# Patient Record
Sex: MALE | Race: WHITE
[De-identification: names, ages, dates, MRNs, and addresses within clinical notes are randomized per-mention and may not be internally consistent; named-entity substitution may affect disease eponyms.]

---

## 2017-03-17 ENCOUNTER — HOSPITAL ENCOUNTER (EMERGENCY)
Dept: HOSPITAL 56 - MW.ED | Age: 57
Discharge: HOME | End: 2017-03-17
Payer: MEDICARE

## 2017-03-17 DIAGNOSIS — J18.1: Primary | ICD-10-CM

## 2017-03-17 DIAGNOSIS — Z88.6: ICD-10-CM

## 2017-03-17 DIAGNOSIS — Z88.0: ICD-10-CM

## 2017-03-17 DIAGNOSIS — Z79.899: ICD-10-CM

## 2017-03-17 DIAGNOSIS — I10: ICD-10-CM

## 2017-03-17 DIAGNOSIS — E11.9: ICD-10-CM

## 2017-03-17 LAB
CHLORIDE SERPL-SCNC: 102 MMOL/L (ref 98–110)
SODIUM SERPL-SCNC: 138 MMOL/L (ref 136–146)

## 2017-03-17 PROCEDURE — 83880 ASSAY OF NATRIURETIC PEPTIDE: CPT

## 2017-03-17 PROCEDURE — 80053 COMPREHEN METABOLIC PANEL: CPT

## 2017-03-17 PROCEDURE — 83605 ASSAY OF LACTIC ACID: CPT

## 2017-03-17 PROCEDURE — 93005 ELECTROCARDIOGRAM TRACING: CPT

## 2017-03-17 PROCEDURE — 96365 THER/PROPH/DIAG IV INF INIT: CPT

## 2017-03-17 PROCEDURE — 71020: CPT

## 2017-03-17 PROCEDURE — 36415 COLL VENOUS BLD VENIPUNCTURE: CPT

## 2017-03-17 PROCEDURE — 87040 BLOOD CULTURE FOR BACTERIA: CPT

## 2017-03-17 PROCEDURE — 94664 DEMO&/EVAL PT USE INHALER: CPT

## 2017-03-17 PROCEDURE — 99284 EMERGENCY DEPT VISIT MOD MDM: CPT

## 2017-03-17 PROCEDURE — 85025 COMPLETE CBC W/AUTO DIFF WBC: CPT

## 2017-03-17 PROCEDURE — 96361 HYDRATE IV INFUSION ADD-ON: CPT

## 2017-03-17 NOTE — EDM.PDOC
ED HISTORY OF PRESENT ILLNESS





- General


Chief Complaint: Respiratory Problem


Stated Complaint: COUGH


Time Seen by Provider: 03/17/17 19:24





- History of Present Illness


INITIAL COMMENTS - FREE TEXT/NARRATIVE: 





HISTORY AND PHYSICAL:





History of present illness:


The patient is a 55 y/o male  with a history of diabetes using both oral and 

insulin and presents with a five-day history of dry hacking cough nonproductive 

of any phlegm. Patient follows at [facility] but did not see his provider 

for these issues but he did get his influenza shot this year. The patient 

denies any cardiac or pulmonary history and has been eating and drinking 

normally. According to the patient and wife at bedside he coughs more at night 

and sometimes will cough so hard that he can throw up. He does not throw up 

when he is not coughing and has no abdominal complaints. The patient denies any 

chest pain and only feels short of breath when he is coughing. Patient denies 

any leg pain or swelling and has no CHF history. Patient states compliance with 

his medications. He has not used any over-the-counter medications. He is noted 

to have a fever here in triage but denies that he feels hot. His palpitations.





Review of systems: 


As per history of present illness and below otherwise all systems reviewed and 

negative.





Past medical history: 


As per history of present illness and as reviewed below otherwise 

noncontributory.





Surgical history: 


As per history of present illness and as reviewed below otherwise 

noncontributory.





Social history: 


No reported history of drug or alcohol abuse.





Family history: 


As per history of present illness and as reviewed below otherwise 

noncontributory.





Physical exam:


General: Well-developed well-nourished man speaking clearly and easily without 

breathlessness and vital signs of the note by me.


HEENT: Atraumatic, normocephalic, pupils reactive, negative for conjunctival 

pallor or scleral icterus, mucous membranes moist, throat clear, neck supple, 

nontender, trachea midline. No cervical adenopathy or nuchal rigidity


Lungs: Clear to auscultation, breath sounds equal bilaterally, chest nontender. 

No work or breathing or sensory muscle use no wheezing stridor


Heart: S1S2, regular rhythm and slightly tachycardic rate of my evaluation, 

negative for clicks, rubs, or JVD.


Abdomen: Soft, nondistended, nontender. Negative for masses or 

hepatosplenomegaly. Negative for costovertebral tenderness.


Pelvis: Stable nontender.


Genitourinary: Deferred.


Rectal: Deferred.


Extremities: Atraumatic, negative for cords or calf pain. Neurovascular 

unremarkable. No pedal edema or leg asymmetry


Neuro: Awake, alert, oriented. Cranial nerves II through XII unremarkable. 

Cerebellum unremarkable. Motor and sensory unremarkable throughout. Exam 

nonfocal.





Diagnostics:


EKG chest x-ray CBC CMP lactic acid influenza swab BNP blood culture x2





Therapeutics:


Gentle IV fluids duo neb Rocephin





I discussed with the patient and wife at bedside the testing results and care 

plan to drop to blood cultures and give Rocephin in the ER and antibiotics for 

home. I will also give him a spacer and albuterol as he feels that that did 

help open up his airways and help with the cough. I will advise hydration and 

close followup with family DrDaniel at [facility], . I have advised on 

reasons to return 





Impression: 


Persistent cough, early right lower lobe pneumonia





Definitive disposition and diagnosis as appropriate pending reevaluation and 

review of above.





- Related Data


Allergies/ADRs: 


 Allergies











Allergy/AdvReac Type Severity Reaction Status Date / Time


 


acetaminophen [From Tylenol] Allergy  Facial Verified 03/17/17 19:17





   Swelling  


 


Penicillins Allergy  Facial Verified 03/17/17 19:17





   Swelling  











Home Meds: 


 Home Meds





Hydrochlorothiazide 25 mg PO DAILY 03/17/17 [History]


Insulin Aspart [Novolog] 60 unit SQ BID 03/17/17 [History]


metFORMIN [Glucophage] 500 mg PO QID 03/17/17 [History]











Past Medical History


HEENT History: Reports: None


Cardiovascular History: Reports: Hypertension


Respiratory History: Reports: None


Gastrointestinal History: Reports: None


Genitourinary History: Reports: None


Psychiatric History: Reports: None


Endocrine/Metabolic History: Reports: Diabetes, type II


Oncologic (Cancer) History: Reports: None





- Infectious Disease History


Infectious Disease History: Reports: Chicken pox





Social & Family History





- Family History


Family Medical History: Noncontributory





- Tobacco Use


Smoking Status *Q: Never Smoker





- Recreational Drug Use


Recreational Drug Use: No





ED ROS GENERAL





- Review of Systems


Review Of Systems: ROS reveals no pertinent complaints other than HPI.





ED EXAM, GENERAL





- Physical Exam


Exam: See Below (See dictation)





Course





- Vital Signs


Last Recorded V/S: 


 Last Vital Signs











Temp  38.2 C H  03/17/17 19:20


 


Pulse  114 H  03/17/17 19:20


 


Resp  18   03/17/17 19:20


 


BP  152/91 H  03/17/17 19:20


 


Pulse Ox  95   03/17/17 19:20














- Orders/Labs/Meds


Orders: 


 Active Orders 24 hr











 Category Date Time Status


 


 EKG Documentation Completion [RC] STAT Care  03/17/17 19:28 Active


 


 RT Aerosol Therapy [RC] ASDIRECTED Care  03/17/17 19:33 Active


 


 Chest 2V [CR] Stat Exams  03/17/17 19:29 Taken


 


 CULTURE BLOOD [BC] Stat Lab  03/17/17 20:45 Ordered


 


 CULTURE BLOOD [BC] Stat Lab  03/17/17 20:45 Ordered


 


 Sodium Chloride 0.9% [Normal Saline] 500 ml Med  03/17/17 19:30 Active





 IV STAT   


 


 Sodium Chloride 0.9% [Saline Flush] Med  03/17/17 19:30 Active





 10 ml FLUSH ASDIRECTED PRN   


 


 Sodium Chloride 0.9% [Saline Flush] Med  03/17/17 19:30 Active





 2.5 ml FLUSH ASDIRECTED PRN   


 


 cefTRIAXone [Rocephin in Dextrose,Iso-Osm 1 GM/50 ML] 1 Med  03/17/17 20:45 

Ordered





 gm   





 Premix Bag 1 bag   





 IV ONETIME   


 


 Blood Culture x2 Reflex Set [OM.PC] Stat Oth  03/17/17 20:45 Ordered


 


 Saline Lock Insert [OM.PC] Stat Oth  03/17/17 19:29 Ordered








 Medication Orders





Sodium Chloride (Normal Saline)  500 mls @ 999 mls/hr IV STAT [facility]


   Last Admin: 03/17/17 20:01  Dose: 999 mls/hr


Ceftriaxone Sodium/Dextrose 1 (gm/ Premix)  50 mls @ 100 mls/hr IV ONETIME ONE


   Stop: 03/17/17 21:14


Sodium Chloride (Saline Flush)  10 ml FLUSH ASDIRECTED PRN


   PRN Reason: Keep Vein Open


Sodium Chloride (Saline Flush)  2.5 ml FLUSH ASDIRECTED PRN


   PRN Reason: Keep Vein Open








Labs: 


 Laboratory Tests











  03/17/17 03/17/17 03/17/17 Range/Units





  19:45 19:45 19:45 


 


WBC  5.60    (4.0-11.0)  K/uL


 


RBC  4.58    (4.50-5.90)  M/uL


 


Hgb  13.6    (13.0-17.0)  g/dL


 


Hct  42.1    (38.0-50.0)  %


 


MCV  91.9    (80.0-98.0)  fL


 


MCH  29.7    (27.0-32.0)  pg


 


MCHC  32.3    (31.0-37.0)  g/dL


 


RDW Std Deviation  45.9    (28.0-62.0)  fl


 


RDW Coeff of Arvind  14    (11.0-15.0)  %


 


Plt Count  148 L    (150-400)  K/uL


 


MPV  11.40    (7.40-12.00)  fL


 


Add Manual Diff  YES    


 


Neutrophils % (Manual)  55    (48.0-80.0)  %


 


Band Neutrophils %  3    %


 


Lymphocytes % (Manual)  22    (16.0-40.0)  %


 


Monocytes % (Manual)  16 H    (0.0-15.0)  %


 


Eosinophils % (Manual)  4    (0.0-7.0)  %


 


Nucleated RBC %  0.0    /100WBC


 


Absolute Seg Neuts  3.1    


 


Band Neutrophils #  0.2    


 


Lymphocytes # (Manual)  1.2    


 


Monocytes # (Manual)  0.9    


 


Eosinophils # (Manual)  0.2    


 


Nucleated RBCs #  0    K/uL


 


Lactate   2.1 H   (0.20-2.00)  mmol/L


 


Sodium    138  (136-146)  mmol/L


 


Potassium    4.7  (3.5-5.1)  mmol/L


 


Chloride    102  ()  mmol/L


 


Carbon Dioxide    25  (21-31)  mmol/L


 


BUN    12  (6.0-23.0)  mg/dL


 


Creatinine    0.9  (0.6-1.5)  mg/dL


 


Est Cr Clr Drug Dosing    100.59  mL/min


 


Estimated GFR (MDRD)    > 60.0  ml/min


 


Glucose    157 H  ()  mg/dL


 


Calcium    9.3  (8.8-10.8)  mg/dL


 


Total Bilirubin    0.7  (0.1-1.5)  mg/dL


 


AST    32  (5-40)  IU/L


 


ALT    36  (8-54)  IU/L


 


Alkaline Phosphatase    56  ()  


 


B-Natriuretic Peptide     (<100)  PG/ML


 


Total Protein    8.3 H  (6.0-8.0)  g/dL


 


Albumin    4.0  (3.5-5.0)  g/dL


 


Globulin    4.3 H  (2.0-3.5)  g/dL


 


Albumin/Globulin Ratio    0.9 L  (1.3-2.8)  














  03/17/17 Range/Units





  19:45 


 


WBC   (4.0-11.0)  K/uL


 


RBC   (4.50-5.90)  M/uL


 


Hgb   (13.0-17.0)  g/dL


 


Hct   (38.0-50.0)  %


 


MCV   (80.0-98.0)  fL


 


MCH   (27.0-32.0)  pg


 


MCHC   (31.0-37.0)  g/dL


 


RDW Std Deviation   (28.0-62.0)  fl


 


RDW Coeff of Arvind   (11.0-15.0)  %


 


Plt Count   (150-400)  K/uL


 


MPV   (7.40-12.00)  fL


 


Add Manual Diff   


 


Neutrophils % (Manual)   (48.0-80.0)  %


 


Band Neutrophils %   %


 


Lymphocytes % (Manual)   (16.0-40.0)  %


 


Monocytes % (Manual)   (0.0-15.0)  %


 


Eosinophils % (Manual)   (0.0-7.0)  %


 


Nucleated RBC %   /100WBC


 


Absolute Seg Neuts   


 


Band Neutrophils #   


 


Lymphocytes # (Manual)   


 


Monocytes # (Manual)   


 


Eosinophils # (Manual)   


 


Nucleated RBCs #   K/uL


 


Lactate   (0.20-2.00)  mmol/L


 


Sodium   (136-146)  mmol/L


 


Potassium   (3.5-5.1)  mmol/L


 


Chloride   ()  mmol/L


 


Carbon Dioxide   (21-31)  mmol/L


 


BUN   (6.0-23.0)  mg/dL


 


Creatinine   (0.6-1.5)  mg/dL


 


Est Cr Clr Drug Dosing   mL/min


 


Estimated GFR (MDRD)   ml/min


 


Glucose   ()  mg/dL


 


Calcium   (8.8-10.8)  mg/dL


 


Total Bilirubin   (0.1-1.5)  mg/dL


 


AST   (5-40)  IU/L


 


ALT   (8-54)  IU/L


 


Alkaline Phosphatase   ()  


 


B-Natriuretic Peptide  < 15  (<100)  PG/ML


 


Total Protein   (6.0-8.0)  g/dL


 


Albumin   (3.5-5.0)  g/dL


 


Globulin   (2.0-3.5)  g/dL


 


Albumin/Globulin Ratio   (1.3-2.8)  











Meds: 


Medications











Generic Name Dose Route Start Last Admin





  Trade Name Freq  PRN Reason Stop Dose Admin


 


Sodium Chloride  500 mls @ 999 mls/hr  03/17/17 19:30  03/17/17 20:01





  Normal Saline  IV   999 mls/hr





  STAT ZHAO   Administration


 


Ceftriaxone Sodium/Dextrose 1  50 mls @ 100 mls/hr  03/17/17 20:45  





  gm/ Premix  IV  03/17/17 21:14  





  ONETIME ONE   


 


Sodium Chloride  10 ml  03/17/17 19:30  





  Saline Flush  FLUSH   





  ASDIRECTED PRN   





  Keep Vein Open   


 


Sodium Chloride  2.5 ml  03/17/17 19:30  





  Saline Flush  FLUSH   





  ASDIRECTED PRN   





  Keep Vein Open   














Discontinued Medications














Generic Name Dose Route Start Last Admin





  Trade Name Freq  PRN Reason Stop Dose Admin


 


Albuterol/Ipratropium  3 ml  03/17/17 19:33  03/17/17 19:41





  Duoneb 3.0-0.5 Mg/3 Ml  NEB  03/17/17 19:34  3 ml





  ONETIME ONE   Administration


 


Ibuprofen  800 mg  03/17/17 19:30  03/17/17 20:00





  Motrin  PO  03/17/17 19:31  800 mg





  ONETIME ONE   Administration














Departure





- Departure


Time of Disposition: 20:48


Disposition: Home, Self-Care 01


Condition: good


Clinical Impression: 


Pneumonia


Qualifiers:


 Pneumonia type: due to unspecified organism Laterality: right Lung location: 

lower lobe of lung Qualified Code(s): J18.1 - Lobar pneumonia, unspecified 

organism





Forms:  ED Department Discharge


Additional Instructions: 


The following information is given to patients seen in the emergency department 

who are being discharged to home. This information is to outline your options 

for follow-up care. We provide all patients seen in our emergency department 

with a follow-up referral.





The need for follow-up, as well as the timing and circumstances, are variable 

depending upon the specifics of your emergency department visit.





If you don't have a primary care physician on staff, we will provide you with a 

referral. We always advise you to contact your personal physician following an 

emergency department visit to inform them of the circumstance of the visit and 

for follow-up with them and/or the need for any referrals to a consulting 

specialist.





The emergency department will also refer you to a specialist when appropriate. 

This referral assures that you have the opportunity for followup care with a 

specialist. All of these measure are taken in an effort to provide you with 

optimal care, which includes your followup.





Under all circumstances we always encourage you to contact your private 

physician who remains a resource for coordinating  your care. When calling for 

followup care, please make the office aware that this follow-up is from your 

recent emergency room visit. If for any reason you are refused follow-up, 

please contact the [facility] emergency 

department at [phone] and ask to speak to the emergency department 

charge nurse.





[facility] 


Primary care- Internal Medicine and Family [facility]


[address] [place] [id number]


[phone]





[address]


[place] [address] [phone]








Please call and schedule a followup appointment early next week with  

or one of our providers at the clinic. Push hydration rest and use over-the-

counter Tylenol or ibuprofen for fevers. Take antibiotics until they are 

finished. Use the inhaler you have been prescribed today with a spacer as 

needed every 6 hours for cough or trouble with your breathing. Return to ER as 

needed and as discussed 





- My Orders


Last 24 Hours: 


My Active Orders





03/17/17 19:28


EKG Documentation Completion [RC] STAT 





03/17/17 19:29


Chest 2V [CR] Stat 


Saline Lock Insert [OM.PC] Stat 





03/17/17 19:30


Sodium Chloride 0.9% [Normal Saline] 500 ml IV STAT 


Sodium Chloride 0.9% [Saline Flush]   10 ml FLUSH ASDIRECTED PRN 


Sodium Chloride 0.9% [Saline Flush]   2.5 ml FLUSH ASDIRECTED PRN 





03/17/17 19:33


RT Aerosol Therapy [RC] ASDIRECTED 





03/17/17 20:45


CULTURE BLOOD [BC] Stat 


CULTURE BLOOD [BC] Stat 


cefTRIAXone [Rocephin in Dextrose,Iso-Osm 1 GM/50 ML] 1 gm   Premix Bag 1 bag 

IV ONETIME 


Blood Culture x2 Reflex Set [OM.PC] Stat 














- Assessment/Plan


Last 24 Hours: 


My Active Orders





03/17/17 19:28


EKG Documentation Completion [RC] STAT 





03/17/17 19:29


Chest 2V [CR] Stat 


Saline Lock Insert [OM.PC] Stat 





03/17/17 19:30


Sodium Chloride 0.9% [Normal Saline] 500 ml IV STAT 


Sodium Chloride 0.9% [Saline Flush]   10 ml FLUSH ASDIRECTED PRN 


Sodium Chloride 0.9% [Saline Flush]   2.5 ml FLUSH ASDIRECTED PRN 





03/17/17 19:33


RT Aerosol Therapy [RC] ASDIRECTED 





03/17/17 20:45


CULTURE BLOOD [BC] Stat 


CULTURE BLOOD [BC] Stat 


cefTRIAXone [Rocephin in Dextrose,Iso-Osm 1 GM/50 ML] 1 gm   Premix Bag 1 bag 

IV ONETIME 


Blood Culture x2 Reflex Set [OM.PC] Stat

## 2017-03-18 VITALS — DIASTOLIC BLOOD PRESSURE: 81 MMHG | SYSTOLIC BLOOD PRESSURE: 140 MMHG

## 2017-03-20 NOTE — CR
EXAM DATE: 17



PATIENT'S AGE: 56



Patient: DARRELL CASH



Facility: [place], [place]

Patient ID: [id number]

Site Patient ID: [id number].

Site Accession #: [id number]

: 1960

Study: XRay Chest [id number]/17/2017 8:20:38 PM

Ordering Physician: Dorothea Urena



Final Report: 

INDICATION:

Cough for 5 days. 



COMPARISON:

2014.



FINDINGS/IMPRESSION:

Question of subtle new minimal infiltrate at the right lung base, possibly 
representing pneumonia. Lungs otherwise appear clear.



Upper normal heart size, stable. Unremarkable pulmonary vasculature. No pleural 
effusions.



Spinal degenerative changes, healed left rib fractures, and healed left 
clavicle fracture.



Dictated by Giovani Mcdaniel MD @ 3/17/2017 8:37:13 PM



Dictated by: Giovani Mcdaniel MD @ 2017 20:38:39

(Electronic Signature)



Report Signed by Proxy and Original Signed Document filed in the

Medical Record.
[id number]

## 2019-05-19 ENCOUNTER — HOSPITAL ENCOUNTER (INPATIENT)
Dept: HOSPITAL 56 - MW.ED | Age: 59
LOS: 4 days | Discharge: HOME HEALTH | DRG: 641 | End: 2019-05-23
Attending: INTERNAL MEDICINE | Admitting: INTERNAL MEDICINE
Payer: MEDICARE

## 2019-05-19 DIAGNOSIS — F41.9: ICD-10-CM

## 2019-05-19 DIAGNOSIS — E66.9: ICD-10-CM

## 2019-05-19 DIAGNOSIS — Z88.8: ICD-10-CM

## 2019-05-19 DIAGNOSIS — Z79.4: ICD-10-CM

## 2019-05-19 DIAGNOSIS — M62.82: ICD-10-CM

## 2019-05-19 DIAGNOSIS — R78.81: ICD-10-CM

## 2019-05-19 DIAGNOSIS — E11.65: ICD-10-CM

## 2019-05-19 DIAGNOSIS — I10: ICD-10-CM

## 2019-05-19 DIAGNOSIS — D64.9: ICD-10-CM

## 2019-05-19 DIAGNOSIS — E86.0: Primary | ICD-10-CM

## 2019-05-19 LAB
CHLORIDE SERPL-SCNC: 101 MMOL/L (ref 98–107)
SODIUM SERPL-SCNC: 140 MMOL/L (ref 136–148)

## 2019-05-19 PROCEDURE — G0378 HOSPITAL OBSERVATION PER HR: HCPCS

## 2019-05-20 LAB
CHLORIDE SERPL-SCNC: 103 MMOL/L (ref 98–107)
HBA1C BLD-MCNC: 7.7 % (ref 4.5–6.2)
SODIUM SERPL-SCNC: 140 MMOL/L (ref 136–148)

## 2019-05-20 RX ADMIN — METFORMIN HYDROCHLORIDE SCH MG: 500 TABLET, EXTENDED RELEASE ORAL at 10:06

## 2019-05-20 RX ADMIN — METFORMIN HYDROCHLORIDE SCH MG: 500 TABLET, EXTENDED RELEASE ORAL at 20:28

## 2019-05-21 LAB
CHLORIDE SERPL-SCNC: 101 MMOL/L (ref 98–107)
SODIUM SERPL-SCNC: 138 MMOL/L (ref 136–148)

## 2019-05-21 RX ADMIN — METFORMIN HYDROCHLORIDE SCH MG: 500 TABLET, EXTENDED RELEASE ORAL at 20:35

## 2019-05-21 RX ADMIN — METFORMIN HYDROCHLORIDE SCH MG: 500 TABLET, EXTENDED RELEASE ORAL at 09:50

## 2019-05-22 RX ADMIN — METFORMIN HYDROCHLORIDE SCH MG: 500 TABLET, EXTENDED RELEASE ORAL at 20:52

## 2019-05-22 RX ADMIN — METFORMIN HYDROCHLORIDE SCH MG: 500 TABLET, EXTENDED RELEASE ORAL at 08:56

## 2019-05-23 LAB
CHLORIDE SERPL-SCNC: 104 MMOL/L (ref 98–107)
SODIUM SERPL-SCNC: 139 MMOL/L (ref 136–148)

## 2019-05-23 RX ADMIN — METFORMIN HYDROCHLORIDE SCH MG: 500 TABLET, EXTENDED RELEASE ORAL at 08:17

## 2019-06-15 ENCOUNTER — HOSPITAL ENCOUNTER (INPATIENT)
Dept: HOSPITAL 56 - MW.ED | Age: 59
LOS: 13 days | Discharge: HOME HEALTH | DRG: 690 | End: 2019-06-28
Attending: INTERNAL MEDICINE | Admitting: INTERNAL MEDICINE
Payer: MEDICARE

## 2019-06-15 DIAGNOSIS — R41.82: ICD-10-CM

## 2019-06-15 DIAGNOSIS — E11.9: ICD-10-CM

## 2019-06-15 DIAGNOSIS — N30.00: Primary | ICD-10-CM

## 2019-06-15 DIAGNOSIS — I10: ICD-10-CM

## 2019-06-15 DIAGNOSIS — S60.512A: ICD-10-CM

## 2019-06-15 DIAGNOSIS — R41.89: ICD-10-CM

## 2019-06-15 DIAGNOSIS — Z88.8: ICD-10-CM

## 2019-06-15 DIAGNOSIS — F70: ICD-10-CM

## 2019-06-15 DIAGNOSIS — F88: ICD-10-CM
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
## 2019-06-15 DIAGNOSIS — Z88.0: ICD-10-CM

## 2019-06-15 DIAGNOSIS — Z79.899: ICD-10-CM

## 2019-06-15 DIAGNOSIS — Z79.4: ICD-10-CM

## 2019-06-15 DIAGNOSIS — S00.81XA: ICD-10-CM

## 2019-06-15 DIAGNOSIS — R46.0: ICD-10-CM

## 2019-06-15 DIAGNOSIS — Z91.19: ICD-10-CM

## 2019-06-15 LAB
CHLORIDE SERPL-SCNC: 100 MMOL/L (ref 98–107)
SODIUM SERPL-SCNC: 135 MMOL/L (ref 136–148)

## 2019-06-15 PROCEDURE — A4217 STERILE WATER/SALINE, 500 ML: HCPCS

## 2019-06-15 RX ADMIN — DEXTROSE SCH UNITS: 10 SOLUTION INTRAVENOUS at 22:04

## 2019-06-15 RX ADMIN — DEXTROSE SCH UNITS: 10 SOLUTION INTRAVENOUS at 15:10

## 2019-06-15 NOTE — PCM.HP
H&P History of Present Illness





- General


Date of Service: 06/15/19


Admit Problem/Dx: 


 Admission Diagnosis/Problem





Admission Diagnosis/Problem      Altered mental status








Source of Information: Patient, Family, Old Records


History Limitations: Reports: Altered Mental Status





- History of Present Illness


Initial Comments - Free Text/Narative: 





The patient is a 58-year-old gentleman who had been presented to the emergency 

department by his family out of concern for his altered mental status, bizarre 

behavior and not taking any of his medications. The patient was previously 

admitted in May 2019. The patient does have a history of trying to escape at 

times from his previous hospitalization. The patient's family says that they 

cannot take care of him anymore at home. Further, the patient's family says 

that he has been warding urine in jars under his bed as well as not being able 

to take care of his medications to adequately take care of himself. The patient 

himself is alert and mostly oriented although he is somewhat vague in his 

answers and does not know how he got here. A review of records and indicate the 

patient had been driving erratically and had been picked up by the police 

department. The patient has admitted to his family that he would like to go to 

[facility].


Onset of Symptoms: Reports: Unknown/Unsure


Duration of Symptoms: Reports: Week(s):


Location: Reports: Generalized


Severity: Mild


Improves with: Reports: None


Worsens with: Reports: None


Associated Symptoms: Reports: No Other Symptoms





- Related Data


Allergies/Adverse Reactions: 


 Allergies











Allergy/AdvReac Type Severity Reaction Status Date / Time


 


acetaminophen [From Tylenol] Allergy  Facial Verified 06/15/19 12:03





   Swelling  


 


Penicillins Allergy  Facial Verified 06/15/19 12:03





   Swelling  











Home Medications: 


 Home Meds





Hydrochlorothiazide 25 mg PO DAILY 03/17/17 [History]


Insulin Aspart [Novolog] 60 unit SQ BID 03/17/17 [History]


metFORMIN [Glucophage] 500 mg PO QID 03/17/17 [History]











Past Medical History


HEENT History: Reports: None


Cardiovascular History: Reports: Hypertension


Respiratory History: Reports: None


Gastrointestinal History: Reports: None


Genitourinary History: Reports: None


Psychiatric History: Reports: None


Endocrine/Metabolic History: Reports: Diabetes, Type II


Oncologic (Cancer) History: Reports: None





- Infectious Disease History


Infectious Disease History: Reports: Chicken Pox





Social & Family History





- Family History


Family Medical History: Noncontributory





- Tobacco Use


Smoking Status *Q: Unknown Ever Smoked





- Living Situation & Occupation


Living situation: Reports: Single, with Family


Occupation: Disabled





H&P Review of Systems





- Review of Systems:


Review Of Systems: Unable To Obtain





Exam





- Exam


Exam: See Below





- Vital Signs


Vital Signs: 


 Last Vital Signs











Temp  36.4 C   06/15/19 12:04


 


Pulse  117 H  06/15/19 12:04


 


Resp  18   06/15/19 12:04


 


BP  188/117 H  06/15/19 12:04


 


Pulse Ox  98   06/15/19 12:04











Weight: 108.862 kg





- Exam


Quality Assessment: No: Supplemental Oxygen


General: Alert, Cooperative.  No: Oriented


HEENT: Conjunctiva Clear, EACs Clear, EOMI, Mucosa Moist & [place], PERRLA


Neck: Supple, Trachea Midline


Lungs: Clear to Auscultation, Normal Respiratory Effort


Cardiovascular: Regular Rate, Regular Rhythm


GI/Abdominal Exam: Normal Bowel Sounds, Soft, No Distention


Back Exam: Normal Inspection, Full Range of Motion


Extremities: Normal Inspection, No Pedal Edema


Skin: Warm, Dry, Intact


Neurological: Cranial Nerves Intact, Normal Gait


Neuro Extensive - Mental Status: Alert, Disorientation to Place, Slow Response 

to Commands.  No: Normal Cognition


Neuro Extensive - Motor, Sensory, Reflexes: Normal Gait


Psychiatric: Alert, Depressed, Agitated





- Patient Data


Lab Results Last 24 hrs: 


 Laboratory Results - last 24 hr











  06/15/19 06/15/19 06/15/19 Range/Units





  12:05 12:05 13:20 


 


WBC  10.01    (4.0-11.0)  K/uL


 


RBC  4.45 L    (4.50-5.90)  M/uL


 


Hgb  13.6    (13.0-17.0)  g/dL


 


Hct  40.7    (38.0-50.0)  %


 


MCV  91.5    (80.0-98.0)  fL


 


MCH  30.6    (27.0-32.0)  pg


 


MCHC  33.4    (31.0-37.0)  g/dL


 


RDW Std Deviation  45.1    (28.0-62.0)  fl


 


RDW Coeff of Arvind  14    (11.0-15.0)  %


 


Plt Count  194    (150-400)  K/uL


 


MPV  10.50    (7.40-12.00)  fL


 


Neut % (Auto)  77.9    (48.0-80.0)  %


 


Lymph % (Auto)  14.0 L    (16.0-40.0)  %


 


Mono % (Auto)  6.8    (0.0-15.0)  %


 


Eos % (Auto)  1.0    (0.0-7.0)  %


 


Baso % (Auto)  0.3    (0.0-1.5)  %


 


Neut # (Auto)  7.8 H    (1.4-5.7)  K/uL


 


Lymph # (Auto)  1.4    (0.6-2.4)  K/uL


 


Mono # (Auto)  0.7    (0.0-0.8)  K/uL


 


Eos # (Auto)  0.1    (0.0-0.7)  K/uL


 


Baso # (Auto)  0.0    (0.0-0.1)  K/uL


 


Nucleated RBC %  0.0    /100WBC


 


Nucleated RBCs #  0    K/uL


 


Sodium   135 L   (136-148)  mmol/L


 


Potassium   3.4 L   (3.5-5.1)  mmol/L


 


Chloride   100   ()  mmol/L


 


Carbon Dioxide   25.9   (21.0-32.0)  mmol/L


 


BUN   16   (7.0-18.0)  mg/dL


 


Creatinine   0.9   (0.8-1.3)  mg/dL


 


Est Cr Clr Drug Dosing   83.65   mL/min


 


Estimated GFR (MDRD)   > 60.0   ml/min


 


Glucose   239 H   ()  mg/dL


 


Calcium   8.9   (8.5-10.1)  mg/dL


 


Total Bilirubin   0.7   (0.2-1.0)  mg/dL


 


AST   33   (15-37)  IU/L


 


ALT   53   (14-63)  IU/L


 


Alkaline Phosphatase   63   ()  U/L


 


Total Protein   7.7   (6.4-8.2)  g/dL


 


Albumin   3.6   (3.4-5.0)  g/dL


 


Globulin   4.1 H   (2.6-4.0)  g/dL


 


Albumin/Globulin Ratio   0.9   (0.9-1.6)  


 


Urine Color    YELLOW  


 


Urine Appearance    HAZY  


 


Urine pH    6.0  (5.0-8.0)  


 


Ur Specific Gravity    1.015  (1.001-1.035)  


 


Urine Protein    NEGATIVE  (NEGATIVE)  mg/dL


 


Urine Glucose (UA)    NEGATIVE  (NEGATIVE)  mg/dL


 


Urine Ketones    NEGATIVE  (NEGATIVE)  mg/dL


 


Urine Occult Blood    MODERATE H  (NEGATIVE)  


 


Urine Nitrite    POSITIVE H  (NEGATIVE)  


 


Urine Bilirubin    NEGATIVE  (NEGATIVE)  


 


Urine Urobilinogen    0.2  (<2.0)  EU/dL


 


Ur Leukocyte Esterase    SMALL H  (NEGATIVE)  


 


Urine RBC    6-8  (0-2/HPF)  


 


Urine WBC    10-15  (0-5/HPF)  


 


Ur Epithelial Cells    RARE  (NONE-FEW)  


 


Urine Bacteria    2+ H  (NEGATIVE)  











Result Diagrams: 


 06/15/19 12:05





 06/15/19 12:05





- Problem List


(1) Altered mental status


SNOMED Code(s): 660686332


   ICD Code: R41.82 - ALTERED MENTAL STATUS, UNSPECIFIED   Status: Acute   

Priority: High   Current Visit: Yes   


Qualifiers: 


   Altered mental status type: unspecified   Qualified Code(s): R41.82 - 

Altered mental status, unspecified   





(2) Delayed emotional development


SNOMED Code(s): 395933641


   ICD Code: F88 - OTHER DISORDERS OF PSYCHOLOGICAL DEVELOPMENT   Status: 

Chronic   Priority: High   Current Visit: Yes   





(3) Diabetes mellitus type 2 in obese


SNOMED Code(s): 60312691


   ICD Code: E11.69 - TYPE 2 DIABETES MELLITUS WITH OTHER SPECIFIED COMPLICATION

; E66.9 - OBESITY, UNSPECIFIED   Status: Chronic   Priority: High   Current 

Visit: Yes   





(4) Noncompliance


SNOMED Code(s): 8639792


   ICD Code: Z91.19 - PATIENT'S NONCOMPLIANCE W OTH MEDICAL TREATMENT AND 

REGIMEN   Status: Chronic   Priority: Medium   Current Visit: Yes   





(5) Total self-care deficit


SNOMED Code(s): 63494780


   ICD Code: R41.89 - OTH SYMPTOMS AND SIGNS W COGNITIVE FUNCTIONS AND 

AWARENESS   Status: Chronic   Priority: High   Current Visit: Yes   


Problem List Initiated/Reviewed/Updated: Yes


Orders Last 24hrs: 


 Active Orders 24 hr











 Category Date Time Status


 


 Patient Status [ADT] Stat ADT  06/15/19 13:32 Active


 


 Diabetes Education [RC] Click to Edit Care  06/15/19 13:57 Active


 


 EKG Documentation Completion [RC] STAT Care  06/15/19 11:58 Active


 


 Oxygen Therapy [RC] PRN Care  06/15/19 13:56 Active


 


 Up With Assistance [RC] ASDIRECTED Care  06/15/19 13:56 Active


 


 VTE/DVT Education [RC] PER UNIT ROUTINE Care  06/15/19 13:56 Active


 


 Vital Signs [RC] Q4H Care  06/15/19 13:56 Active


 


 American Diabetic Association Diet [DIET] Diet  06/15/19 Dinner Active


 


 Cervical Spine wo Cont [CT] Stat Exams  06/15/19 11:59 Taken


 


 Head wo Cont [CT] Stat Exams  06/15/19 11:58 Taken


 


 CULTURE URINE [RM] Routine Lab  06/15/19 13:25 Received


 


 Docusate Sodium [Colace] Med  06/15/19 13:56 Active





 100 mg PO BID PRN   


 


 Heparin Sodium Med  06/15/19 14:00 Active





 5,000 units SUBCUT Q8H   


 


 Ibuprofen [Motrin] Med  06/15/19 13:56 Active





 400 mg PO Q6H PRN   


 


 Insulin Aspart [NovoLOG] Med  06/15/19 21:00 Active





 See Protocol  SUBCUT ACBREAKFASTANDBED   


 


 Levofloxacin/Dextrose 5%-Water [Levaquin in D5W 500 MG/ Med  06/15/19 13:33 

Active





 100 ML] 500 mg   





 Premix Bag 1 bag   





 IV ONETIME   


 


 Ondansetron [Zofran ODT] Med  06/15/19 13:56 Active





 4 mg PO Q6H PRN   


 


 Sodium Chloride 0.9% [Normal Saline] 1,000 ml Med  06/15/19 14:00 Active





 IV ASDIRECTED   


 


 Sodium Chloride 0.9% [Saline Flush] Med  06/15/19 11:59 Active





 10 ml FLUSH ASDIRECTED PRN   


 


 Sodium Chloride 0.9% [Saline Flush] Med  06/15/19 11:59 Active





 2.5 ml FLUSH ASDIRECTED PRN   


 


 Temazepam [Restoril] Med  06/15/19 13:56 Active





 15 mg PO BEDTIME PRN   


 


 oxyCODONE Med  06/15/19 13:56 Active





 5 mg PO Q4H PRN   


 


 Glucose Management Sub Q Reflex [OM.PC] Click To Edit Oth  06/15/19 13:56 

Ordered


 


 Saline Lock Insert [OM.PC] Stat Oth  06/15/19 11:58 Ordered


 


 Resuscitation Status Routine Resus Stat  06/15/19 13:56 Ordered








 Medication Orders





Docusate Sodium (Colace)  100 mg PO BID PRN


   PRN Reason: Constipation


Heparin Sodium (Porcine) (Heparin Sodium)  5,000 units SUBCUT Q8H ZHAO


Levofloxacin/Dextrose 500 mg/ (Premix)  100 mls @ 100 mls/hr IV ONETIME ONE


   Stop: 06/15/19 14:32


   Last Admin: 06/15/19 13:47  Dose: 100 mls/hr


Sodium Chloride (Normal Saline)  1,000 mls @ 75 mls/hr IV ASDIRECTED ZHAO


Ibuprofen (Motrin)  400 mg PO Q6H PRN


   PRN Reason: Pain (mild 1-3)


Insulin Aspart (Novolog)  0 unit SUBCUT ACBREAKFASTANDBED ZHAO; Protocol


Ondansetron HCl (Zofran Odt)  4 mg PO Q6H PRN


   PRN Reason: nausea, able to take PO


Oxycodone HCl (Oxycodone)  5 mg PO Q4H PRN


   PRN Reason: Pain (moderate 4-6)


Sodium Chloride (Saline Flush)  10 ml FLUSH ASDIRECTED PRN


   PRN Reason: Keep Vein Open


   Last Admin: 06/15/19 12:12  Dose: 10 ml


Sodium Chloride (Saline Flush)  2.5 ml FLUSH ASDIRECTED PRN


   PRN Reason: Keep Vein Open


   Last Admin: 06/15/19 12:12  Dose: 2.5 ml


Temazepam (Restoril)  15 mg PO BEDTIME PRN


   PRN Reason: Sleep








Assessment/Plan Comment:: 





the patient is a 58-year-old gentleman who has some developmental delay and has 

been lately, over the past 6 months, exhibiting rather bizarre symptoms. The 

patient has not been able to take care of himself according to the family and 

has been noncompliant with his medications especially with regards to his 

diabetes. The patient will be admitted to inpatient due to the intensity of 

service and the fact that the patient may require one-to-one setter secondary 

to his history of previously trying to escape from hospitalization. The patient 

apparently hasn't been mean or angry he has just been wanting to escape. The 

patient will be kept on fluids. Is no evidence of infection although this be 

monitored by repeat laboratory studies. I've ordered Haldol 5 mg by mouth as 

necessary for his agitation. The patient will also be kept on appropriate ADA 

diet. I've also ordered insulin sliding scale area the patient has been 

noncompliant with his antihypertensive medications and he'll be monitored with 

regards to his vital signs and his medication will be adjusted as necessary. 

The patient should be appropriate for discharge to [facility] after 

formal evaluation.

## 2019-06-15 NOTE — CT
HISTORY:



Pain.



TECHNIQUE:



Noncontrast CT of the cervical spine.



COMPARISON:



No prior.



FINDINGS:



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 There 

is no acute cervical fracture. No cervical malalignment. Degenerative disc 

and joint disease present within the cervical spine.



-



At C2-C3, no canal or foraminal stenosis.



At C3-C4, no canal or foraminal stenosis.



At C4-C5, no canal or foraminal stenosis.



At C5-C6, no canal or foraminal stenosis.



At C6-C7, no canal or foraminal stenosis.



At C7-T1, no canal or foraminal stenosis.



IMPRESSION:



1. No acute cervical fracture or cervical malalignment.



2. Degenerative changes.



3. 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



Dictated by Kayode Myles MD @ 6/15/2019 1:14:12 PM



Please note that all CT scans at this facility use dose modulation, 

iterative reconstruction, and/or weight-based dosing when appropriate to 

reduce radiation dose to as low as reasonably achievable.



Dictated by: Kayode Myles MD @ 06/15/2019 13:14:15



(Electronically Signed)

## 2019-06-15 NOTE — CT
HISTORY:



Pain.



TECHNIQUE:



Noncontrast head CT.



COMPARISON:



No prior.



FINDINGS:



There is no acute intracranial hemorrhage or acute ischemic infarct. No 

mass effect or midline shift. No hydrocephalus. No extra-axial collection 

or hematoma. No acute loss of gray-white differentiation. The mastoid air 

cells are clear. Paranasal sinuses are clear. No acute skull fracture.



IMPRESSION:



No acute intracranial injury or disease.



Dictated by Kayode Myles MD @ 6/15/2019 1:10:24 PM



Please note that all CT scans at this facility use dose modulation, 

iterative reconstruction, and/or weight-based dosing when appropriate to 

reduce radiation dose to as low as reasonably achievable.



Dictated by: Kayode Myles MD @ 06/15/2019 13:10:30



(Electronically Signed)

## 2019-06-15 NOTE — EDM.PDOC
ED HPI GENERAL MEDICAL PROBLEM





- General


Chief Complaint: Neurological Problem


Stated Complaint: AMB


Time Seen by Provider: 06/15/19 11:58


Source of Information: Reports: EMS


History Limitations: Reports: No Limitations





- History of Present Illness


INITIAL COMMENTS - FREE TEXT/NARRATIVE: 


History of present illness:


[]Patient was driving erratically and police was notified and followed him 

home. He is confused unable to give any history. Patient has been admitted here 

for similar confusion in the past. Patient arrived sitting upright on the 

[place] disheveled, soiled his pants and abrasion on his face and left hand. He 

is able to say his name and denies any pain. Patient's family requested a 

dementia workup. She had a similar episode in May this year and was admitted to 

this hospital and eloped undressed but was stopped in the parking lot by EMS as 

they were coming in.





Review of systems: 


As per history of present illness and below otherwise all systems reviewed and 

negative.





Past medical history: 


As per history of present illness and as reviewed below otherwise 

noncontributory.





Surgical history: 


As per history of present illness and as reviewed below otherwise 

noncontributory.





Social history: 


No reported history of drug or alcohol abuse.





Family history: 


As per history of present illness and as reviewed below otherwise 

noncontributory.





Physical exam:


General: Well developed, well nourished in NAD


HEENT: Abrasion right cheek,, normocephalic, pupils reactive, negative for 

conjunctival pallor or scleral icterus, mucous membranes moist, throat clear, 

neck supple, nontender, trachea midline.


Lungs: Clear to auscultation, breath sounds equal bilaterally, chest nontender.


Heart: S1S2, regular, negative for clicks, rubs, or JVD.


Abdomen: NABS, Soft, nondistended, nontender. Negative for masses or 

hepatosplenomegaly. Negative for costovertebral tenderness.


Pelvis: Stable nontender.


Genitourinary: Deferred.


Rectal: Deferred.


Extremities:Abrasion left dorsal hand, negative for cords or calf pain. 

Neurovascular unremarkable.


Neuro: Awake, alert, oriented 2 Cranial nerves II through XII unremarkable. 

Cerebellum unremarkable. Motor and sensory unremarkable throughout. Exam 

nonfocal.


Skin:warm and dry





Diagnostics:


CT head, C-spine, CBC, chemistry, UA, EKG





Therapeutics:


IV hydration





ED Course:


Stable





Impression: 


Altered mental status, abrasion on face and left hand





Prescriptions:


None





Plan:


Admit to Dr. Asher





Definitive disposition and diagnosis as appropriate pending reevaluation and 

review of above.








- Related Data


 Allergies











Allergy/AdvReac Type Severity Reaction Status Date / Time


 


acetaminophen [From Tylenol] Allergy  Facial Verified 06/15/19 12:03





   Swelling  


 


Penicillins Allergy  Facial Verified 06/15/19 12:03





   Swelling  











Home Meds: 


 Home Meds





Hydrochlorothiazide 25 mg PO DAILY 03/17/17 [History]


Insulin Aspart [Novolog] 60 unit SQ BID 03/17/17 [History]


metFORMIN [Glucophage] 500 mg PO QID 03/17/17 [History]











Past Medical History


HEENT History: Reports: None


Cardiovascular History: Reports: Hypertension


Respiratory History: Reports: None


Gastrointestinal History: Reports: None


Genitourinary History: Reports: None


Psychiatric History: Reports: None


Endocrine/Metabolic History: Reports: Diabetes, Type II


Oncologic (Cancer) History: Reports: None





- Infectious Disease History


Infectious Disease History: Reports: Chicken Pox





Social & Family History





- Family History


Family Medical History: Noncontributory





ED ROS GENERAL





- Review of Systems


Review Of Systems: ROS reveals no pertinent complaints other than HPI.





ED EXAM, GENERAL





- Physical Exam


Exam: See Below (History of present illness)





Course





- Vital Signs


Last Recorded V/S: 


 Last Vital Signs











Temp  97.6 F   06/15/19 12:04


 


Pulse  117 H  06/15/19 12:04


 


Resp  18   06/15/19 12:04


 


BP  188/117 H  06/15/19 12:04


 


Pulse Ox  98   06/15/19 12:04














- Orders/Labs/Meds


Orders: 


 Active Orders 24 hr











 Category Date Time Status


 


 Patient Status [ADT] Stat ADT  06/15/19 13:32 Active


 


 EKG Documentation Completion [RC] STAT Care  06/15/19 11:58 Active


 


 Cervical Spine wo Cont [CT] Stat Exams  06/15/19 11:59 Taken


 


 Head wo Cont [CT] Stat Exams  06/15/19 11:58 Taken


 


 CULTURE URINE [RM] Routine Lab  06/15/19 13:25 Received


 


 Levofloxacin/Dextrose 5%-Water [Levaquin in D5W 500 MG/ Med  06/15/19 13:33 

Active





 100 ML] 500 mg   





 Premix Bag 1 bag   





 IV ONETIME   


 


 Sodium Chloride 0.9% [Saline Flush] Med  06/15/19 11:59 Active





 10 ml FLUSH ASDIRECTED PRN   


 


 Sodium Chloride 0.9% [Saline Flush] Med  06/15/19 11:59 Active





 2.5 ml FLUSH ASDIRECTED PRN   


 


 Saline Lock Insert [OM.PC] Stat Oth  06/15/19 11:58 Ordered








 Medication Orders





Docusate Sodium (Colace)  100 mg PO BID PRN


   PRN Reason: Constipation


Heparin Sodium (Porcine) (Heparin Sodium)  5,000 units SUBCUT Q8H ZHAO


Levofloxacin/Dextrose 500 mg/ (Premix)  100 mls @ 100 mls/hr IV ONETIME ONE


   Stop: 06/15/19 14:32


   Last Admin: 06/15/19 13:47  Dose: 100 mls/hr


Sodium Chloride (Normal Saline)  1,000 mls @ 75 mls/hr IV ASDIRECTED ZHAO


Ibuprofen (Motrin)  400 mg PO Q6H PRN


   PRN Reason: Pain (mild 1-3)


Insulin Aspart (Novolog)  0 unit SUBCUT ACBREAKFASTANDBED ZHAO; Protocol


Ondansetron HCl (Zofran Odt)  4 mg PO Q6H PRN


   PRN Reason: nausea, able to take PO


Oxycodone HCl (Oxycodone)  5 mg PO Q4H PRN


   PRN Reason: Pain (moderate 4-6)


Sodium Chloride (Saline Flush)  10 ml FLUSH ASDIRECTED PRN


   PRN Reason: Keep Vein Open


   Last Admin: 06/15/19 12:12  Dose: 10 ml


Sodium Chloride (Saline Flush)  2.5 ml FLUSH ASDIRECTED PRN


   PRN Reason: Keep Vein Open


   Last Admin: 06/15/19 12:12  Dose: 2.5 ml


Temazepam (Restoril)  15 mg PO BEDTIME PRN


   PRN Reason: Sleep








Labs: 


 Laboratory Tests











  06/15/19 06/15/19 06/15/19 Range/Units





  12:05 12:05 13:20 


 


WBC  10.01    (4.0-11.0)  K/uL


 


RBC  4.45 L    (4.50-5.90)  M/uL


 


Hgb  13.6    (13.0-17.0)  g/dL


 


Hct  40.7    (38.0-50.0)  %


 


MCV  91.5    (80.0-98.0)  fL


 


MCH  30.6    (27.0-32.0)  pg


 


MCHC  33.4    (31.0-37.0)  g/dL


 


RDW Std Deviation  45.1    (28.0-62.0)  fl


 


RDW Coeff of Arvind  14    (11.0-15.0)  %


 


Plt Count  194    (150-400)  K/uL


 


MPV  10.50    (7.40-12.00)  fL


 


Neut % (Auto)  77.9    (48.0-80.0)  %


 


Lymph % (Auto)  14.0 L    (16.0-40.0)  %


 


Mono % (Auto)  6.8    (0.0-15.0)  %


 


Eos % (Auto)  1.0    (0.0-7.0)  %


 


Baso % (Auto)  0.3    (0.0-1.5)  %


 


Neut # (Auto)  7.8 H    (1.4-5.7)  K/uL


 


Lymph # (Auto)  1.4    (0.6-2.4)  K/uL


 


Mono # (Auto)  0.7    (0.0-0.8)  K/uL


 


Eos # (Auto)  0.1    (0.0-0.7)  K/uL


 


Baso # (Auto)  0.0    (0.0-0.1)  K/uL


 


Nucleated RBC %  0.0    /100WBC


 


Nucleated RBCs #  0    K/uL


 


Sodium   135 L   (136-148)  mmol/L


 


Potassium   3.4 L   (3.5-5.1)  mmol/L


 


Chloride   100   ()  mmol/L


 


Carbon Dioxide   25.9   (21.0-32.0)  mmol/L


 


BUN   16   (7.0-18.0)  mg/dL


 


Creatinine   0.9   (0.8-1.3)  mg/dL


 


Est Cr Clr Drug Dosing   83.65   mL/min


 


Estimated GFR (MDRD)   > 60.0   ml/min


 


Glucose   239 H   ()  mg/dL


 


Calcium   8.9   (8.5-10.1)  mg/dL


 


Total Bilirubin   0.7   (0.2-1.0)  mg/dL


 


AST   33   (15-37)  IU/L


 


ALT   53   (14-63)  IU/L


 


Alkaline Phosphatase   63   ()  U/L


 


Total Protein   7.7   (6.4-8.2)  g/dL


 


Albumin   3.6   (3.4-5.0)  g/dL


 


Globulin   4.1 H   (2.6-4.0)  g/dL


 


Albumin/Globulin Ratio   0.9   (0.9-1.6)  


 


Urine Color    YELLOW  


 


Urine Appearance    HAZY  


 


Urine pH    6.0  (5.0-8.0)  


 


Ur Specific Gravity    1.015  (1.001-1.035)  


 


Urine Protein    NEGATIVE  (NEGATIVE)  mg/dL


 


Urine Glucose (UA)    NEGATIVE  (NEGATIVE)  mg/dL


 


Urine Ketones    NEGATIVE  (NEGATIVE)  mg/dL


 


Urine Occult Blood    MODERATE H  (NEGATIVE)  


 


Urine Nitrite    POSITIVE H  (NEGATIVE)  


 


Urine Bilirubin    NEGATIVE  (NEGATIVE)  


 


Urine Urobilinogen    0.2  (<2.0)  EU/dL


 


Ur Leukocyte Esterase    SMALL H  (NEGATIVE)  


 


Urine RBC    6-8  (0-2/HPF)  


 


Urine WBC    10-15  (0-5/HPF)  


 


Ur Epithelial Cells    RARE  (NONE-FEW)  


 


Urine Bacteria    2+ H  (NEGATIVE)  











Meds: 


Medications











Generic Name Dose Route Start Last Admin





  Trade Name Freq  PRN Reason Stop Dose Admin


 


Docusate Sodium  100 mg  06/15/19 13:56  





  Colace  PO   





  BID PRN   





  Constipation   





     





     





     


 


Heparin Sodium (Porcine)  5,000 units  06/15/19 14:00  





  Heparin Sodium  SUBCUT   





  Q8H [facility]   





     





     





     





     


 


Levofloxacin/Dextrose 500 mg/  100 mls @ 100 mls/hr  06/15/19 13:33  06/15/19 13

:47





  Premix  IV  06/15/19 14:32  100 mls/hr





  ONETIME ONE   Administration





     





     





     





     


 


Sodium Chloride  1,000 mls @ 75 mls/hr  06/15/19 14:00  





  Normal Saline  IV   





  ASDIRECTED [facility]   





     





     





     





     


 


Ibuprofen  400 mg  06/15/19 13:56  





  Motrin  PO   





  Q6H PRN   





  Pain (mild 1-3)   





     





     





     


 


Insulin Aspart  0 unit  06/15/19 21:00  





  Novolog  SUBCUT   





  ACBREAKFASTANDBED [facility]   





     





     





  Protocol   





     


 


Ondansetron HCl  4 mg  06/15/19 13:56  





  Zofran Odt  PO   





  Q6H PRN   





  nausea, able to take PO   





     





     





     


 


Oxycodone HCl  5 mg  06/15/19 13:56  





  Oxycodone  PO   





  Q4H PRN   





  Pain (moderate 4-6)   





     





     





     


 


Sodium Chloride  10 ml  06/15/19 11:59  06/15/19 12:12





  Saline Flush  FLUSH   10 ml





  ASDIRECTED PRN   Administration





  Keep Vein Open   





     





     





     


 


Sodium Chloride  2.5 ml  06/15/19 11:59  06/15/19 12:12





  Saline Flush  FLUSH   2.5 ml





  ASDIRECTED PRN   Administration





  Keep Vein Open   





     





     





     


 


Temazepam  15 mg  06/15/19 13:56  





  Restoril  PO   





  BEDTIME PRN   





  Sleep   





     





     





     














Discontinued Medications














Generic Name Dose Route Start Last Admin





  Trade Name Freq  PRN Reason Stop Dose Admin


 


Sodium Chloride  1,000 mls @ 999 mls/hr  06/15/19 11:59  06/15/19 12:11





  Normal Saline  IV  06/15/19 12:59  999 mls/hr





  .Bolus ONE   Administration





     





     





     





     














Departure





- Departure


Time of Disposition: 14:22


Disposition: Refer to Observation


Condition: Good


Clinical Impression: 


Altered mental status


Qualifiers:


 Altered mental status type: unspecified Qualified Code(s): R41.82 - Altered 

mental status, unspecified








- Discharge Information


*PRESCRIPTION DRUG MONITORING PROGRAM REVIEWED*: No


*COPY OF PRESCRIPTION DRUG MONITORING REPORT IN PATIENT JOSE MIGUEL: No





- My Orders


Last 24 Hours: 


My Active Orders





06/15/19 11:58


EKG Documentation Completion [RC] STAT 


Head wo Cont [CT] Stat 


Saline Lock Insert [OM.PC] Stat 





06/15/19 11:59


Cervical Spine wo Cont [CT] Stat 


Sodium Chloride 0.9% [Saline Flush]   10 ml FLUSH ASDIRECTED PRN 


Sodium Chloride 0.9% [Saline Flush]   2.5 ml FLUSH ASDIRECTED PRN 





06/15/19 13:25


CULTURE URINE [RM] Routine 





06/15/19 13:32


Patient Status [ADT] Stat 





06/15/19 13:33


Levofloxacin/Dextrose 5%-Water [Levaquin in D5W 500 MG/100 ML] 500 mg   Premix 

Bag 1 bag IV ONETIME 














- Assessment/Plan


Last 24 Hours: 


My Active Orders





06/15/19 11:58


EKG Documentation Completion [RC] STAT 


Head wo Cont [CT] Stat 


Saline Lock Insert [OM.PC] Stat 





06/15/19 11:59


Cervical Spine wo Cont [CT] Stat 


Sodium Chloride 0.9% [Saline Flush]   10 ml FLUSH ASDIRECTED PRN 


Sodium Chloride 0.9% [Saline Flush]   2.5 ml FLUSH ASDIRECTED PRN 





06/15/19 13:25


CULTURE URINE [RM] Routine 





06/15/19 13:32


Patient Status [ADT] Stat 





06/15/19 13:33


Levofloxacin/Dextrose 5%-Water [Levaquin in D5W 500 MG/100 ML] 500 mg   Premix 

Bag 1 bag IV ONETIME

## 2019-06-16 LAB
CHLORIDE SERPL-SCNC: 105 MMOL/L (ref 98–107)
SODIUM SERPL-SCNC: 138 MMOL/L (ref 136–148)

## 2019-06-16 RX ADMIN — CEFTRIAXONE SCH MLS/HR: 1 INJECTION, SOLUTION INTRAVENOUS at 09:09

## 2019-06-16 RX ADMIN — DEXTROSE SCH UNITS: 10 SOLUTION INTRAVENOUS at 14:45

## 2019-06-16 RX ADMIN — DEXTROSE SCH UNITS: 10 SOLUTION INTRAVENOUS at 05:17

## 2019-06-16 RX ADMIN — DEXTROSE SCH UNITS: 10 SOLUTION INTRAVENOUS at 21:36

## 2019-06-16 NOTE — PCM.PN
<Byron Jiang - Last Filed: 06/16/19 08:55>





- General Info


Date of Service: 06/16/19


Subjective Update: 





59 y/o with history of developmental delay admitted for AMS and found to have a 

UTI. Alert and oriented x3. Denies any pain. Received 1 dose of haldol 

overnight for agitation.





- Patient Data


Vitals - Most Recent: 


 Last Vital Signs











Temp  36.0 C   06/16/19 08:00


 


Pulse  67   06/16/19 08:00


 


Resp  16   06/16/19 08:00


 


BP  136/82   06/16/19 08:00


 


Pulse Ox  97   06/16/19 08:00











Weight - Most Recent: 108.862 kg


I&O - Last 24 Hours: 


 Intake & Output











 06/15/19 06/16/19 06/16/19





 22:59 06:59 14:59


 


Intake Total 300 1599 


 


Output Total 150  


 


Balance 150 1599 











Lab Results Last 24 Hours: 


 Laboratory Results - last 24 hr











  06/15/19 06/15/19 06/15/19 Range/Units





  12:05 12:05 13:20 


 


WBC  10.01    (4.0-11.0)  K/uL


 


RBC  4.45 L    (4.50-5.90)  M/uL


 


Hgb  13.6    (13.0-17.0)  g/dL


 


Hct  40.7    (38.0-50.0)  %


 


MCV  91.5    (80.0-98.0)  fL


 


MCH  30.6    (27.0-32.0)  pg


 


MCHC  33.4    (31.0-37.0)  g/dL


 


RDW Std Deviation  45.1    (28.0-62.0)  fl


 


RDW Coeff of Arvind  14    (11.0-15.0)  %


 


Plt Count  194    (150-400)  K/uL


 


MPV  10.50    (7.40-12.00)  fL


 


Neut % (Auto)  77.9    (48.0-80.0)  %


 


Lymph % (Auto)  14.0 L    (16.0-40.0)  %


 


Mono % (Auto)  6.8    (0.0-15.0)  %


 


Eos % (Auto)  1.0    (0.0-7.0)  %


 


Baso % (Auto)  0.3    (0.0-1.5)  %


 


Neut # (Auto)  7.8 H    (1.4-5.7)  K/uL


 


Lymph # (Auto)  1.4    (0.6-2.4)  K/uL


 


Mono # (Auto)  0.7    (0.0-0.8)  K/uL


 


Eos # (Auto)  0.1    (0.0-0.7)  K/uL


 


Baso # (Auto)  0.0    (0.0-0.1)  K/uL


 


Nucleated RBC %  0.0    /100WBC


 


Nucleated RBCs #  0    K/uL


 


Sodium   135 L   (136-148)  mmol/L


 


Potassium   3.4 L   (3.5-5.1)  mmol/L


 


Chloride   100   ()  mmol/L


 


Carbon Dioxide   25.9   (21.0-32.0)  mmol/L


 


BUN   16   (7.0-18.0)  mg/dL


 


Creatinine   0.9   (0.8-1.3)  mg/dL


 


Est Cr Clr Drug Dosing   83.65   mL/min


 


Estimated GFR (MDRD)   > 60.0   ml/min


 


Glucose   239 H   ()  mg/dL


 


POC Glucose     ()  mg/dL


 


Calcium   8.9   (8.5-10.1)  mg/dL


 


Total Bilirubin   0.7   (0.2-1.0)  mg/dL


 


AST   33   (15-37)  IU/L


 


ALT   53   (14-63)  IU/L


 


Alkaline Phosphatase   63   ()  U/L


 


Total Protein   7.7   (6.4-8.2)  g/dL


 


Albumin   3.6   (3.4-5.0)  g/dL


 


Globulin   4.1 H   (2.6-4.0)  g/dL


 


Albumin/Globulin Ratio   0.9   (0.9-1.6)  


 


Urine Color    YELLOW  


 


Urine Appearance    HAZY  


 


Urine pH    6.0  (5.0-8.0)  


 


Ur Specific Gravity    1.015  (1.001-1.035)  


 


Urine Protein    NEGATIVE  (NEGATIVE)  mg/dL


 


Urine Glucose (UA)    NEGATIVE  (NEGATIVE)  mg/dL


 


Urine Ketones    NEGATIVE  (NEGATIVE)  mg/dL


 


Urine Occult Blood    MODERATE H  (NEGATIVE)  


 


Urine Nitrite    POSITIVE H  (NEGATIVE)  


 


Urine Bilirubin    NEGATIVE  (NEGATIVE)  


 


Urine Urobilinogen    0.2  (<2.0)  EU/dL


 


Ur Leukocyte Esterase    SMALL H  (NEGATIVE)  


 


Urine RBC    6-8  (0-2/HPF)  


 


Urine WBC    10-15  (0-5/HPF)  


 


Ur Epithelial Cells    RARE  (NONE-FEW)  


 


Urine Bacteria    2+ H  (NEGATIVE)  














  06/15/19 06/15/19 06/16/19 Range/Units





  15:19 22:49 06:30 


 


WBC     (4.0-11.0)  K/uL


 


RBC     (4.50-5.90)  M/uL


 


Hgb     (13.0-17.0)  g/dL


 


Hct     (38.0-50.0)  %


 


MCV     (80.0-98.0)  fL


 


MCH     (27.0-32.0)  pg


 


MCHC     (31.0-37.0)  g/dL


 


RDW Std Deviation     (28.0-62.0)  fl


 


RDW Coeff of Arvind     (11.0-15.0)  %


 


Plt Count     (150-400)  K/uL


 


MPV     (7.40-12.00)  fL


 


Neut % (Auto)     (48.0-80.0)  %


 


Lymph % (Auto)     (16.0-40.0)  %


 


Mono % (Auto)     (0.0-15.0)  %


 


Eos % (Auto)     (0.0-7.0)  %


 


Baso % (Auto)     (0.0-1.5)  %


 


Neut # (Auto)     (1.4-5.7)  K/uL


 


Lymph # (Auto)     (0.6-2.4)  K/uL


 


Mono # (Auto)     (0.0-0.8)  K/uL


 


Eos # (Auto)     (0.0-0.7)  K/uL


 


Baso # (Auto)     (0.0-0.1)  K/uL


 


Nucleated RBC %     /100WBC


 


Nucleated RBCs #     K/uL


 


Sodium     (136-148)  mmol/L


 


Potassium     (3.5-5.1)  mmol/L


 


Chloride     ()  mmol/L


 


Carbon Dioxide     (21.0-32.0)  mmol/L


 


BUN     (7.0-18.0)  mg/dL


 


Creatinine     (0.8-1.3)  mg/dL


 


Est Cr Clr Drug Dosing     mL/min


 


Estimated GFR (MDRD)     ml/min


 


Glucose     ()  mg/dL


 


POC Glucose  302 H  290 H  185 H  ()  mg/dL


 


Calcium     (8.5-10.1)  mg/dL


 


Total Bilirubin     (0.2-1.0)  mg/dL


 


AST     (15-37)  IU/L


 


ALT     (14-63)  IU/L


 


Alkaline Phosphatase     ()  U/L


 


Total Protein     (6.4-8.2)  g/dL


 


Albumin     (3.4-5.0)  g/dL


 


Globulin     (2.6-4.0)  g/dL


 


Albumin/Globulin Ratio     (0.9-1.6)  


 


Urine Color     


 


Urine Appearance     


 


Urine pH     (5.0-8.0)  


 


Ur Specific Gravity     (1.001-1.035)  


 


Urine Protein     (NEGATIVE)  mg/dL


 


Urine Glucose (UA)     (NEGATIVE)  mg/dL


 


Urine Ketones     (NEGATIVE)  mg/dL


 


Urine Occult Blood     (NEGATIVE)  


 


Urine Nitrite     (NEGATIVE)  


 


Urine Bilirubin     (NEGATIVE)  


 


Urine Urobilinogen     (<2.0)  EU/dL


 


Ur Leukocyte Esterase     (NEGATIVE)  


 


Urine RBC     (0-2/HPF)  


 


Urine WBC     (0-5/HPF)  


 


Ur Epithelial Cells     (NONE-FEW)  


 


Urine Bacteria     (NEGATIVE)  














  06/16/19 Range/Units





  08:35 


 


WBC  5.23  (4.0-11.0)  K/uL


 


RBC  3.85 L  (4.50-5.90)  M/uL


 


Hgb  11.6 L  (13.0-17.0)  g/dL


 


Hct  36.3 L  (38.0-50.0)  %


 


MCV  94.3  (80.0-98.0)  fL


 


MCH  30.1  (27.0-32.0)  pg


 


MCHC  32.0  (31.0-37.0)  g/dL


 


RDW Std Deviation  47.8  (28.0-62.0)  fl


 


RDW Coeff of Arvind  14  (11.0-15.0)  %


 


Plt Count  163  (150-400)  K/uL


 


MPV  10.30  (7.40-12.00)  fL


 


Neut % (Auto)  64.7  (48.0-80.0)  %


 


Lymph % (Auto)  22.8  (16.0-40.0)  %


 


Mono % (Auto)  9.4  (0.0-15.0)  %


 


Eos % (Auto)  2.7  (0.0-7.0)  %


 


Baso % (Auto)  0.4  (0.0-1.5)  %


 


Neut # (Auto)  3.4  (1.4-5.7)  K/uL


 


Lymph # (Auto)  1.2  (0.6-2.4)  K/uL


 


Mono # (Auto)  0.5  (0.0-0.8)  K/uL


 


Eos # (Auto)  0.1  (0.0-0.7)  K/uL


 


Baso # (Auto)  0.0  (0.0-0.1)  K/uL


 


Nucleated RBC %  0.0  /100WBC


 


Nucleated RBCs #  0  K/uL


 


Sodium   (136-148)  mmol/L


 


Potassium   (3.5-5.1)  mmol/L


 


Chloride   ()  mmol/L


 


Carbon Dioxide   (21.0-32.0)  mmol/L


 


BUN   (7.0-18.0)  mg/dL


 


Creatinine   (0.8-1.3)  mg/dL


 


Est Cr Clr Drug Dosing   mL/min


 


Estimated GFR (MDRD)   ml/min


 


Glucose   ()  mg/dL


 


POC Glucose   ()  mg/dL


 


Calcium   (8.5-10.1)  mg/dL


 


Total Bilirubin   (0.2-1.0)  mg/dL


 


AST   (15-37)  IU/L


 


ALT   (14-63)  IU/L


 


Alkaline Phosphatase   ()  U/L


 


Total Protein   (6.4-8.2)  g/dL


 


Albumin   (3.4-5.0)  g/dL


 


Globulin   (2.6-4.0)  g/dL


 


Albumin/Globulin Ratio   (0.9-1.6)  


 


Urine Color   


 


Urine Appearance   


 


Urine pH   (5.0-8.0)  


 


Ur Specific Gravity   (1.001-1.035)  


 


Urine Protein   (NEGATIVE)  mg/dL


 


Urine Glucose (UA)   (NEGATIVE)  mg/dL


 


Urine Ketones   (NEGATIVE)  mg/dL


 


Urine Occult Blood   (NEGATIVE)  


 


Urine Nitrite   (NEGATIVE)  


 


Urine Bilirubin   (NEGATIVE)  


 


Urine Urobilinogen   (<2.0)  EU/dL


 


Ur Leukocyte Esterase   (NEGATIVE)  


 


Urine RBC   (0-2/HPF)  


 


Urine WBC   (0-5/HPF)  


 


Ur Epithelial Cells   (NONE-FEW)  


 


Urine Bacteria   (NEGATIVE)  











Med Orders - Current: 


 Current Medications





Docusate Sodium (Colace)  100 mg PO BID PRN


   PRN Reason: Constipation


Heparin Sodium (Porcine) (Heparin Sodium)  5,000 units SUBCUT Q8H [facility]


   Last Admin: 06/16/19 05:17 Dose:  5,000 units


Sodium Chloride (Normal Saline)  1,000 mls @ 75 mls/hr IV ASDIRECTED [facility]


   Last Admin: 06/16/19 04:08 Dose:  75 mls/hr


Ibuprofen (Motrin)  400 mg PO Q6H PRN


   PRN Reason: Pain (mild 1-3)


Insulin Aspart (Novolog)  0 unit SUBCUT TIDAC [facility]; Protocol


   Last Admin: 06/15/19 17:57 Dose:  Not Given


Ondansetron HCl (Zofran Odt)  4 mg PO Q6H PRN


   PRN Reason: nausea, able to take PO


Oxycodone HCl (Oxycodone)  5 mg PO Q4H PRN


   PRN Reason: Pain (moderate 4-6)


Sodium Chloride (Saline Flush)  10 ml FLUSH ASDIRECTED PRN


   PRN Reason: Keep Vein Open


   Last Admin: 06/15/19 12:12 Dose:  10 ml


Sodium Chloride (Saline Flush)  2.5 ml FLUSH ASDIRECTED PRN


   PRN Reason: Keep Vein Open


   Last Admin: 06/15/19 12:12 Dose:  2.5 ml


Temazepam (Restoril)  15 mg PO BEDTIME PRN


   PRN Reason: Sleep





Discontinued Medications





Haloperidol (Haldol)  5 mg PO ONETIME ONE


   Stop: 06/15/19 15:04


   Last Admin: 06/15/19 15:11 Dose:  5 mg


Sodium Chloride (Normal Saline)  1,000 mls @ 999 mls/hr IV .Bolus ONE


   Stop: 06/15/19 12:59


   Last Admin: 06/15/19 12:11 Dose:  999 mls/hr


Levofloxacin/Dextrose 500 mg/ (Premix)  100 mls @ 100 mls/hr IV ONETIME ONE


   Stop: 06/15/19 14:32


   Last Admin: 06/15/19 13:47 Dose:  100 mls/hr


Insulin Aspart (Novolog)  0 unit SUBCUT ACBREAKFASTANDBED [facility]; Protocol











- Exam


General: Alert, Oriented, Cooperative, No Acute Distress


Lungs: Clear to Auscultation, Normal Respiratory Effort, Wheezing.  No: Crackles


Cardiovascular: Regular Rate, Regular Rhythm


GI/Abdominal Exam: Normal Bowel Sounds, Soft, Non-Tender


Extremities: Normal Inspection, No Pedal Edema





- Problem List Review


Problem List Initiated/Reviewed/Updated: Yes





- Plan


Plan:: 











A:


1. UTI


2. self care deficit


3. PMH developmental delay





P:


1. UTI- pending urine culture. ordered ceftriaxone daily.


2. Self care deficit- pending placement





Dispo: pending placement














<Cordell Asher - Last Filed: 06/16/19 14:24>





- General Info


Admission Dx/Problem (Free Text): 





I have seen and examined to patient independently of medical resident, Byron Cabrera MD. I have discussed the case for care of this patient with him. I 

have reviewed and approve of the plan of care as outlined by medical resident. 

Please see orders. 








- Patient Data


Vitals - Most Recent: 


 Last Vital Signs











Temp  36.3 C   06/16/19 12:00


 


Pulse  75   06/16/19 12:00


 


Resp  18   06/16/19 12:00


 


BP  140/74   06/16/19 12:00


 


Pulse Ox  96   06/16/19 14:00











I&O - Last 24 Hours: 


 Intake & Output











 06/15/19 06/16/19 06/16/19





 22:59 06:59 14:59


 


Intake Total 300 1599 50


 


Output Total 150  


 


Balance 150 1599 50











Lab Results Last 24 Hours: 


 Laboratory Results - last 24 hr











  06/15/19 06/15/19 06/16/19 Range/Units





  15:19 22:49 06:30 


 


WBC     (4.0-11.0)  K/uL


 


RBC     (4.50-5.90)  M/uL


 


Hgb     (13.0-17.0)  g/dL


 


Hct     (38.0-50.0)  %


 


MCV     (80.0-98.0)  fL


 


MCH     (27.0-32.0)  pg


 


MCHC     (31.0-37.0)  g/dL


 


RDW Std Deviation     (28.0-62.0)  fl


 


RDW Coeff of Arvind     (11.0-15.0)  %


 


Plt Count     (150-400)  K/uL


 


MPV     (7.40-12.00)  fL


 


Neut % (Auto)     (48.0-80.0)  %


 


Lymph % (Auto)     (16.0-40.0)  %


 


Mono % (Auto)     (0.0-15.0)  %


 


Eos % (Auto)     (0.0-7.0)  %


 


Baso % (Auto)     (0.0-1.5)  %


 


Neut # (Auto)     (1.4-5.7)  K/uL


 


Lymph # (Auto)     (0.6-2.4)  K/uL


 


Mono # (Auto)     (0.0-0.8)  K/uL


 


Eos # (Auto)     (0.0-0.7)  K/uL


 


Baso # (Auto)     (0.0-0.1)  K/uL


 


Nucleated RBC %     /100WBC


 


Nucleated RBCs #     K/uL


 


Sodium     (136-148)  mmol/L


 


Potassium     (3.5-5.1)  mmol/L


 


Chloride     ()  mmol/L


 


Carbon Dioxide     (21.0-32.0)  mmol/L


 


BUN     (7.0-18.0)  mg/dL


 


Creatinine     (0.8-1.3)  mg/dL


 


Est Cr Clr Drug Dosing     mL/min


 


Estimated GFR (MDRD)     ml/min


 


Glucose     ()  mg/dL


 


POC Glucose  302 H  290 H  185 H  ()  mg/dL


 


Calcium     (8.5-10.1)  mg/dL


 


Total Bilirubin     (0.2-1.0)  mg/dL


 


AST     (15-37)  IU/L


 


ALT     (14-63)  IU/L


 


Alkaline Phosphatase     ()  U/L


 


Total Protein     (6.4-8.2)  g/dL


 


Albumin     (3.4-5.0)  g/dL


 


Globulin     (2.6-4.0)  g/dL


 


Albumin/Globulin Ratio     (0.9-1.6)  


 


Triglycerides     (0-200)  mg/dL


 


Cholesterol     ()  mg/dL


 


LDL Cholesterol, Calc     ()  mg/dL


 


VLDL Cholesterol     (5-55)  mg/dL


 


HDL Cholesterol     (40-60)  mg/dL


 


Cholesterol/HDL Ratio     (3.3-6.0)  














  06/16/19 06/16/19 06/16/19 Range/Units





  08:35 08:35 08:35 


 


WBC  5.23    (4.0-11.0)  K/uL


 


RBC  3.85 L    (4.50-5.90)  M/uL


 


Hgb  11.6 L    (13.0-17.0)  g/dL


 


Hct  36.3 L    (38.0-50.0)  %


 


MCV  94.3    (80.0-98.0)  fL


 


MCH  30.1    (27.0-32.0)  pg


 


MCHC  32.0    (31.0-37.0)  g/dL


 


RDW Std Deviation  47.8    (28.0-62.0)  fl


 


RDW Coeff of Arvind  14    (11.0-15.0)  %


 


Plt Count  163    (150-400)  K/uL


 


MPV  10.30    (7.40-12.00)  fL


 


Neut % (Auto)  64.7    (48.0-80.0)  %


 


Lymph % (Auto)  22.8    (16.0-40.0)  %


 


Mono % (Auto)  9.4    (0.0-15.0)  %


 


Eos % (Auto)  2.7    (0.0-7.0)  %


 


Baso % (Auto)  0.4    (0.0-1.5)  %


 


Neut # (Auto)  3.4    (1.4-5.7)  K/uL


 


Lymph # (Auto)  1.2    (0.6-2.4)  K/uL


 


Mono # (Auto)  0.5    (0.0-0.8)  K/uL


 


Eos # (Auto)  0.1    (0.0-0.7)  K/uL


 


Baso # (Auto)  0.0    (0.0-0.1)  K/uL


 


Nucleated RBC %  0.0    /100WBC


 


Nucleated RBCs #  0    K/uL


 


Sodium   138   (136-148)  mmol/L


 


Potassium   4.0   (3.5-5.1)  mmol/L


 


Chloride   105   ()  mmol/L


 


Carbon Dioxide   28.7   (21.0-32.0)  mmol/L


 


BUN   11   (7.0-18.0)  mg/dL


 


Creatinine   0.8   (0.8-1.3)  mg/dL


 


Est Cr Clr Drug Dosing   94.10   mL/min


 


Estimated GFR (MDRD)   > 60.0   ml/min


 


Glucose   198 H   ()  mg/dL


 


POC Glucose     ()  mg/dL


 


Calcium   8.1 L   (8.5-10.1)  mg/dL


 


Total Bilirubin   0.4   (0.2-1.0)  mg/dL


 


AST   22   (15-37)  IU/L


 


ALT   41   (14-63)  IU/L


 


Alkaline Phosphatase   51   ()  U/L


 


Total Protein   5.9 L   (6.4-8.2)  g/dL


 


Albumin   2.9 L   (3.4-5.0)  g/dL


 


Globulin   3.0   (2.6-4.0)  g/dL


 


Albumin/Globulin Ratio   1.0   (0.9-1.6)  


 


Triglycerides    136  (0-200)  mg/dL


 


Cholesterol    160  ()  mg/dL


 


LDL Cholesterol, Calc    85  ()  mg/dL


 


VLDL Cholesterol    27  (5-55)  mg/dL


 


HDL Cholesterol    48  (40-60)  mg/dL


 


Cholesterol/HDL Ratio    3.3  (3.3-6.0)  














  06/16/19 Range/Units





  12:42 


 


WBC   (4.0-11.0)  K/uL


 


RBC   (4.50-5.90)  M/uL


 


Hgb   (13.0-17.0)  g/dL


 


Hct   (38.0-50.0)  %


 


MCV   (80.0-98.0)  fL


 


MCH   (27.0-32.0)  pg


 


MCHC   (31.0-37.0)  g/dL


 


RDW Std Deviation   (28.0-62.0)  fl


 


RDW Coeff of Arvind   (11.0-15.0)  %


 


Plt Count   (150-400)  K/uL


 


MPV   (7.40-12.00)  fL


 


Neut % (Auto)   (48.0-80.0)  %


 


Lymph % (Auto)   (16.0-40.0)  %


 


Mono % (Auto)   (0.0-15.0)  %


 


Eos % (Auto)   (0.0-7.0)  %


 


Baso % (Auto)   (0.0-1.5)  %


 


Neut # (Auto)   (1.4-5.7)  K/uL


 


Lymph # (Auto)   (0.6-2.4)  K/uL


 


Mono # (Auto)   (0.0-0.8)  K/uL


 


Eos # (Auto)   (0.0-0.7)  K/uL


 


Baso # (Auto)   (0.0-0.1)  K/uL


 


Nucleated RBC %   /100WBC


 


Nucleated RBCs #   K/uL


 


Sodium   (136-148)  mmol/L


 


Potassium   (3.5-5.1)  mmol/L


 


Chloride   ()  mmol/L


 


Carbon Dioxide   (21.0-32.0)  mmol/L


 


BUN   (7.0-18.0)  mg/dL


 


Creatinine   (0.8-1.3)  mg/dL


 


Est Cr Clr Drug Dosing   mL/min


 


Estimated GFR (MDRD)   ml/min


 


Glucose   ()  mg/dL


 


POC Glucose  217 H  ()  mg/dL


 


Calcium   (8.5-10.1)  mg/dL


 


Total Bilirubin   (0.2-1.0)  mg/dL


 


AST   (15-37)  IU/L


 


ALT   (14-63)  IU/L


 


Alkaline Phosphatase   ()  U/L


 


Total Protein   (6.4-8.2)  g/dL


 


Albumin   (3.4-5.0)  g/dL


 


Globulin   (2.6-4.0)  g/dL


 


Albumin/Globulin Ratio   (0.9-1.6)  


 


Triglycerides   (0-200)  mg/dL


 


Cholesterol   ()  mg/dL


 


LDL Cholesterol, Calc   ()  mg/dL


 


VLDL Cholesterol   (5-55)  mg/dL


 


HDL Cholesterol   (40-60)  mg/dL


 


Cholesterol/HDL Ratio   (3.3-6.0)  











Med Orders - Current: 


 Current Medications





Docusate Sodium (Colace)  100 mg PO BID PRN


   PRN Reason: Constipation


Heparin Sodium (Porcine) (Heparin Sodium)  5,000 units SUBCUT Q8H [facility]


   Last Admin: 06/16/19 05:17 Dose:  5,000 units


Sodium Chloride (Normal Saline)  1,000 mls @ 75 mls/hr IV ASDIRECTED [facility]


   Last Admin: 06/16/19 04:08 Dose:  75 mls/hr


Ceftriaxone Sodium/Dextrose 1 (gm/ Premix)  50 mls @ 100 mls/hr IV Q24H [facility]


   Last Admin: 06/16/19 09:09 Dose:  100 mls/hr


Ibuprofen (Motrin)  400 mg PO Q6H PRN


   PRN Reason: Pain (mild 1-3)


Insulin Aspart (Novolog)  0 unit SUBCUT TIDAC [facility]; Protocol


   Last Admin: 06/16/19 09:31 Dose:  Not Given


Ondansetron HCl (Zofran Odt)  4 mg PO Q6H PRN


   PRN Reason: nausea, able to take PO


Oxycodone HCl (Oxycodone)  5 mg PO Q4H PRN


   PRN Reason: Pain (moderate 4-6)


Sodium Chloride (Saline Flush)  10 ml FLUSH ASDIRECTED PRN


   PRN Reason: Keep Vein Open


   Last Admin: 06/15/19 12:12 Dose:  10 ml


Sodium Chloride (Saline Flush)  2.5 ml FLUSH ASDIRECTED PRN


   PRN Reason: Keep Vein Open


   Last Admin: 06/15/19 12:12 Dose:  2.5 ml


Temazepam (Restoril)  15 mg PO BEDTIME PRN


   PRN Reason: Sleep





Discontinued Medications





Haloperidol (Haldol)  5 mg PO ONETIME ONE


   Stop: 06/15/19 15:04


   Last Admin: 06/15/19 15:11 Dose:  5 mg


Sodium Chloride (Normal Saline)  1,000 mls @ 999 mls/hr IV .Bolus ONE


   Stop: 06/15/19 12:59


   Last Admin: 06/15/19 12:11 Dose:  999 mls/hr


Levofloxacin/Dextrose 500 mg/ (Premix)  100 mls @ 100 mls/hr IV ONETIME ONE


   Stop: 06/15/19 14:32


   Last Admin: 06/15/19 13:47 Dose:  100 mls/hr


Insulin Aspart (Novolog)  0 unit SUBCUT ACBREAKFASTANDBED ZHAO; Protocol











- Problem List & Annotations


(1) Altered mental status


SNOMED Code(s): 312964703


   Code(s): R41.82 - ALTERED MENTAL STATUS, UNSPECIFIED   Status: Acute   

Priority: High   Current Visit: Yes   


Qualifiers: 


   Altered mental status type: unspecified   Qualified Code(s): R41.82 - 

Altered mental status, unspecified   





(2) Delayed emotional development


SNOMED Code(s): 247856955


   Code(s): F88 - OTHER DISORDERS OF PSYCHOLOGICAL DEVELOPMENT   Status: 

Chronic   Priority: High   Current Visit: Yes   





(3) Diabetes mellitus type 2 in obese


SNOMED Code(s): 26975594


   Code(s): E11.69 - TYPE 2 DIABETES MELLITUS WITH OTHER SPECIFIED COMPLICATION

; E66.9 - OBESITY, UNSPECIFIED   Status: Chronic   Priority: High   Current 

Visit: Yes   





(4) Noncompliance


SNOMED Code(s): 0067203


   Code(s): Z91.19 - PATIENT'S NONCOMPLIANCE W OTH MEDICAL TREATMENT AND 

REGIMEN   Status: Chronic   Priority: Medium   Current Visit: Yes   





(5) Total self-care deficit


SNOMED Code(s): 91320050


   Code(s): R41.89 - OTH SYMPTOMS AND SIGNS W COGNITIVE FUNCTIONS AND AWARENESS

   Status: Chronic   Priority: High   Current Visit: Yes   





- My Orders


Last 24 Hours: 


My Active Orders





06/15/19 13:56


Oxygen Therapy [RC] PRN 


Up With Assistance [RC] ASDIRECTED 


VTE/DVT Education [RC] PER UNIT ROUTINE 


Vital Signs [RC] Q4H 


Docusate Sodium [Colace]   100 mg PO BID PRN 


Ibuprofen [Motrin]   400 mg PO Q6H PRN 


Ondansetron [Zofran ODT]   4 mg PO Q6H PRN 


Temazepam [Restoril]   15 mg PO BEDTIME PRN 


oxyCODONE   5 mg PO Q4H PRN 


Glucose Management Sub Q Reflex [OM.PC] Click To Edit 


Resuscitation Status Routine 





06/15/19 13:57


Diabetes Education [RC] Click to Edit 





06/15/19 14:00


Heparin Sodium   5,000 units SUBCUT Q8H 


Sodium Chloride 0.9% [Normal Saline] 1,000 ml IV ASDIRECTED 





06/15/19 14:52


Admission Status [Patient Status] [ADT] Routine 





06/15/19 15:34


Blood Glucose Check, Bedside [RC] WITHMEALSANDBED 


Oxygen Therapy [RC] PRN 


VTE/DVT Education [RC] PER UNIT ROUTINE 


Vital Signs [RC] Q4H 





06/15/19 15:38


Diabetes Education [RC] Click to Edit 


Oxygen Therapy [RC] PRN 


VTE/DVT Education [RC] PER UNIT ROUTINE 


Vital Signs [RC] Q4H 


Glucose Management Sub Q Reflex [OM.PC] Click To Edit 





06/15/19 17:00


Insulin Aspart [NovoLOG]   See Protocol  SUBCUT TIDAC 





06/15/19 Dinner


[employer] Diet [DIET]

## 2019-06-17 LAB
CHLORIDE SERPL-SCNC: 104 MMOL/L (ref 98–107)
SODIUM SERPL-SCNC: 137 MMOL/L (ref 136–148)

## 2019-06-17 RX ADMIN — DEXTROSE SCH UNITS: 10 SOLUTION INTRAVENOUS at 13:57

## 2019-06-17 RX ADMIN — CEFTRIAXONE SCH MLS/HR: 1 INJECTION, SOLUTION INTRAVENOUS at 09:03

## 2019-06-17 RX ADMIN — DEXTROSE SCH UNITS: 10 SOLUTION INTRAVENOUS at 06:03

## 2019-06-17 RX ADMIN — DEXTROSE SCH UNITS: 10 SOLUTION INTRAVENOUS at 21:55

## 2019-06-17 NOTE — PCM.PN
<Kimberli Levy M - Last Filed: 06/17/19 08:54>





- General Info


Date of Service: 06/17/19


Admission Dx/Problem (Free Text): 





AMS, UTI


 





Subjective Update: 





Denies any concerns today. No pain. Eating and drinking good. No chest pain or 

SOB. 


Functional Status: Reports: Pain Controlled, Tolerating Diet, Ambulating, 

Urinating





- Review of Systems


General: Reports: No Symptoms.  Denies: Fever, Weakness, Fatigue


HEENT: Reports: No Symptoms.  Denies: Headaches, Sore Throat, Visual Changes


Pulmonary: Reports: No Symptoms.  Denies: Shortness of Breath, Cough, Sputum


Cardiovascular: Reports: No Symptoms


Gastrointestinal: Reports: No Symptoms.  Denies: Abdominal Pain, Nausea, 

Vomiting


Genitourinary: Reports: No Symptoms.  Denies: Dysuria, Frequency


Musculoskeletal: Reports: No Symptoms


Skin: Reports: No Symptoms


Neurological: Reports: No Symptoms


Psychiatric: Reports: No Symptoms





- Patient Data


Vitals - Most Recent: 


 Last Vital Signs











Temp  97.6 F   06/17/19 07:45


 


Pulse  78   06/17/19 07:45


 


Resp  16   06/17/19 07:45


 


BP  171/87 H  06/17/19 07:45


 


Pulse Ox  93 L  06/17/19 07:45











Weight - Most Recent: 108.862 kg


I&O - Last 24 Hours: 


 Intake & Output











 06/16/19 06/17/19 06/17/19





 22:59 06:59 14:59


 


Intake Total 2489 1959 


 


Output Total 0 200 


 


Balance 2489 1759 











Lab Results Last 24 Hours: 


 Laboratory Results - last 24 hr











  06/16/19 06/16/19 06/16/19 Range/Units





  08:35 08:35 08:35 


 


WBC  5.23    (4.0-11.0)  K/uL


 


RBC  3.85 L    (4.50-5.90)  M/uL


 


Hgb  11.6 L    (13.0-17.0)  g/dL


 


Hct  36.3 L    (38.0-50.0)  %


 


MCV  94.3    (80.0-98.0)  fL


 


MCH  30.1    (27.0-32.0)  pg


 


MCHC  32.0    (31.0-37.0)  g/dL


 


RDW Std Deviation  47.8    (28.0-62.0)  fl


 


RDW Coeff of Arvind  14    (11.0-15.0)  %


 


Plt Count  163    (150-400)  K/uL


 


MPV  10.30    (7.40-12.00)  fL


 


Neut % (Auto)  64.7    (48.0-80.0)  %


 


Lymph % (Auto)  22.8    (16.0-40.0)  %


 


Mono % (Auto)  9.4    (0.0-15.0)  %


 


Eos % (Auto)  2.7    (0.0-7.0)  %


 


Baso % (Auto)  0.4    (0.0-1.5)  %


 


Neut # (Auto)  3.4    (1.4-5.7)  K/uL


 


Lymph # (Auto)  1.2    (0.6-2.4)  K/uL


 


Mono # (Auto)  0.5    (0.0-0.8)  K/uL


 


Eos # (Auto)  0.1    (0.0-0.7)  K/uL


 


Baso # (Auto)  0.0    (0.0-0.1)  K/uL


 


Nucleated RBC %  0.0    /100WBC


 


Nucleated RBCs #  0    K/uL


 


Sodium   138   (136-148)  mmol/L


 


Potassium   4.0   (3.5-5.1)  mmol/L


 


Chloride   105   ()  mmol/L


 


Carbon Dioxide   28.7   (21.0-32.0)  mmol/L


 


BUN   11   (7.0-18.0)  mg/dL


 


Creatinine   0.8   (0.8-1.3)  mg/dL


 


Est Cr Clr Drug Dosing   94.10   mL/min


 


Estimated GFR (MDRD)   > 60.0   ml/min


 


Glucose   198 H   ()  mg/dL


 


POC Glucose     ()  mg/dL


 


Calcium   8.1 L   (8.5-10.1)  mg/dL


 


Total Bilirubin   0.4   (0.2-1.0)  mg/dL


 


AST   22   (15-37)  IU/L


 


ALT   41   (14-63)  IU/L


 


Alkaline Phosphatase   51   ()  U/L


 


Total Protein   5.9 L   (6.4-8.2)  g/dL


 


Albumin   2.9 L   (3.4-5.0)  g/dL


 


Globulin   3.0   (2.6-4.0)  g/dL


 


Albumin/Globulin Ratio   1.0   (0.9-1.6)  


 


Triglycerides    136  (0-200)  mg/dL


 


Cholesterol    160  ()  mg/dL


 


LDL Cholesterol, Calc    85  ()  mg/dL


 


VLDL Cholesterol    27  (5-55)  mg/dL


 


HDL Cholesterol    48  (40-60)  mg/dL


 


Cholesterol/HDL Ratio    3.3  (3.3-6.0)  














  06/16/19 06/16/19 06/17/19 Range/Units





  12:42 16:42 05:51 


 


WBC     (4.0-11.0)  K/uL


 


RBC     (4.50-5.90)  M/uL


 


Hgb     (13.0-17.0)  g/dL


 


Hct     (38.0-50.0)  %


 


MCV     (80.0-98.0)  fL


 


MCH     (27.0-32.0)  pg


 


MCHC     (31.0-37.0)  g/dL


 


RDW Std Deviation     (28.0-62.0)  fl


 


RDW Coeff of Arvind     (11.0-15.0)  %


 


Plt Count     (150-400)  K/uL


 


MPV     (7.40-12.00)  fL


 


Neut % (Auto)     (48.0-80.0)  %


 


Lymph % (Auto)     (16.0-40.0)  %


 


Mono % (Auto)     (0.0-15.0)  %


 


Eos % (Auto)     (0.0-7.0)  %


 


Baso % (Auto)     (0.0-1.5)  %


 


Neut # (Auto)     (1.4-5.7)  K/uL


 


Lymph # (Auto)     (0.6-2.4)  K/uL


 


Mono # (Auto)     (0.0-0.8)  K/uL


 


Eos # (Auto)     (0.0-0.7)  K/uL


 


Baso # (Auto)     (0.0-0.1)  K/uL


 


Nucleated RBC %     /100WBC


 


Nucleated RBCs #     K/uL


 


Sodium    137  (136-148)  mmol/L


 


Potassium    4.1  (3.5-5.1)  mmol/L


 


Chloride    104  ()  mmol/L


 


Carbon Dioxide    29.6  (21.0-32.0)  mmol/L


 


BUN    15  (7.0-18.0)  mg/dL


 


Creatinine    0.8  (0.8-1.3)  mg/dL


 


Est Cr Clr Drug Dosing    94.10  mL/min


 


Estimated GFR (MDRD)    > 60.0  ml/min


 


Glucose    183 H  ()  mg/dL


 


POC Glucose  217 H  256 H   ()  mg/dL


 


Calcium    8.6  (8.5-10.1)  mg/dL


 


Total Bilirubin     (0.2-1.0)  mg/dL


 


AST     (15-37)  IU/L


 


ALT     (14-63)  IU/L


 


Alkaline Phosphatase     ()  U/L


 


Total Protein     (6.4-8.2)  g/dL


 


Albumin     (3.4-5.0)  g/dL


 


Globulin     (2.6-4.0)  g/dL


 


Albumin/Globulin Ratio     (0.9-1.6)  


 


Triglycerides     (0-200)  mg/dL


 


Cholesterol     ()  mg/dL


 


LDL Cholesterol, Calc     ()  mg/dL


 


VLDL Cholesterol     (5-55)  mg/dL


 


HDL Cholesterol     (40-60)  mg/dL


 


Cholesterol/HDL Ratio     (3.3-6.0)  














  06/17/19 Range/Units





  06:05 


 


WBC   (4.0-11.0)  K/uL


 


RBC   (4.50-5.90)  M/uL


 


Hgb   (13.0-17.0)  g/dL


 


Hct   (38.0-50.0)  %


 


MCV   (80.0-98.0)  fL


 


MCH   (27.0-32.0)  pg


 


MCHC   (31.0-37.0)  g/dL


 


RDW Std Deviation   (28.0-62.0)  fl


 


RDW Coeff of Arvind   (11.0-15.0)  %


 


Plt Count   (150-400)  K/uL


 


MPV   (7.40-12.00)  fL


 


Neut % (Auto)   (48.0-80.0)  %


 


Lymph % (Auto)   (16.0-40.0)  %


 


Mono % (Auto)   (0.0-15.0)  %


 


Eos % (Auto)   (0.0-7.0)  %


 


Baso % (Auto)   (0.0-1.5)  %


 


Neut # (Auto)   (1.4-5.7)  K/uL


 


Lymph # (Auto)   (0.6-2.4)  K/uL


 


Mono # (Auto)   (0.0-0.8)  K/uL


 


Eos # (Auto)   (0.0-0.7)  K/uL


 


Baso # (Auto)   (0.0-0.1)  K/uL


 


Nucleated RBC %   /100WBC


 


Nucleated RBCs #   K/uL


 


Sodium   (136-148)  mmol/L


 


Potassium   (3.5-5.1)  mmol/L


 


Chloride   ()  mmol/L


 


Carbon Dioxide   (21.0-32.0)  mmol/L


 


BUN   (7.0-18.0)  mg/dL


 


Creatinine   (0.8-1.3)  mg/dL


 


Est Cr Clr Drug Dosing   mL/min


 


Estimated GFR (MDRD)   ml/min


 


Glucose   ()  mg/dL


 


POC Glucose  174 H  ()  mg/dL


 


Calcium   (8.5-10.1)  mg/dL


 


Total Bilirubin   (0.2-1.0)  mg/dL


 


AST   (15-37)  IU/L


 


ALT   (14-63)  IU/L


 


Alkaline Phosphatase   ()  U/L


 


Total Protein   (6.4-8.2)  g/dL


 


Albumin   (3.4-5.0)  g/dL


 


Globulin   (2.6-4.0)  g/dL


 


Albumin/Globulin Ratio   (0.9-1.6)  


 


Triglycerides   (0-200)  mg/dL


 


Cholesterol   ()  mg/dL


 


LDL Cholesterol, Calc   ()  mg/dL


 


VLDL Cholesterol   (5-55)  mg/dL


 


HDL Cholesterol   (40-60)  mg/dL


 


Cholesterol/HDL Ratio   (3.3-6.0)  











Med Orders - Current: 


 Current Medications





Docusate Sodium (Colace)  100 mg PO BID PRN


   PRN Reason: Constipation


Heparin Sodium (Porcine) (Heparin Sodium)  5,000 units SUBCUT Q8H [facility]


   Last Admin: 06/17/19 06:03 Dose:  5,000 units


Ceftriaxone Sodium/Dextrose 1 (gm/ Premix)  50 mls @ 100 mls/hr IV Q24H [facility]


   Last Admin: 06/16/19 09:09 Dose:  100 mls/hr


Ibuprofen (Motrin)  400 mg PO Q6H PRN


   PRN Reason: Pain (mild 1-3)


Insulin Aspart (Novolog)  0 unit SUBCUT TIDAC [facility]; Protocol


   Last Admin: 06/17/19 07:34 Dose:  3 units


Ondansetron HCl (Zofran Odt)  4 mg PO Q6H PRN


   PRN Reason: nausea, able to take PO


Sodium Chloride (Saline Flush)  10 ml FLUSH ASDIRECTED PRN


   PRN Reason: Keep Vein Open


   Last Admin: 06/15/19 12:12 Dose:  10 ml


Sodium Chloride (Saline Flush)  2.5 ml FLUSH ASDIRECTED PRN


   PRN Reason: Keep Vein Open


   Last Admin: 06/15/19 12:12 Dose:  2.5 ml


Temazepam (Restoril)  15 mg PO BEDTIME PRN


   PRN Reason: Sleep





Discontinued Medications





Haloperidol (Haldol)  5 mg PO ONETIME ONE


   Stop: 06/15/19 15:04


   Last Admin: 06/15/19 15:11 Dose:  5 mg


Sodium Chloride (Normal Saline)  1,000 mls @ 999 mls/hr IV .Bolus ONE


   Stop: 06/15/19 12:59


   Last Admin: 06/15/19 12:11 Dose:  999 mls/hr


Levofloxacin/Dextrose 500 mg/ (Premix)  100 mls @ 100 mls/hr IV ONETIME ONE


   Stop: 06/15/19 14:32


   Last Admin: 06/15/19 13:47 Dose:  100 mls/hr


Sodium Chloride (Normal Saline)  1,000 mls @ 75 mls/hr IV ASDIRECTED [facility]


   Last Admin: 06/17/19 06:41 Dose:  75 mls/hr


Insulin Aspart (Novolog)  0 unit SUBCUT ACBREAKFASTANDBED [facility]; Protocol


Oxycodone HCl (Oxycodone)  5 mg PO Q4H PRN


   PRN Reason: Pain (moderate 4-6)











- Exam


General: Alert, Oriented, Cooperative, No Acute Distress


Lungs: Clear to Auscultation, Normal Respiratory Effort


Cardiovascular: Regular Rate, Regular Rhythm


GI/Abdominal Exam: Normal Bowel Sounds, Soft, Non-Tender, No Organomegaly


Extremities: Normal Inspection, Normal Range of Motion, Non-Tender, No Pedal 

Edema


Neurological: No New Focal Deficit


Psy/Mental Status: Alert, Normal Affect, Normal Mood





- Problem List & Annotations


(1) UTI (urinary tract infection)


SNOMED Code(s): 24085114


   Code(s): N39.0 - URINARY TRACT INFECTION, SITE NOT SPECIFIED   Status: Acute

   Current Visit: Yes   


Qualifiers: 


   Urinary tract infection type: acute cystitis   Hematuria presence: without 

hematuria   Qualified Code(s): N30.00 - Acute cystitis without hematuria   





(2) Delayed emotional development


SNOMED Code(s): 709472968


   Code(s): F88 - OTHER DISORDERS OF PSYCHOLOGICAL DEVELOPMENT   Status: 

Chronic   Priority: High   Current Visit: Yes   





(3) Diabetes mellitus type 2 in obese


SNOMED Code(s): 32043654


   Code(s): E11.69 - TYPE 2 DIABETES MELLITUS WITH OTHER SPECIFIED COMPLICATION

; E66.9 - OBESITY, UNSPECIFIED   Status: Chronic   Priority: High   Current 

Visit: Yes   





- Problem List Review


Problem List Initiated/Reviewed/Updated: Yes





- Plan


Plan:: 





This 58 year old male admitted with AMS and UTI








1. UTI: UC returned with E coli, resistant to Ampicillin, continue therapy with 

Rocephin for now. Voiding well, no concerns. No Leukocytosis.





2. AMS: Resolved once UTI was treated.





3. DM Type 2: Novolog SSI. Holding Metformin and Victozia. BS stable.





4. PMH developmental delays: Hasn't been good with taking medications at home. 

Family considering placement. 





VTE prophylaxis: Heparin. 





Dispo: Pending possible placement.














<Cordell Asher - Last Filed: 06/17/19 11:56>





- General Info


Admission Dx/Problem (Free Text): 





I have seen and examined to patient independently of Kimberli Levy CNP. I have 

discussed the case for care of this patient with her. I have reviewed and 

approve of the plan of care as outlined by her. Please see orders. 





- Patient Data


Vitals - Most Recent: 


 Last Vital Signs











Temp  36.4 C   06/17/19 07:45


 


Pulse  78   06/17/19 07:45


 


Resp  16   06/17/19 07:45


 


BP  171/87 H  06/17/19 07:45


 


Pulse Ox  93 L  06/17/19 07:45











I&O - Last 24 Hours: 


 Intake & Output











 06/16/19 06/17/19 06/17/19





 22:59 06:59 14:59


 


Intake Total 2489 1959 410


 


Output Total 0 200 


 


Balance 2489 0219 410











Lab Results Last 24 Hours: 


 Laboratory Results - last 24 hr











  06/16/19 06/16/19 06/17/19 Range/Units





  12:42 16:42 05:51 


 


Sodium    137  (136-148)  mmol/L


 


Potassium    4.1  (3.5-5.1)  mmol/L


 


Chloride    104  ()  mmol/L


 


Carbon Dioxide    29.6  (21.0-32.0)  mmol/L


 


BUN    15  (7.0-18.0)  mg/dL


 


Creatinine    0.8  (0.8-1.3)  mg/dL


 


Est Cr Clr Drug Dosing    94.10  mL/min


 


Estimated GFR (MDRD)    > 60.0  ml/min


 


Glucose    183 H  ()  mg/dL


 


POC Glucose  217 H  256 H   ()  mg/dL


 


Calcium    8.6  (8.5-10.1)  mg/dL














  06/17/19 Range/Units





  06:05 


 


Sodium   (136-148)  mmol/L


 


Potassium   (3.5-5.1)  mmol/L


 


Chloride   ()  mmol/L


 


Carbon Dioxide   (21.0-32.0)  mmol/L


 


BUN   (7.0-18.0)  mg/dL


 


Creatinine   (0.8-1.3)  mg/dL


 


Est Cr Clr Drug Dosing   mL/min


 


Estimated GFR (MDRD)   ml/min


 


Glucose   ()  mg/dL


 


POC Glucose  174 H  ()  mg/dL


 


Calcium   (8.5-10.1)  mg/dL











Thomas Results Last 24 Hours: 


 Microbiology











 06/15/19 13:25 Urine Culture - Final





 Urine, Clean Catch    Escherichia Coli











Med Orders - Current: 


 Current Medications





Docusate Sodium (Colace)  100 mg PO BID PRN


   PRN Reason: Constipation


Heparin Sodium (Porcine) (Heparin Sodium)  5,000 units SUBCUT Q8H [facility]


   Last Admin: 06/17/19 06:03 Dose:  5,000 units


Ceftriaxone Sodium/Dextrose 1 (gm/ Premix)  50 mls @ 100 mls/hr IV Q24H [facility]


   Last Admin: 06/17/19 09:03 Dose:  100 mls/hr


Ibuprofen (Motrin)  400 mg PO Q6H PRN


   PRN Reason: Pain (mild 1-3)


Insulin Aspart (Novolog)  0 unit SUBCUT TIDAC [facility]; Protocol


   Last Admin: 06/17/19 07:34 Dose:  3 units


Ondansetron HCl (Zofran Odt)  4 mg PO Q6H PRN


   PRN Reason: nausea, able to take PO


Sodium Chloride (Saline Flush)  10 ml FLUSH ASDIRECTED PRN


   PRN Reason: Keep Vein Open


   Last Admin: 06/15/19 12:12 Dose:  10 ml


Sodium Chloride (Saline Flush)  2.5 ml FLUSH ASDIRECTED PRN


   PRN Reason: Keep Vein Open


   Last Admin: 06/15/19 12:12 Dose:  2.5 ml


Temazepam (Restoril)  15 mg PO BEDTIME PRN


   PRN Reason: Sleep





Discontinued Medications





Haloperidol (Haldol)  5 mg PO ONETIME ONE


   Stop: 06/15/19 15:04


   Last Admin: 06/15/19 15:11 Dose:  5 mg


Sodium Chloride (Normal Saline)  1,000 mls @ 999 mls/hr IV .Bolus ONE


   Stop: 06/15/19 12:59


   Last Admin: 06/15/19 12:11 Dose:  999 mls/hr


Levofloxacin/Dextrose 500 mg/ (Premix)  100 mls @ 100 mls/hr IV ONETIME ONE


   Stop: 06/15/19 14:32


   Last Admin: 06/15/19 13:47 Dose:  100 mls/hr


Sodium Chloride (Normal Saline)  1,000 mls @ 75 mls/hr IV ASDIRECTED [facility]


   Last Admin: 06/17/19 06:41 Dose:  75 mls/hr


Insulin Aspart (Novolog)  0 unit SUBCUT ACBREAKFASTANDBED [facility]; Protocol


Oxycodone HCl (Oxycodone)  5 mg PO Q4H PRN


   PRN Reason: Pain (moderate 4-6)











- Problem List & Annotations


(1) Altered mental status


SNOMED Code(s): 014872679


   Code(s): R41.82 - ALTERED MENTAL STATUS, UNSPECIFIED   Status: Acute   

Priority: High   Current Visit: Yes   


Qualifiers: 


   Altered mental status type: unspecified   Qualified Code(s): R41.82 - 

Altered mental status, unspecified   





(2) Delayed emotional development


SNOMED Code(s): 030694936


   Code(s): F88 - OTHER DISORDERS OF PSYCHOLOGICAL DEVELOPMENT   Status: 

Chronic   Priority: High   Current Visit: Yes   





(3) Diabetes mellitus type 2 in obese


SNOMED Code(s): 31087881


   Code(s): E11.69 - TYPE 2 DIABETES MELLITUS WITH OTHER SPECIFIED COMPLICATION

; E66.9 - OBESITY, UNSPECIFIED   Status: Chronic   Priority: High   Current 

Visit: Yes   





(4) Noncompliance


SNOMED Code(s): 1326727


   Code(s): Z91.19 - PATIENT'S NONCOMPLIANCE W OTH MEDICAL TREATMENT AND 

REGIMEN   Status: Chronic   Priority: Medium   Current Visit: Yes   





(5) Total self-care deficit


SNOMED Code(s): 43495247


   Code(s): R41.89 - OTH SYMPTOMS AND SIGNS W COGNITIVE FUNCTIONS AND AWARENESS

   Status: Chronic   Priority: High   Current Visit: Yes

## 2019-06-18 LAB
CHLORIDE SERPL-SCNC: 95 MMOL/L (ref 98–107)
HBA1C BLD-MCNC: 8 % (ref 4.5–6.2)
SODIUM SERPL-SCNC: 131 MMOL/L (ref 136–148)

## 2019-06-18 RX ADMIN — DEXTROSE SCH UNITS: 10 SOLUTION INTRAVENOUS at 14:26

## 2019-06-18 RX ADMIN — DEXTROSE SCH UNITS: 10 SOLUTION INTRAVENOUS at 21:28

## 2019-06-18 RX ADMIN — DEXTROSE SCH UNITS: 10 SOLUTION INTRAVENOUS at 06:01

## 2019-06-18 NOTE — PCM.PN
<Kimberli Levy M - Last Filed: 06/18/19 09:33>





- General Info


Date of Service: 06/18/19


Admission Dx/Problem (Free Text): 





AMS, UTI


Subjective Update: 





Alert this morning, not as talkative today. No complaints. No pain, no chest 

pain.


Functional Status: Reports: Pain Controlled, Tolerating Diet, Ambulating, 

Urinating





- Review of Systems


General: Reports: No Symptoms.  Denies: Weakness


Pulmonary: Reports: No Symptoms.  Denies: Shortness of Breath


Cardiovascular: Reports: No Symptoms.  Denies: Chest Pain


Gastrointestinal: Reports: No Symptoms.  Denies: Abdominal Pain, Nausea, 

Vomiting


Genitourinary: Reports: No Symptoms


Neurological: Reports: Confusion


Psychiatric: Reports: No Symptoms





- Patient Data


Vitals - Most Recent: 


 Last Vital Signs











Temp  97.1 F   06/18/19 07:40


 


Pulse  129 H  06/18/19 07:40


 


Resp  16   06/18/19 07:40


 


BP  189/127 H  06/18/19 07:40


 


Pulse Ox  95   06/18/19 07:40











Weight - Most Recent: 108.862 kg


I&O - Last 24 Hours: 


 Intake & Output











 06/17/19 06/18/19 06/18/19





 22:59 06:59 14:59


 


Intake Total 1274 840 120


 


Output Total 0  


 


Balance 1274 840 120











Lab Results Last 24 Hours: 


 Laboratory Results - last 24 hr











  06/17/19 06/17/19 06/17/19 Range/Units





  12:12 16:30 21:15 


 


WBC     (4.0-11.0)  K/uL


 


RBC     (4.50-5.90)  M/uL


 


Hgb     (13.0-17.0)  g/dL


 


Hct     (38.0-50.0)  %


 


MCV     (80.0-98.0)  fL


 


MCH     (27.0-32.0)  pg


 


MCHC     (31.0-37.0)  g/dL


 


RDW Std Deviation     (28.0-62.0)  fl


 


RDW Coeff of Arvind     (11.0-15.0)  %


 


Plt Count     (150-400)  K/uL


 


MPV     (7.40-12.00)  fL


 


Neut % (Auto)     (48.0-80.0)  %


 


Lymph % (Auto)     (16.0-40.0)  %


 


Mono % (Auto)     (0.0-15.0)  %


 


Eos % (Auto)     (0.0-7.0)  %


 


Baso % (Auto)     (0.0-1.5)  %


 


Neut # (Auto)     (1.4-5.7)  K/uL


 


Lymph # (Auto)     (0.6-2.4)  K/uL


 


Mono # (Auto)     (0.0-0.8)  K/uL


 


Eos # (Auto)     (0.0-0.7)  K/uL


 


Baso # (Auto)     (0.0-0.1)  K/uL


 


Nucleated RBC %     /100WBC


 


Nucleated RBCs #     K/uL


 


Sodium     (136-148)  mmol/L


 


Potassium     (3.5-5.1)  mmol/L


 


Chloride     ()  mmol/L


 


Carbon Dioxide     (21.0-32.0)  mmol/L


 


BUN     (7.0-18.0)  mg/dL


 


Creatinine     (0.8-1.3)  mg/dL


 


Est Cr Clr Drug Dosing     mL/min


 


Estimated GFR (MDRD)     ml/min


 


Glucose     ()  mg/dL


 


POC Glucose  159 H  217 H  114 H  ()  mg/dL


 


Calcium     (8.5-10.1)  mg/dL














  06/18/19 06/18/19 06/18/19 Range/Units





  04:54 04:54 06:03 


 


WBC  6.74    (4.0-11.0)  K/uL


 


RBC  4.60    (4.50-5.90)  M/uL


 


Hgb  14.0    (13.0-17.0)  g/dL


 


Hct  42.0    (38.0-50.0)  %


 


MCV  91.3    (80.0-98.0)  fL


 


MCH  30.4    (27.0-32.0)  pg


 


MCHC  33.3    (31.0-37.0)  g/dL


 


RDW Std Deviation  44.4    (28.0-62.0)  fl


 


RDW Coeff of Arvind  13    (11.0-15.0)  %


 


Plt Count  214    (150-400)  K/uL


 


MPV  11.00    (7.40-12.00)  fL


 


Neut % (Auto)  76.7    (48.0-80.0)  %


 


Lymph % (Auto)  14.7 L    (16.0-40.0)  %


 


Mono % (Auto)  7.4    (0.0-15.0)  %


 


Eos % (Auto)  0.9    (0.0-7.0)  %


 


Baso % (Auto)  0.3    (0.0-1.5)  %


 


Neut # (Auto)  5.2    (1.4-5.7)  K/uL


 


Lymph # (Auto)  1.0    (0.6-2.4)  K/uL


 


Mono # (Auto)  0.5    (0.0-0.8)  K/uL


 


Eos # (Auto)  0.1    (0.0-0.7)  K/uL


 


Baso # (Auto)  0.0    (0.0-0.1)  K/uL


 


Nucleated RBC %  0.0    /100WBC


 


Nucleated RBCs #  0    K/uL


 


Sodium   131 L   (136-148)  mmol/L


 


Potassium   3.5   (3.5-5.1)  mmol/L


 


Chloride   95 L   ()  mmol/L


 


Carbon Dioxide   24.6   (21.0-32.0)  mmol/L


 


BUN   13   (7.0-18.0)  mg/dL


 


Creatinine   0.8   (0.8-1.3)  mg/dL


 


Est Cr Clr Drug Dosing   94.10   mL/min


 


Estimated GFR (MDRD)   > 60.0   ml/min


 


Glucose   229 H   ()  mg/dL


 


POC Glucose    205 H  ()  mg/dL


 


Calcium   9.4   (8.5-10.1)  mg/dL











Thomas Results Last 24 Hours: 


 Microbiology











 06/15/19 13:25 Urine Culture - Final





 Urine, Clean Catch    Escherichia Coli











Med Orders - Current: 


 Current Medications





Docusate Sodium (Colace)  100 mg PO BID PRN


   PRN Reason: Constipation


Heparin Sodium (Porcine) (Heparin Sodium)  5,000 units SUBCUT Q8H [facility]


   Last Admin: 06/18/19 06:01 Dose:  5,000 units


Ceftriaxone Sodium/Dextrose 1 (gm/ Premix)  50 mls @ 100 mls/hr IV Q24H [facility]


   Last Admin: 06/17/19 09:03 Dose:  100 mls/hr


Ibuprofen (Motrin)  400 mg PO Q6H PRN


   PRN Reason: Pain (mild 1-3)


Insulin Aspart (Novolog)  0 unit SUBCUT TIDAC [facility]; Protocol


   Last Admin: 06/18/19 07:57 Dose:  6 units


Losartan Potassium (Cozaar)  100 mg PO DAILY [facility]


Ondansetron HCl (Zofran Odt)  4 mg PO Q6H PRN


   PRN Reason: nausea, able to take PO


Sodium Chloride (Saline Flush)  10 ml FLUSH ASDIRECTED PRN


   PRN Reason: Keep Vein Open


   Last Admin: 06/15/19 12:12 Dose:  10 ml


Sodium Chloride (Saline Flush)  2.5 ml FLUSH ASDIRECTED PRN


   PRN Reason: Keep Vein Open


   Last Admin: 06/15/19 12:12 Dose:  2.5 ml


Temazepam (Restoril)  15 mg PO BEDTIME PRN


   PRN Reason: Sleep





Discontinued Medications





Haloperidol (Haldol)  5 mg PO ONETIME ONE


   Stop: 06/15/19 15:04


   Last Admin: 06/15/19 15:11 Dose:  5 mg


Haloperidol (Haldol)  5 mg PO Q6H PRN


   PRN Reason: Agitation


   Last Admin: 06/17/19 23:38 Dose:  5 mg


Sodium Chloride (Normal Saline)  1,000 mls @ 999 mls/hr IV .Bolus ONE


   Stop: 06/15/19 12:59


   Last Admin: 06/15/19 12:11 Dose:  999 mls/hr


Levofloxacin/Dextrose 500 mg/ (Premix)  100 mls @ 100 mls/hr IV ONETIME ONE


   Stop: 06/15/19 14:32


   Last Admin: 06/15/19 13:47 Dose:  100 mls/hr


Sodium Chloride (Normal Saline)  1,000 mls @ 75 mls/hr IV ASDIRECTED [facility]


   Last Admin: 06/17/19 06:41 Dose:  75 mls/hr


Insulin Aspart (Novolog)  0 unit SUBCUT ACBREAKFASTANDBED [facility]; Protocol


Oxycodone HCl (Oxycodone)  5 mg PO Q4H PRN


   PRN Reason: Pain (moderate 4-6)











- Exam


General: Alert, Cooperative, No Acute Distress.  No: Oriented


Lungs: Clear to Auscultation, Normal Respiratory Effort


Cardiovascular: Regular Rate, Regular Rhythm


GI/Abdominal Exam: Normal Bowel Sounds, Soft, Non-Tender


Extremities: Normal Inspection, Normal Range of Motion, Non-Tender, Pedal Edema 

(+2 pitting edema to BLE)


Wound/Incisions: Other (abrasion to L elbow, scabbing intact, no s/s infection)


Neurological: No New Focal Deficit


Psy/Mental Status: Alert, Anxious (at times becomes anxious with staff)





- Problem List & Annotations


(1) UTI (urinary tract infection)


SNOMED Code(s): 59972565


   Code(s): N39.0 - URINARY TRACT INFECTION, SITE NOT SPECIFIED   Status: Acute

   Current Visit: Yes   


Qualifiers: 


   Urinary tract infection type: acute cystitis   Hematuria presence: without 

hematuria   Qualified Code(s): N30.00 - Acute cystitis without hematuria   





(2) Delayed emotional development


SNOMED Code(s): 330502687


   Code(s): F88 - OTHER DISORDERS OF PSYCHOLOGICAL DEVELOPMENT   Status: 

Chronic   Priority: High   Current Visit: Yes   





(3) Diabetes mellitus type 2 in obese


SNOMED Code(s): 21640322


   Code(s): E11.69 - TYPE 2 DIABETES MELLITUS WITH OTHER SPECIFIED COMPLICATION

; E66.9 - OBESITY, UNSPECIFIED   Status: Chronic   Priority: High   Current 

Visit: Yes   





- Problem List Review


Problem List Initiated/Reviewed/Updated: Yes





- My Orders


Last 24 Hours: 


My Active Orders





06/19/19 05:11


BMP [BASIC METABOLIC PANEL,BMP] [CHEM] AM 


CBC WITH AUTO DIFF [HEME] AM 














- Plan


Plan:: 





This 58 year old male admitted with AMS and UTI








1. UTI: IV out, will not restart. Change Rocephin to Cefdinir. Voiding well, no 

concerns. No Leukocytosis.





2. AMS: Alert this morning, did receive Haldol last night, so seems more 

withdrawn today, not as talkative. Will monitor. Haldol discontinued.





3. DM Type 2: Novolog SSI. Holding Metformin and Victozia. BS stable.





4. PMH developmental delays: Hasn't been good with taking medications at home. 

Family considering placement in [place]





VTE prophylaxis: Heparin. 





Dispo: Pending placement.














<Cordell Asher - Last Filed: 06/18/19 12:36>





- General Info


Admission Dx/Problem (Free Text): 





I have seen and examined to patient independently of Kimberli Levy CNP. I have 

discussed the case for care of this patient with her. I have reviewed and 

approve of the plan of care as outlined by her. Please see orders. Awaiting 

placement. 








- Patient Data


Vitals - Most Recent: 


 Last Vital Signs











Temp  36.1 C   06/18/19 11:40


 


Pulse  113 H  06/18/19 11:40


 


Resp  20   06/18/19 11:40


 


BP  155/99 H  06/18/19 11:40


 


Pulse Ox  95   06/18/19 11:40











I&O - Last 24 Hours: 


 Intake & Output











 06/17/19 06/18/19 06/18/19





 22:59 06:59 14:59


 


Intake Total 1274 840 360


 


Output Total 0  


 


Balance 1274 840 360











Lab Results Last 24 Hours: 


 Laboratory Results - last 24 hr











  06/17/19 06/17/19 06/18/19 Range/Units





  16:30 21:15 04:54 


 


WBC    6.74  (4.0-11.0)  K/uL


 


RBC    4.60  (4.50-5.90)  M/uL


 


Hgb    14.0  (13.0-17.0)  g/dL


 


Hct    42.0  (38.0-50.0)  %


 


MCV    91.3  (80.0-98.0)  fL


 


MCH    30.4  (27.0-32.0)  pg


 


MCHC    33.3  (31.0-37.0)  g/dL


 


RDW Std Deviation    44.4  (28.0-62.0)  fl


 


RDW Coeff of Arvind    13  (11.0-15.0)  %


 


Plt Count    214  (150-400)  K/uL


 


MPV    11.00  (7.40-12.00)  fL


 


Neut % (Auto)    76.7  (48.0-80.0)  %


 


Lymph % (Auto)    14.7 L  (16.0-40.0)  %


 


Mono % (Auto)    7.4  (0.0-15.0)  %


 


Eos % (Auto)    0.9  (0.0-7.0)  %


 


Baso % (Auto)    0.3  (0.0-1.5)  %


 


Neut # (Auto)    5.2  (1.4-5.7)  K/uL


 


Lymph # (Auto)    1.0  (0.6-2.4)  K/uL


 


Mono # (Auto)    0.5  (0.0-0.8)  K/uL


 


Eos # (Auto)    0.1  (0.0-0.7)  K/uL


 


Baso # (Auto)    0.0  (0.0-0.1)  K/uL


 


Nucleated RBC %    0.0  /100WBC


 


Nucleated RBCs #    0  K/uL


 


Sodium     (136-148)  mmol/L


 


Potassium     (3.5-5.1)  mmol/L


 


Chloride     ()  mmol/L


 


Carbon Dioxide     (21.0-32.0)  mmol/L


 


BUN     (7.0-18.0)  mg/dL


 


Creatinine     (0.8-1.3)  mg/dL


 


Est Cr Clr Drug Dosing     mL/min


 


Estimated GFR (MDRD)     ml/min


 


Glucose     ()  mg/dL


 


POC Glucose  217 H  114 H   ()  mg/dL


 


Calcium     (8.5-10.1)  mg/dL














  06/18/19 06/18/19 06/18/19 Range/Units





  04:54 06:03 11:29 


 


WBC     (4.0-11.0)  K/uL


 


RBC     (4.50-5.90)  M/uL


 


Hgb     (13.0-17.0)  g/dL


 


Hct     (38.0-50.0)  %


 


MCV     (80.0-98.0)  fL


 


MCH     (27.0-32.0)  pg


 


MCHC     (31.0-37.0)  g/dL


 


RDW Std Deviation     (28.0-62.0)  fl


 


RDW Coeff of Arvind     (11.0-15.0)  %


 


Plt Count     (150-400)  K/uL


 


MPV     (7.40-12.00)  fL


 


Neut % (Auto)     (48.0-80.0)  %


 


Lymph % (Auto)     (16.0-40.0)  %


 


Mono % (Auto)     (0.0-15.0)  %


 


Eos % (Auto)     (0.0-7.0)  %


 


Baso % (Auto)     (0.0-1.5)  %


 


Neut # (Auto)     (1.4-5.7)  K/uL


 


Lymph # (Auto)     (0.6-2.4)  K/uL


 


Mono # (Auto)     (0.0-0.8)  K/uL


 


Eos # (Auto)     (0.0-0.7)  K/uL


 


Baso # (Auto)     (0.0-0.1)  K/uL


 


Nucleated RBC %     /100WBC


 


Nucleated RBCs #     K/uL


 


Sodium  131 L    (136-148)  mmol/L


 


Potassium  3.5    (3.5-5.1)  mmol/L


 


Chloride  95 L    ()  mmol/L


 


Carbon Dioxide  24.6    (21.0-32.0)  mmol/L


 


BUN  13    (7.0-18.0)  mg/dL


 


Creatinine  0.8    (0.8-1.3)  mg/dL


 


Est Cr Clr Drug Dosing  94.10    mL/min


 


Estimated GFR (MDRD)  > 60.0    ml/min


 


Glucose  229 H    ()  mg/dL


 


POC Glucose   205 H  219 H  ()  mg/dL


 


Calcium  9.4    (8.5-10.1)  mg/dL











Thomas Results Last 24 Hours: 


 Microbiology











 06/15/19 13:25 Urine Culture - Final





 Urine, Clean Catch    Escherichia Coli











Med Orders - Current: 


 Current Medications





Cefdinir (Omnicef)  300 mg PO BID [facility]


   Last Admin: 06/18/19 08:38 Dose:  300 mg


Docusate Sodium (Colace)  100 mg PO BID PRN


   PRN Reason: Constipation


Heparin Sodium (Porcine) (Heparin Sodium)  5,000 units SUBCUT Q8H [facility]


   Last Admin: 06/18/19 06:01 Dose:  5,000 units


Ibuprofen (Motrin)  400 mg PO Q6H PRN


   PRN Reason: Pain (mild 1-3)


Indapamide (Indapamide)  2.5 mg PO DAILY [facility]


   Last Admin: 06/18/19 08:38 Dose:  2.5 mg


Insulin Aspart (Novolog)  0 unit SUBCUT TIDAC [facility]; Protocol


   Last Admin: 06/18/19 11:45 Dose:  6 units


Losartan Potassium (Cozaar)  100 mg PO DAILY [facility]


   Last Admin: 06/18/19 08:37 Dose:  100 mg


Ondansetron HCl (Zofran Odt)  4 mg PO Q6H PRN


   PRN Reason: nausea, able to take PO


Sodium Chloride (Saline Flush)  10 ml FLUSH ASDIRECTED PRN


   PRN Reason: Keep Vein Open


   Last Admin: 06/15/19 12:12 Dose:  10 ml


Sodium Chloride (Saline Flush)  2.5 ml FLUSH ASDIRECTED PRN


   PRN Reason: Keep Vein Open


   Last Admin: 06/15/19 12:12 Dose:  2.5 ml


Temazepam (Restoril)  15 mg PO BEDTIME PRN


   PRN Reason: Sleep





Discontinued Medications





Haloperidol (Haldol)  5 mg PO ONETIME ONE


   Stop: 06/15/19 15:04


   Last Admin: 06/15/19 15:11 Dose:  5 mg


Haloperidol (Haldol)  5 mg PO Q6H PRN


   PRN Reason: Agitation


   Last Admin: 06/17/19 23:38 Dose:  5 mg


Sodium Chloride (Normal Saline)  1,000 mls @ 999 mls/hr IV .Bolus ONE


   Stop: 06/15/19 12:59


   Last Admin: 06/15/19 12:11 Dose:  999 mls/hr


Levofloxacin/Dextrose 500 mg/ (Premix)  100 mls @ 100 mls/hr IV ONETIME ONE


   Stop: 06/15/19 14:32


   Last Admin: 06/15/19 13:47 Dose:  100 mls/hr


Sodium Chloride (Normal Saline)  1,000 mls @ 75 mls/hr IV ASDIRECTED [facility]


   Last Admin: 06/17/19 06:41 Dose:  75 mls/hr


Ceftriaxone Sodium/Dextrose 1 (gm/ Premix)  50 mls @ 100 mls/hr IV Q24H [facility]


   Last Admin: 06/17/19 09:03 Dose:  100 mls/hr


Insulin Aspart (Novolog)  0 unit SUBCUT ACBREAKFASTANDBED [facility]; Protocol


Oxycodone HCl (Oxycodone)  5 mg PO Q4H PRN


   PRN Reason: Pain (moderate 4-6)











- Problem List & Annotations


(1) Altered mental status


SNOMED Code(s): 043237686


   Code(s): R41.82 - ALTERED MENTAL STATUS, UNSPECIFIED   Status: Acute   

Priority: High   Current Visit: Yes   


Qualifiers: 


   Altered mental status type: unspecified   Qualified Code(s): R41.82 - 

Altered mental status, unspecified   





(2) Delayed emotional development


SNOMED Code(s): 537035654


   Code(s): F88 - OTHER DISORDERS OF PSYCHOLOGICAL DEVELOPMENT   Status: 

Chronic   Priority: High   Current Visit: Yes   





(3) Diabetes mellitus type 2 in obese


SNOMED Code(s): 21504823


   Code(s): E11.69 - TYPE 2 DIABETES MELLITUS WITH OTHER SPECIFIED COMPLICATION

; E66.9 - OBESITY, UNSPECIFIED   Status: Chronic   Priority: High   Current 

Visit: Yes   





(4) Noncompliance


SNOMED Code(s): 7627875


   Code(s): Z91.19 - PATIENT'S NONCOMPLIANCE W OTH MEDICAL TREATMENT AND 

REGIMEN   Status: Chronic   Priority: Medium   Current Visit: Yes   





(5) Total self-care deficit


SNOMED Code(s): 11627052


   Code(s): R41.89 - OTH SYMPTOMS AND SIGNS W COGNITIVE FUNCTIONS AND AWARENESS

   Status: Chronic   Priority: High   Current Visit: Yes   





- My Orders


Last 24 Hours: 


My Active Orders





06/18/19 07:36


Communication Order [RC] PER UNIT ROUTINE

## 2019-06-19 LAB
CHLORIDE SERPL-SCNC: 96 MMOL/L (ref 98–107)
SODIUM SERPL-SCNC: 134 MMOL/L (ref 136–148)

## 2019-06-19 RX ADMIN — DEXTROSE SCH UNITS: 10 SOLUTION INTRAVENOUS at 14:00

## 2019-06-19 RX ADMIN — DEXTROSE SCH UNITS: 10 SOLUTION INTRAVENOUS at 05:49

## 2019-06-19 RX ADMIN — DEXTROSE SCH UNITS: 10 SOLUTION INTRAVENOUS at 21:19

## 2019-06-19 NOTE — PCM.PN
<Kimberli Levy M - Last Filed: 06/19/19 09:45>





- General Info


Date of Service: 06/19/19


Admission Dx/Problem (Free Text): 





AMS, UTI





Subjective Update: 





Doing well this morning. He is alert and talkative. No complaints of pain. No 

chest pain or SOB. 


Functional Status: Reports: Pain Controlled, Tolerating Diet, Ambulating, 

Urinating





- Review of Systems


General: Reports: No Symptoms.  Denies: Weakness, Fatigue, Malaise


Pulmonary: Reports: No Symptoms.  Denies: Shortness of Breath


Cardiovascular: Reports: No Symptoms.  Denies: Chest Pain, Dyspnea on Exertion, 

Edema


Gastrointestinal: Reports: No Symptoms.  Denies: Abdominal Pain, Nausea, 

Vomiting


Genitourinary: Reports: No Symptoms


Musculoskeletal: Reports: No Symptoms


Skin: Reports: No Symptoms


Neurological: Reports: No Symptoms


Psychiatric: Reports: No Symptoms.  Denies: Confusion





- Patient Data


Vitals - Most Recent: 


 Last Vital Signs











Temp  98.2 F   06/19/19 07:30


 


Pulse  110 H  06/19/19 07:30


 


Resp  16   06/19/19 07:30


 


BP  154/99 H  06/19/19 07:30


 


Pulse Ox  95   06/19/19 07:30











Weight - Most Recent: 108.862 kg


I&O - Last 24 Hours: 


 Intake & Output











 06/18/19 06/19/19 06/19/19





 22:59 06:59 14:59


 


Intake Total 960 400 


 


Output Total 0  


 


Balance 960 400 











Lab Results Last 24 Hours: 


 Laboratory Results - last 24 hr











  06/16/19 06/18/19 06/18/19 Range/Units





  08:35 11:29 16:04 


 


WBC     (4.0-11.0)  K/uL


 


RBC     (4.50-5.90)  M/uL


 


Hgb     (13.0-17.0)  g/dL


 


Hct     (38.0-50.0)  %


 


MCV     (80.0-98.0)  fL


 


MCH     (27.0-32.0)  pg


 


MCHC     (31.0-37.0)  g/dL


 


RDW Std Deviation     (28.0-62.0)  fl


 


RDW Coeff of Arvind     (11.0-15.0)  %


 


Plt Count     (150-400)  K/uL


 


MPV     (7.40-12.00)  fL


 


Neut % (Auto)     (48.0-80.0)  %


 


Lymph % (Auto)     (16.0-40.0)  %


 


Mono % (Auto)     (0.0-15.0)  %


 


Eos % (Auto)     (0.0-7.0)  %


 


Baso % (Auto)     (0.0-1.5)  %


 


Neut # (Auto)     (1.4-5.7)  K/uL


 


Lymph # (Auto)     (0.6-2.4)  K/uL


 


Mono # (Auto)     (0.0-0.8)  K/uL


 


Eos # (Auto)     (0.0-0.7)  K/uL


 


Baso # (Auto)     (0.0-0.1)  K/uL


 


Nucleated RBC %     /100WBC


 


Nucleated RBCs #     K/uL


 


Sodium     (136-148)  mmol/L


 


Potassium     (3.5-5.1)  mmol/L


 


Chloride     ()  mmol/L


 


Carbon Dioxide     (21.0-32.0)  mmol/L


 


BUN     (7.0-18.0)  mg/dL


 


Creatinine     (0.8-1.3)  mg/dL


 


Est Cr Clr Drug Dosing     mL/min


 


Estimated GFR (MDRD)     ml/min


 


Glucose     ()  mg/dL


 


POC Glucose   219 H  232 H  ()  mg/dL


 


Hemoglobin A1c  8.0 H    (4.5-6.2)  %


 


Calcium     (8.5-10.1)  mg/dL














  06/18/19 06/19/19 06/19/19 Range/Units





  21:34 04:26 04:26 


 


WBC   6.81   (4.0-11.0)  K/uL


 


RBC   4.64   (4.50-5.90)  M/uL


 


Hgb   14.0   (13.0-17.0)  g/dL


 


Hct   43.2   (38.0-50.0)  %


 


MCV   93.1   (80.0-98.0)  fL


 


MCH   30.2   (27.0-32.0)  pg


 


MCHC   32.4   (31.0-37.0)  g/dL


 


RDW Std Deviation   47.2   (28.0-62.0)  fl


 


RDW Coeff of Arvind   14   (11.0-15.0)  %


 


Plt Count   245   (150-400)  K/uL


 


MPV   10.60   (7.40-12.00)  fL


 


Neut % (Auto)   62.4   (48.0-80.0)  %


 


Lymph % (Auto)   26.4   (16.0-40.0)  %


 


Mono % (Auto)   9.0   (0.0-15.0)  %


 


Eos % (Auto)   1.8   (0.0-7.0)  %


 


Baso % (Auto)   0.4   (0.0-1.5)  %


 


Neut # (Auto)   4.3   (1.4-5.7)  K/uL


 


Lymph # (Auto)   1.8   (0.6-2.4)  K/uL


 


Mono # (Auto)   0.6   (0.0-0.8)  K/uL


 


Eos # (Auto)   0.1   (0.0-0.7)  K/uL


 


Baso # (Auto)   0.0   (0.0-0.1)  K/uL


 


Nucleated RBC %   0.0   /100WBC


 


Nucleated RBCs #   0   K/uL


 


Sodium    134 L  (136-148)  mmol/L


 


Potassium    3.7  (3.5-5.1)  mmol/L


 


Chloride    96 L  ()  mmol/L


 


Carbon Dioxide    28.2  (21.0-32.0)  mmol/L


 


BUN    17  (7.0-18.0)  mg/dL


 


Creatinine    0.9  (0.8-1.3)  mg/dL


 


Est Cr Clr Drug Dosing    83.65  mL/min


 


Estimated GFR (MDRD)    > 60.0  ml/min


 


Glucose    192 H  ()  mg/dL


 


POC Glucose  183 H    ()  mg/dL


 


Hemoglobin A1c     (4.5-6.2)  %


 


Calcium    9.5  (8.5-10.1)  mg/dL














  06/19/19 Range/Units





  05:55 


 


WBC   (4.0-11.0)  K/uL


 


RBC   (4.50-5.90)  M/uL


 


Hgb   (13.0-17.0)  g/dL


 


Hct   (38.0-50.0)  %


 


MCV   (80.0-98.0)  fL


 


MCH   (27.0-32.0)  pg


 


MCHC   (31.0-37.0)  g/dL


 


RDW Std Deviation   (28.0-62.0)  fl


 


RDW Coeff of Arvind   (11.0-15.0)  %


 


Plt Count   (150-400)  K/uL


 


MPV   (7.40-12.00)  fL


 


Neut % (Auto)   (48.0-80.0)  %


 


Lymph % (Auto)   (16.0-40.0)  %


 


Mono % (Auto)   (0.0-15.0)  %


 


Eos % (Auto)   (0.0-7.0)  %


 


Baso % (Auto)   (0.0-1.5)  %


 


Neut # (Auto)   (1.4-5.7)  K/uL


 


Lymph # (Auto)   (0.6-2.4)  K/uL


 


Mono # (Auto)   (0.0-0.8)  K/uL


 


Eos # (Auto)   (0.0-0.7)  K/uL


 


Baso # (Auto)   (0.0-0.1)  K/uL


 


Nucleated RBC %   /100WBC


 


Nucleated RBCs #   K/uL


 


Sodium   (136-148)  mmol/L


 


Potassium   (3.5-5.1)  mmol/L


 


Chloride   ()  mmol/L


 


Carbon Dioxide   (21.0-32.0)  mmol/L


 


BUN   (7.0-18.0)  mg/dL


 


Creatinine   (0.8-1.3)  mg/dL


 


Est Cr Clr Drug Dosing   mL/min


 


Estimated GFR (MDRD)   ml/min


 


Glucose   ()  mg/dL


 


POC Glucose  203 H  ()  mg/dL


 


Hemoglobin A1c   (4.5-6.2)  %


 


Calcium   (8.5-10.1)  mg/dL











Med Orders - Current: 


 Current Medications





Cefdinir (Omnicef)  300 mg PO BID [facility]


   Last Admin: 06/18/19 21:29 Dose:  300 mg


Docusate Sodium (Colace)  100 mg PO BID PRN


   PRN Reason: Constipation


Heparin Sodium (Porcine) (Heparin Sodium)  5,000 units SUBCUT Q8H [facility]


   Last Admin: 06/19/19 05:49 Dose:  5,000 units


Ibuprofen (Motrin)  400 mg PO Q6H PRN


   PRN Reason: Pain (mild 1-3)


Indapamide (Indapamide)  2.5 mg PO DAILY [facility]


   Last Admin: 06/18/19 08:38 Dose:  2.5 mg


Insulin Aspart (Novolog)  0 unit SUBCUT TIDAC [facility]; Protocol


   Last Admin: 06/19/19 07:46 Dose:  6 units


Losartan Potassium (Cozaar)  100 mg PO DAILY [facility]


   Last Admin: 06/18/19 08:37 Dose:  100 mg


Ondansetron HCl (Zofran Odt)  4 mg PO Q6H PRN


   PRN Reason: nausea, able to take PO


Sodium Chloride (Saline Flush)  10 ml FLUSH ASDIRECTED PRN


   PRN Reason: Keep Vein Open


   Last Admin: 06/15/19 12:12 Dose:  10 ml


Sodium Chloride (Saline Flush)  2.5 ml FLUSH ASDIRECTED PRN


   PRN Reason: Keep Vein Open


   Last Admin: 06/15/19 12:12 Dose:  2.5 ml


Temazepam (Restoril)  15 mg PO BEDTIME PRN


   PRN Reason: Sleep


   Last Admin: 06/18/19 21:29 Dose:  15 mg





Discontinued Medications





Haloperidol (Haldol)  5 mg PO ONETIME ONE


   Stop: 06/15/19 15:04


   Last Admin: 06/15/19 15:11 Dose:  5 mg


Haloperidol (Haldol)  5 mg PO Q6H PRN


   PRN Reason: Agitation


   Last Admin: 06/17/19 23:38 Dose:  5 mg


Sodium Chloride (Normal Saline)  1,000 mls @ 999 mls/hr IV .Bolus ONE


   Stop: 06/15/19 12:59


   Last Admin: 06/15/19 12:11 Dose:  999 mls/hr


Levofloxacin/Dextrose 500 mg/ (Premix)  100 mls @ 100 mls/hr IV ONETIME ONE


   Stop: 06/15/19 14:32


   Last Admin: 06/15/19 13:47 Dose:  100 mls/hr


Sodium Chloride (Normal Saline)  1,000 mls @ 75 mls/hr IV ASDIRECTED [facility]


   Last Admin: 06/17/19 06:41 Dose:  75 mls/hr


Ceftriaxone Sodium/Dextrose 1 (gm/ Premix)  50 mls @ 100 mls/hr IV Q24H [facility]


   Last Admin: 06/17/19 09:03 Dose:  100 mls/hr


Insulin Aspart (Novolog)  0 unit SUBCUT ACBREAKFASTANDBED [facility]; Protocol


Oxycodone HCl (Oxycodone)  5 mg PO Q4H PRN


   PRN Reason: Pain (moderate 4-6)











- Exam


General: Alert, Oriented, Cooperative, No Acute Distress


Lungs: Clear to Auscultation, Normal Respiratory Effort


Cardiovascular: Regular Rate, Regular Rhythm


GI/Abdominal Exam: Normal Bowel Sounds, Soft, Non-Tender, No Mass


Extremities: Normal Inspection, Normal Range of Motion, Non-Tender, Pedal Edema 

(+2 non pitting edema BLE)


Neurological: No New Focal Deficit


Psy/Mental Status: Alert, Normal Affect, Normal Mood





- Problem List & Annotations


(1) UTI (urinary tract infection)


SNOMED Code(s): 38513601


   Code(s): N39.0 - URINARY TRACT INFECTION, SITE NOT SPECIFIED   Status: Acute

   Current Visit: Yes   


Qualifiers: 


   Urinary tract infection type: acute cystitis   Hematuria presence: without 

hematuria   Qualified Code(s): N30.00 - Acute cystitis without hematuria   





(2) Delayed emotional development


SNOMED Code(s): 355955824


   Code(s): F88 - OTHER DISORDERS OF PSYCHOLOGICAL DEVELOPMENT   Status: 

Chronic   Priority: High   Current Visit: Yes   





(3) Diabetes mellitus type 2 in obese


SNOMED Code(s): 94348318


   Code(s): E11.69 - TYPE 2 DIABETES MELLITUS WITH OTHER SPECIFIED COMPLICATION

; E66.9 - OBESITY, UNSPECIFIED   Status: Chronic   Priority: High   Current 

Visit: Yes   





- Problem List Review


Problem List Initiated/Reviewed/Updated: Yes





- My Orders


Last 24 Hours: 


My Active Orders





06/18/19 09:00


Cefdinir [Omnicef]   300 mg PO BID 


Indapamide   2.5 mg PO DAILY 














- Plan


Plan:: 





This 58 year old male admitted with AMS and UTI








1. UTI: Continue Cefdinir. Voiding well, no concerns. No Leukocytosis.





2. AMS: Resolved. Very alert this morning, oriented x 3. No longer needing 

sitter this morning.





3. DM Type 2: Novolog SSI. Holding Metformin and Victozia. BS stable.





4. PMH developmental delays: Hasn't been good with taking medications at home. 

Family considering placement in [place]





VTE prophylaxis: Heparin. 





Dispo: Pending placement.














<Cordell Asher - Last Filed: 06/19/19 10:21>





- General Info


Admission Dx/Problem (Free Text): 





I have seen and examined to patient independently of Kimberli Levy CNP. I have 

discussed the case for care of this patient with her. I have reviewed and 

approve of the plan of care as outlined by her. Please see orders. Awaiting 

placement. Pending placement.





- Patient Data


Vitals - Most Recent: 


 Last Vital Signs











Temp  36.8 C   06/19/19 07:30


 


Pulse  110 H  06/19/19 07:30


 


Resp  16   06/19/19 07:30


 


BP  154/99 H  06/19/19 09:24


 


Pulse Ox  95   06/19/19 07:30











I&O - Last 24 Hours: 


 Intake & Output











 06/18/19 06/19/19 06/19/19





 22:59 06:59 14:59


 


Intake Total 960 400 360


 


Output Total 0  


 


Balance 960 400 360











Lab Results Last 24 Hours: 


 Laboratory Results - last 24 hr











  06/16/19 06/18/19 06/18/19 Range/Units





  08:35 11:29 16:04 


 


WBC     (4.0-11.0)  K/uL


 


RBC     (4.50-5.90)  M/uL


 


Hgb     (13.0-17.0)  g/dL


 


Hct     (38.0-50.0)  %


 


MCV     (80.0-98.0)  fL


 


MCH     (27.0-32.0)  pg


 


MCHC     (31.0-37.0)  g/dL


 


RDW Std Deviation     (28.0-62.0)  fl


 


RDW Coeff of Arvind     (11.0-15.0)  %


 


Plt Count     (150-400)  K/uL


 


MPV     (7.40-12.00)  fL


 


Neut % (Auto)     (48.0-80.0)  %


 


Lymph % (Auto)     (16.0-40.0)  %


 


Mono % (Auto)     (0.0-15.0)  %


 


Eos % (Auto)     (0.0-7.0)  %


 


Baso % (Auto)     (0.0-1.5)  %


 


Neut # (Auto)     (1.4-5.7)  K/uL


 


Lymph # (Auto)     (0.6-2.4)  K/uL


 


Mono # (Auto)     (0.0-0.8)  K/uL


 


Eos # (Auto)     (0.0-0.7)  K/uL


 


Baso # (Auto)     (0.0-0.1)  K/uL


 


Nucleated RBC %     /100WBC


 


Nucleated RBCs #     K/uL


 


Sodium     (136-148)  mmol/L


 


Potassium     (3.5-5.1)  mmol/L


 


Chloride     ()  mmol/L


 


Carbon Dioxide     (21.0-32.0)  mmol/L


 


BUN     (7.0-18.0)  mg/dL


 


Creatinine     (0.8-1.3)  mg/dL


 


Est Cr Clr Drug Dosing     mL/min


 


Estimated GFR (MDRD)     ml/min


 


Glucose     ()  mg/dL


 


POC Glucose   219 H  232 H  ()  mg/dL


 


Hemoglobin A1c  8.0 H    (4.5-6.2)  %


 


Calcium     (8.5-10.1)  mg/dL














  06/18/19 06/19/19 06/19/19 Range/Units





  21:34 04:26 04:26 


 


WBC   6.81   (4.0-11.0)  K/uL


 


RBC   4.64   (4.50-5.90)  M/uL


 


Hgb   14.0   (13.0-17.0)  g/dL


 


Hct   43.2   (38.0-50.0)  %


 


MCV   93.1   (80.0-98.0)  fL


 


MCH   30.2   (27.0-32.0)  pg


 


MCHC   32.4   (31.0-37.0)  g/dL


 


RDW Std Deviation   47.2   (28.0-62.0)  fl


 


RDW Coeff of Arvind   14   (11.0-15.0)  %


 


Plt Count   245   (150-400)  K/uL


 


MPV   10.60   (7.40-12.00)  fL


 


Neut % (Auto)   62.4   (48.0-80.0)  %


 


Lymph % (Auto)   26.4   (16.0-40.0)  %


 


Mono % (Auto)   9.0   (0.0-15.0)  %


 


Eos % (Auto)   1.8   (0.0-7.0)  %


 


Baso % (Auto)   0.4   (0.0-1.5)  %


 


Neut # (Auto)   4.3   (1.4-5.7)  K/uL


 


Lymph # (Auto)   1.8   (0.6-2.4)  K/uL


 


Mono # (Auto)   0.6   (0.0-0.8)  K/uL


 


Eos # (Auto)   0.1   (0.0-0.7)  K/uL


 


Baso # (Auto)   0.0   (0.0-0.1)  K/uL


 


Nucleated RBC %   0.0   /100WBC


 


Nucleated RBCs #   0   K/uL


 


Sodium    134 L  (136-148)  mmol/L


 


Potassium    3.7  (3.5-5.1)  mmol/L


 


Chloride    96 L  ()  mmol/L


 


Carbon Dioxide    28.2  (21.0-32.0)  mmol/L


 


BUN    17  (7.0-18.0)  mg/dL


 


Creatinine    0.9  (0.8-1.3)  mg/dL


 


Est Cr Clr Drug Dosing    83.65  mL/min


 


Estimated GFR (MDRD)    > 60.0  ml/min


 


Glucose    192 H  ()  mg/dL


 


POC Glucose  183 H    ()  mg/dL


 


Hemoglobin A1c     (4.5-6.2)  %


 


Calcium    9.5  (8.5-10.1)  mg/dL














  06/19/19 Range/Units





  05:55 


 


WBC   (4.0-11.0)  K/uL


 


RBC   (4.50-5.90)  M/uL


 


Hgb   (13.0-17.0)  g/dL


 


Hct   (38.0-50.0)  %


 


MCV   (80.0-98.0)  fL


 


MCH   (27.0-32.0)  pg


 


MCHC   (31.0-37.0)  g/dL


 


RDW Std Deviation   (28.0-62.0)  fl


 


RDW Coeff of Arvind   (11.0-15.0)  %


 


Plt Count   (150-400)  K/uL


 


MPV   (7.40-12.00)  fL


 


Neut % (Auto)   (48.0-80.0)  %


 


Lymph % (Auto)   (16.0-40.0)  %


 


Mono % (Auto)   (0.0-15.0)  %


 


Eos % (Auto)   (0.0-7.0)  %


 


Baso % (Auto)   (0.0-1.5)  %


 


Neut # (Auto)   (1.4-5.7)  K/uL


 


Lymph # (Auto)   (0.6-2.4)  K/uL


 


Mono # (Auto)   (0.0-0.8)  K/uL


 


Eos # (Auto)   (0.0-0.7)  K/uL


 


Baso # (Auto)   (0.0-0.1)  K/uL


 


Nucleated RBC %   /100WBC


 


Nucleated RBCs #   K/uL


 


Sodium   (136-148)  mmol/L


 


Potassium   (3.5-5.1)  mmol/L


 


Chloride   ()  mmol/L


 


Carbon Dioxide   (21.0-32.0)  mmol/L


 


BUN   (7.0-18.0)  mg/dL


 


Creatinine   (0.8-1.3)  mg/dL


 


Est Cr Clr Drug Dosing   mL/min


 


Estimated GFR (MDRD)   ml/min


 


Glucose   ()  mg/dL


 


POC Glucose  203 H  ()  mg/dL


 


Hemoglobin A1c   (4.5-6.2)  %


 


Calcium   (8.5-10.1)  mg/dL











Med Orders - Current: 


 Current Medications





Cefdinir (Omnicef)  300 mg PO BID [facility]


   Last Admin: 06/19/19 09:25 Dose:  300 mg


Docusate Sodium (Colace)  100 mg PO BID PRN


   PRN Reason: Constipation


Heparin Sodium (Porcine) (Heparin Sodium)  5,000 units SUBCUT Q8H [facility]


   Last Admin: 06/19/19 05:49 Dose:  5,000 units


Ibuprofen (Motrin)  400 mg PO Q6H PRN


   PRN Reason: Pain (mild 1-3)


Indapamide (Indapamide)  2.5 mg PO DAILY [facility]


   Last Admin: 06/19/19 09:23 Dose:  2.5 mg


Insulin Aspart (Novolog)  0 unit SUBCUT TIDAC [facility]; Protocol


   Last Admin: 06/19/19 07:46 Dose:  6 units


Losartan Potassium (Cozaar)  100 mg PO DAILY [facility]


   Last Admin: 06/19/19 09:24 Dose:  100 mg


Ondansetron HCl (Zofran Odt)  4 mg PO Q6H PRN


   PRN Reason: nausea, able to take PO


Sodium Chloride (Saline Flush)  10 ml FLUSH ASDIRECTED PRN


   PRN Reason: Keep Vein Open


   Last Admin: 06/15/19 12:12 Dose:  10 ml


Sodium Chloride (Saline Flush)  2.5 ml FLUSH ASDIRECTED PRN


   PRN Reason: Keep Vein Open


   Last Admin: 06/15/19 12:12 Dose:  2.5 ml


Temazepam (Restoril)  15 mg PO BEDTIME PRN


   PRN Reason: Sleep


   Last Admin: 06/18/19 21:29 Dose:  15 mg





Discontinued Medications





Haloperidol (Haldol)  5 mg PO ONETIME ONE


   Stop: 06/15/19 15:04


   Last Admin: 06/15/19 15:11 Dose:  5 mg


Haloperidol (Haldol)  5 mg PO Q6H PRN


   PRN Reason: Agitation


   Last Admin: 06/17/19 23:38 Dose:  5 mg


Sodium Chloride (Normal Saline)  1,000 mls @ 999 mls/hr IV .Bolus ONE


   Stop: 06/15/19 12:59


   Last Admin: 06/15/19 12:11 Dose:  999 mls/hr


Levofloxacin/Dextrose 500 mg/ (Premix)  100 mls @ 100 mls/hr IV ONETIME ONE


   Stop: 06/15/19 14:32


   Last Admin: 06/15/19 13:47 Dose:  100 mls/hr


Sodium Chloride (Normal Saline)  1,000 mls @ 75 mls/hr IV ASDIRECTED [facility]


   Last Admin: 06/17/19 06:41 Dose:  75 mls/hr


Ceftriaxone Sodium/Dextrose 1 (gm/ Premix)  50 mls @ 100 mls/hr IV Q24H [facility]


   Last Admin: 06/17/19 09:03 Dose:  100 mls/hr


Insulin Aspart (Novolog)  0 unit SUBCUT ACBREAKFASTANDBED [facility]; Protocol


Oxycodone HCl (Oxycodone)  5 mg PO Q4H PRN


   PRN Reason: Pain (moderate 4-6)











- Problem List & Annotations


(1) Altered mental status


SNOMED Code(s): 509869167


   Code(s): R41.82 - ALTERED MENTAL STATUS, UNSPECIFIED   Status: Acute   

Priority: High   Current Visit: Yes   


Qualifiers: 


   Altered mental status type: unspecified   Qualified Code(s): R41.82 - 

Altered mental status, unspecified   





(2) Delayed emotional development


SNOMED Code(s): 386411620


   Code(s): F88 - OTHER DISORDERS OF PSYCHOLOGICAL DEVELOPMENT   Status: 

Chronic   Priority: High   Current Visit: Yes   





(3) Diabetes mellitus type 2 in obese


SNOMED Code(s): 81073746


   Code(s): E11.69 - TYPE 2 DIABETES MELLITUS WITH OTHER SPECIFIED COMPLICATION

; E66.9 - OBESITY, UNSPECIFIED   Status: Chronic   Priority: High   Current 

Visit: Yes   





(4) Noncompliance


SNOMED Code(s): 1204229


   Code(s): Z91.19 - PATIENT'S NONCOMPLIANCE W OTH MEDICAL TREATMENT AND 

REGIMEN   Status: Chronic   Priority: Medium   Current Visit: Yes   





(5) Total self-care deficit


SNOMED Code(s): 09996496


   Code(s): R41.89 - OTH SYMPTOMS AND SIGNS W COGNITIVE FUNCTIONS AND AWARENESS

   Status: Chronic   Priority: High   Current Visit: Yes

## 2019-06-20 LAB
CHLORIDE SERPL-SCNC: 95 MMOL/L (ref 98–107)
SODIUM SERPL-SCNC: 131 MMOL/L (ref 136–148)

## 2019-06-20 RX ADMIN — DEXTROSE SCH UNITS: 10 SOLUTION INTRAVENOUS at 05:25

## 2019-06-20 RX ADMIN — DEXTROSE SCH UNITS: 10 SOLUTION INTRAVENOUS at 22:05

## 2019-06-20 RX ADMIN — DEXTROSE SCH UNITS: 10 SOLUTION INTRAVENOUS at 15:34

## 2019-06-20 NOTE — PCM.PN
<Kimberli Levy M - Last Filed: 06/20/19 09:49>





- General Info


Date of Service: 06/20/19


Admission Dx/Problem (Free Text): 





UTI


Subjective Update: 





Feeling good this morning. No pain. No chest pain or SOB. Reports peeing and 

having BMs well.


Functional Status: Reports: Pain Controlled, Tolerating Diet, Ambulating, 

Urinating





- Review of Systems


General: Reports: No Symptoms.  Denies: Weakness, Malaise


HEENT: Reports: No Symptoms.  Denies: Headaches, Sore Throat


Pulmonary: Reports: No Symptoms.  Denies: Shortness of Breath


Cardiovascular: Reports: No Symptoms.  Denies: Chest Pain


Gastrointestinal: Reports: No Symptoms.  Denies: Abdominal Pain, Nausea, 

Vomiting


Genitourinary: Reports: No Symptoms


Musculoskeletal: Reports: No Symptoms


Skin: Reports: No Symptoms


Neurological: Reports: No Symptoms


Psychiatric: Reports: No Symptoms





- Patient Data


Vitals - Most Recent: 


 Last Vital Signs











Temp  97.6 F   06/20/19 07:29


 


Pulse  99   06/20/19 07:29


 


Resp  18   06/20/19 07:29


 


BP  135/96 H  06/20/19 07:29


 


Pulse Ox  94 L  06/20/19 07:29











Weight - Most Recent: 108.862 kg


I&O - Last 24 Hours: 


 Intake & Output











 06/19/19 06/20/19 06/20/19





 22:59 06:59 14:59


 


Intake Total 1250 1300 


 


Output Total 0  


 


Balance 1250 1300 











Lab Results Last 24 Hours: 


 Laboratory Results - last 24 hr











  06/19/19 06/19/19 06/20/19 Range/Units





  12:22 16:03 05:30 


 


WBC    7.04  (4.0-11.0)  K/uL


 


RBC    4.53  (4.50-5.90)  M/uL


 


Hgb    13.7  (13.0-17.0)  g/dL


 


Hct    41.9  (38.0-50.0)  %


 


MCV    92.5  (80.0-98.0)  fL


 


MCH    30.2  (27.0-32.0)  pg


 


MCHC    32.7  (31.0-37.0)  g/dL


 


RDW Std Deviation    46.5  (28.0-62.0)  fl


 


RDW Coeff of Arvind    14  (11.0-15.0)  %


 


Plt Count    233  (150-400)  K/uL


 


MPV    10.30  (7.40-12.00)  fL


 


Neut % (Auto)    63.7  (48.0-80.0)  %


 


Lymph % (Auto)    22.4  (16.0-40.0)  %


 


Mono % (Auto)    11.2  (0.0-15.0)  %


 


Eos % (Auto)    1.8  (0.0-7.0)  %


 


Baso % (Auto)    0.9  (0.0-1.5)  %


 


Neut # (Auto)    4.5  (1.4-5.7)  K/uL


 


Lymph # (Auto)    1.6  (0.6-2.4)  K/uL


 


Mono # (Auto)    0.8  (0.0-0.8)  K/uL


 


Eos # (Auto)    0.1  (0.0-0.7)  K/uL


 


Baso # (Auto)    0.1  (0.0-0.1)  K/uL


 


Nucleated RBC %    0.0  /100WBC


 


Nucleated RBCs #    0  K/uL


 


Sodium     (136-148)  mmol/L


 


Potassium     (3.5-5.1)  mmol/L


 


Chloride     ()  mmol/L


 


Carbon Dioxide     (21.0-32.0)  mmol/L


 


BUN     (7.0-18.0)  mg/dL


 


Creatinine     (0.8-1.3)  mg/dL


 


Est Cr Clr Drug Dosing     mL/min


 


Estimated GFR (MDRD)     ml/min


 


Glucose     ()  mg/dL


 


POC Glucose  200 H  175 H   ()  mg/dL


 


Calcium     (8.5-10.1)  mg/dL














  06/20/19 06/20/19 Range/Units





  05:30 06:28 


 


WBC    (4.0-11.0)  K/uL


 


RBC    (4.50-5.90)  M/uL


 


Hgb    (13.0-17.0)  g/dL


 


Hct    (38.0-50.0)  %


 


MCV    (80.0-98.0)  fL


 


MCH    (27.0-32.0)  pg


 


MCHC    (31.0-37.0)  g/dL


 


RDW Std Deviation    (28.0-62.0)  fl


 


RDW Coeff of Arvind    (11.0-15.0)  %


 


Plt Count    (150-400)  K/uL


 


MPV    (7.40-12.00)  fL


 


Neut % (Auto)    (48.0-80.0)  %


 


Lymph % (Auto)    (16.0-40.0)  %


 


Mono % (Auto)    (0.0-15.0)  %


 


Eos % (Auto)    (0.0-7.0)  %


 


Baso % (Auto)    (0.0-1.5)  %


 


Neut # (Auto)    (1.4-5.7)  K/uL


 


Lymph # (Auto)    (0.6-2.4)  K/uL


 


Mono # (Auto)    (0.0-0.8)  K/uL


 


Eos # (Auto)    (0.0-0.7)  K/uL


 


Baso # (Auto)    (0.0-0.1)  K/uL


 


Nucleated RBC %    /100WBC


 


Nucleated RBCs #    K/uL


 


Sodium  131 L   (136-148)  mmol/L


 


Potassium  3.6   (3.5-5.1)  mmol/L


 


Chloride  95 L   ()  mmol/L


 


Carbon Dioxide  28.3   (21.0-32.0)  mmol/L


 


BUN  19 H   (7.0-18.0)  mg/dL


 


Creatinine  0.9   (0.8-1.3)  mg/dL


 


Est Cr Clr Drug Dosing  83.65   mL/min


 


Estimated GFR (MDRD)  > 60.0   ml/min


 


Glucose  200 H   ()  mg/dL


 


POC Glucose   196 H  ()  mg/dL


 


Calcium  9.2   (8.5-10.1)  mg/dL











Med Orders - Current: 


 Current Medications





Cefdinir (Omnicef)  300 mg PO BID [facility]


   Last Admin: 06/19/19 21:06 Dose:  300 mg


Docusate Sodium (Colace)  100 mg PO BID PRN


   PRN Reason: Constipation


Heparin Sodium (Porcine) (Heparin Sodium)  5,000 units SUBCUT Q8H [facility]


   Last Admin: 06/20/19 05:25 Dose:  5,000 units


Ibuprofen (Motrin)  400 mg PO Q6H PRN


   PRN Reason: Pain (mild 1-3)


Indapamide (Indapamide)  2.5 mg PO DAILY [facility]


   Last Admin: 06/19/19 09:23 Dose:  2.5 mg


Insulin Aspart (Novolog)  0 unit SUBCUT TIDAC [facility]; Protocol


   Last Admin: 06/20/19 07:50 Dose:  3 units


Losartan Potassium (Cozaar)  100 mg PO DAILY [facility]


   Last Admin: 06/19/19 09:24 Dose:  100 mg


Ondansetron HCl (Zofran Odt)  4 mg PO Q6H PRN


   PRN Reason: nausea, able to take PO


Sodium Chloride (Saline Flush)  10 ml FLUSH ASDIRECTED PRN


   PRN Reason: Keep Vein Open


   Last Admin: 06/15/19 12:12 Dose:  10 ml


Sodium Chloride (Saline Flush)  2.5 ml FLUSH ASDIRECTED PRN


   PRN Reason: Keep Vein Open


   Last Admin: 06/15/19 12:12 Dose:  2.5 ml


Temazepam (Restoril)  15 mg PO BEDTIME PRN


   PRN Reason: Sleep


   Last Admin: 06/19/19 21:06 Dose:  15 mg





Discontinued Medications





Haloperidol (Haldol)  5 mg PO ONETIME ONE


   Stop: 06/15/19 15:04


   Last Admin: 06/15/19 15:11 Dose:  5 mg


Haloperidol (Haldol)  5 mg PO Q6H PRN


   PRN Reason: Agitation


   Last Admin: 06/17/19 23:38 Dose:  5 mg


Sodium Chloride (Normal Saline)  1,000 mls @ 999 mls/hr IV .Bolus ONE


   Stop: 06/15/19 12:59


   Last Admin: 06/15/19 12:11 Dose:  999 mls/hr


Levofloxacin/Dextrose 500 mg/ (Premix)  100 mls @ 100 mls/hr IV ONETIME ONE


   Stop: 06/15/19 14:32


   Last Admin: 06/15/19 13:47 Dose:  100 mls/hr


Sodium Chloride (Normal Saline)  1,000 mls @ 75 mls/hr IV ASDIRECTED [facility]


   Last Admin: 06/17/19 06:41 Dose:  75 mls/hr


Ceftriaxone Sodium/Dextrose 1 (gm/ Premix)  50 mls @ 100 mls/hr IV Q24H [facility]


   Last Admin: 06/17/19 09:03 Dose:  100 mls/hr


Insulin Aspart (Novolog)  0 unit SUBCUT ACBREAKFASTANDBED [facility]; Protocol


Oxycodone HCl (Oxycodone)  5 mg PO Q4H PRN


   PRN Reason: Pain (moderate 4-6)











- Exam


General: Alert, Oriented, Cooperative


Lungs: Clear to Auscultation, Normal Respiratory Effort


Cardiovascular: Regular Rate, Regular Rhythm


GI/Abdominal Exam: Normal Bowel Sounds, Soft, Non-Tender, No Organomegaly


Extremities: Normal Inspection, Normal Range of Motion, Non-Tender, No Pedal 

Edema


Neurological: No New Focal Deficit


Psy/Mental Status: Alert, Normal Affect, Normal Mood





- Problem List & Annotations


(1) UTI (urinary tract infection)


SNOMED Code(s): 21439279


   Code(s): N39.0 - URINARY TRACT INFECTION, SITE NOT SPECIFIED   Status: Acute

   Current Visit: Yes   


Qualifiers: 


   Urinary tract infection type: acute cystitis   Hematuria presence: without 

hematuria   Qualified Code(s): N30.00 - Acute cystitis without hematuria   





(2) Delayed emotional development


SNOMED Code(s): 634416988


   Code(s): F88 - OTHER DISORDERS OF PSYCHOLOGICAL DEVELOPMENT   Status: 

Chronic   Priority: High   Current Visit: Yes   





(3) Diabetes mellitus type 2 in obese


SNOMED Code(s): 51743500


   Code(s): E11.69 - TYPE 2 DIABETES MELLITUS WITH OTHER SPECIFIED COMPLICATION

; E66.9 - OBESITY, UNSPECIFIED   Status: Chronic   Priority: High   Current 

Visit: Yes   





- Problem List Review


Problem List Initiated/Reviewed/Updated: Yes





- Plan


Plan:: 





This 58 year old male admitted with AMS and UTI








1. UTI: Continue Cefdinir. Voiding well, no concerns. No Leukocytosis.





2. DM Type 2: Novolog SSI. Holding Metformin and Victozia. BS stable.





3. PMH developmental delays: Hasn't been good with taking medications at home. 

Family considering placement in [place]








 VTE prophylaxis: Heparin. 





Dispo: Pending placement and further screening for SNF placement.














<Cordell Asher - Last Filed: 06/20/19 12:15>





- General Info


Admission Dx/Problem (Free Text): 





I have seen and examined to patient independently of Kimberli Levy CNP. I have 

discussed the case for care of this patient with her. I have reviewed and 

approve of the plan of care as outlined by her. Please see orders. Awaiting 

placement. 








- Patient Data


Vitals - Most Recent: 


 Last Vital Signs











Temp  36.4 C   06/20/19 07:29


 


Pulse  107 H  06/20/19 07:30


 


Resp  16   06/20/19 07:30


 


BP  148/74 H  06/20/19 09:42


 


Pulse Ox  98   06/20/19 07:30











I&O - Last 24 Hours: 


 Intake & Output











 06/19/19 06/20/19 06/20/19





 22:59 06:59 14:59


 


Intake Total 1250 1300 


 


Output Total 0  


 


Balance 1250 1300 











Lab Results Last 24 Hours: 


 Laboratory Results - last 24 hr











  06/19/19 06/19/19 06/20/19 Range/Units





  12:22 16:03 05:30 


 


WBC    7.04  (4.0-11.0)  K/uL


 


RBC    4.53  (4.50-5.90)  M/uL


 


Hgb    13.7  (13.0-17.0)  g/dL


 


Hct    41.9  (38.0-50.0)  %


 


MCV    92.5  (80.0-98.0)  fL


 


MCH    30.2  (27.0-32.0)  pg


 


MCHC    32.7  (31.0-37.0)  g/dL


 


RDW Std Deviation    46.5  (28.0-62.0)  fl


 


RDW Coeff of Arvind    14  (11.0-15.0)  %


 


Plt Count    233  (150-400)  K/uL


 


MPV    10.30  (7.40-12.00)  fL


 


Neut % (Auto)    63.7  (48.0-80.0)  %


 


Lymph % (Auto)    22.4  (16.0-40.0)  %


 


Mono % (Auto)    11.2  (0.0-15.0)  %


 


Eos % (Auto)    1.8  (0.0-7.0)  %


 


Baso % (Auto)    0.9  (0.0-1.5)  %


 


Neut # (Auto)    4.5  (1.4-5.7)  K/uL


 


Lymph # (Auto)    1.6  (0.6-2.4)  K/uL


 


Mono # (Auto)    0.8  (0.0-0.8)  K/uL


 


Eos # (Auto)    0.1  (0.0-0.7)  K/uL


 


Baso # (Auto)    0.1  (0.0-0.1)  K/uL


 


Nucleated RBC %    0.0  /100WBC


 


Nucleated RBCs #    0  K/uL


 


Sodium     (136-148)  mmol/L


 


Potassium     (3.5-5.1)  mmol/L


 


Chloride     ()  mmol/L


 


Carbon Dioxide     (21.0-32.0)  mmol/L


 


BUN     (7.0-18.0)  mg/dL


 


Creatinine     (0.8-1.3)  mg/dL


 


Est Cr Clr Drug Dosing     mL/min


 


Estimated GFR (MDRD)     ml/min


 


Glucose     ()  mg/dL


 


POC Glucose  200 H  175 H   ()  mg/dL


 


Calcium     (8.5-10.1)  mg/dL














  06/20/19 06/20/19 06/20/19 Range/Units





  05:30 06:28 12:06 


 


WBC     (4.0-11.0)  K/uL


 


RBC     (4.50-5.90)  M/uL


 


Hgb     (13.0-17.0)  g/dL


 


Hct     (38.0-50.0)  %


 


MCV     (80.0-98.0)  fL


 


MCH     (27.0-32.0)  pg


 


MCHC     (31.0-37.0)  g/dL


 


RDW Std Deviation     (28.0-62.0)  fl


 


RDW Coeff of Arvind     (11.0-15.0)  %


 


Plt Count     (150-400)  K/uL


 


MPV     (7.40-12.00)  fL


 


Neut % (Auto)     (48.0-80.0)  %


 


Lymph % (Auto)     (16.0-40.0)  %


 


Mono % (Auto)     (0.0-15.0)  %


 


Eos % (Auto)     (0.0-7.0)  %


 


Baso % (Auto)     (0.0-1.5)  %


 


Neut # (Auto)     (1.4-5.7)  K/uL


 


Lymph # (Auto)     (0.6-2.4)  K/uL


 


Mono # (Auto)     (0.0-0.8)  K/uL


 


Eos # (Auto)     (0.0-0.7)  K/uL


 


Baso # (Auto)     (0.0-0.1)  K/uL


 


Nucleated RBC %     /100WBC


 


Nucleated RBCs #     K/uL


 


Sodium  131 L    (136-148)  mmol/L


 


Potassium  3.6    (3.5-5.1)  mmol/L


 


Chloride  95 L    ()  mmol/L


 


Carbon Dioxide  28.3    (21.0-32.0)  mmol/L


 


BUN  19 H    (7.0-18.0)  mg/dL


 


Creatinine  0.9    (0.8-1.3)  mg/dL


 


Est Cr Clr Drug Dosing  83.65    mL/min


 


Estimated GFR (MDRD)  > 60.0    ml/min


 


Glucose  200 H    ()  mg/dL


 


POC Glucose   196 H  235 H  ()  mg/dL


 


Calcium  9.2    (8.5-10.1)  mg/dL











Med Orders - Current: 


 Current Medications





Cefdinir (Omnicef)  300 mg PO BID [facility]


   Last Admin: 06/20/19 09:42 Dose:  300 mg


Docusate Sodium (Colace)  100 mg PO BID PRN


   PRN Reason: Constipation


Heparin Sodium (Porcine) (Heparin Sodium)  5,000 units SUBCUT Q8H [facility]


   Last Admin: 06/20/19 05:25 Dose:  5,000 units


Ibuprofen (Motrin)  400 mg PO Q6H PRN


   PRN Reason: Pain (mild 1-3)


Indapamide (Indapamide)  2.5 mg PO DAILY [facility]


   Last Admin: 06/20/19 09:41 Dose:  2.5 mg


Insulin Aspart (Novolog)  0 unit SUBCUT TIDAC [facility]; Protocol


   Last Admin: 06/20/19 12:09 Dose:  6 units


Losartan Potassium (Cozaar)  100 mg PO DAILY [facility]


   Last Admin: 06/20/19 09:42 Dose:  100 mg


Ondansetron HCl (Zofran Odt)  4 mg PO Q6H PRN


   PRN Reason: nausea, able to take PO


Sodium Chloride (Saline Flush)  10 ml FLUSH ASDIRECTED PRN


   PRN Reason: Keep Vein Open


   Last Admin: 06/15/19 12:12 Dose:  10 ml


Sodium Chloride (Saline Flush)  2.5 ml FLUSH ASDIRECTED PRN


   PRN Reason: Keep Vein Open


   Last Admin: 06/15/19 12:12 Dose:  2.5 ml


Temazepam (Restoril)  15 mg PO BEDTIME PRN


   PRN Reason: Sleep


   Last Admin: 06/19/19 21:06 Dose:  15 mg





Discontinued Medications





Haloperidol (Haldol)  5 mg PO ONETIME ONE


   Stop: 06/15/19 15:04


   Last Admin: 06/15/19 15:11 Dose:  5 mg


Haloperidol (Haldol)  5 mg PO Q6H PRN


   PRN Reason: Agitation


   Last Admin: 06/17/19 23:38 Dose:  5 mg


Sodium Chloride (Normal Saline)  1,000 mls @ 999 mls/hr IV .Bolus ONE


   Stop: 06/15/19 12:59


   Last Admin: 06/15/19 12:11 Dose:  999 mls/hr


Levofloxacin/Dextrose 500 mg/ (Premix)  100 mls @ 100 mls/hr IV ONETIME ONE


   Stop: 06/15/19 14:32


   Last Admin: 06/15/19 13:47 Dose:  100 mls/hr


Sodium Chloride (Normal Saline)  1,000 mls @ 75 mls/hr IV ASDIRECTED [facility]


   Last Admin: 06/17/19 06:41 Dose:  75 mls/hr


Ceftriaxone Sodium/Dextrose 1 (gm/ [facility])  50 mls @ 100 mls/hr IV Q24H [facility]


   Last Admin: 06/17/19 09:03 Dose:  100 mls/hr


Insulin Aspart (Novolog)  0 unit SUBCUT ACBREAKFASTANDBED [facility]; Protocol


Oxycodone HCl (Oxycodone)  5 mg PO Q4H PRN


   PRN Reason: Pain (moderate 4-6)











- Problem List & Annotations


(1) Altered mental status


SNOMED Code(s): 283357566


   Code(s): R41.82 - ALTERED MENTAL STATUS, UNSPECIFIED   Status: Acute   

Priority: High   Current Visit: Yes   


Qualifiers: 


   Altered mental status type: unspecified   Qualified Code(s): R41.82 - 

Altered mental status, unspecified   





(2) Delayed emotional development


SNOMED Code(s): 039848856


   Code(s): F88 - OTHER DISORDERS OF PSYCHOLOGICAL DEVELOPMENT   Status: 

Chronic   Priority: High   Current Visit: Yes   





(3) Diabetes mellitus type 2 in obese


SNOMED Code(s): 46698451


   Code(s): E11.69 - TYPE 2 DIABETES MELLITUS WITH OTHER SPECIFIED COMPLICATION

; E66.9 - OBESITY, UNSPECIFIED   Status: Chronic   Priority: High   Current 

Visit: Yes   





(4) Noncompliance


SNOMED Code(s): 4304960


   Code(s): Z91.19 - PATIENT'S NONCOMPLIANCE W OTH MEDICAL TREATMENT AND 

REGIMEN   Status: Chronic   Priority: Medium   Current Visit: Yes   





(5) Total self-care deficit


SNOMED Code(s): 74459249


   Code(s): R41.89 - OTH SYMPTOMS AND SIGNS W COGNITIVE FUNCTIONS AND AWARENESS

   Status: Chronic   Priority: High   Current Visit: Yes

## 2019-06-21 RX ADMIN — DEXTROSE SCH UNITS: 10 SOLUTION INTRAVENOUS at 21:51

## 2019-06-21 RX ADMIN — DEXTROSE SCH UNITS: 10 SOLUTION INTRAVENOUS at 06:26

## 2019-06-21 RX ADMIN — DEXTROSE SCH UNITS: 10 SOLUTION INTRAVENOUS at 13:35

## 2019-06-21 NOTE — PCM.PN
<Kimberli Levy M - Last Filed: 06/21/19 09:14>





- General Info


Date of Service: 06/21/19


Admission Dx/Problem (Free Text): 





UTI   








Subjective Update: 





Doing well this morning, no complaints. Up ambulating in room per self. 


Functional Status: Reports: Pain Controlled, Tolerating Diet, Ambulating, 

Urinating





- Review of Systems


General: Reports: No Symptoms.  Denies: Weakness, Malaise


HEENT: Reports: No Symptoms.  Denies: Headaches, Sore Throat


Pulmonary: Reports: No Symptoms.  Denies: Shortness of Breath


Cardiovascular: Reports: No Symptoms.  Denies: Chest Pain


Gastrointestinal: Reports: No Symptoms.  Denies: Abdominal Pain, Nausea, 

Vomiting


Genitourinary: Reports: No Symptoms.  Denies: Dysuria, Frequency, Burning


Musculoskeletal: Reports: No Symptoms


Skin: Reports: No Symptoms


Neurological: Reports: No Symptoms


Psychiatric: Reports: No Symptoms





- Patient Data


Vitals - Most Recent: 


 Last Vital Signs











Temp  96.1 F   06/21/19 07:42


 


Pulse  112 H  06/21/19 07:42


 


Resp  16   06/21/19 07:42


 


BP  144/107 H  06/21/19 07:42


 


Pulse Ox  97   06/21/19 07:42











Weight - Most Recent: 108.862 kg


I&O - Last 24 Hours: 


 Intake & Output











 06/20/19 06/21/19 06/21/19





 22:59 06:59 14:59


 


Intake Total 640 2160 


 


Balance 640 2160 











Lab Results Last 24 Hours: 


 Laboratory Results - last 24 hr











  06/20/19 06/20/19 06/20/19 Range/Units





  12:06 17:04 21:58 


 


POC Glucose  235 H  177 H  199 H  ()  mg/dL














  06/21/19 Range/Units





  06:53 


 


POC Glucose  203 H  ()  mg/dL











Med Orders - Current: 


 Current Medications





Cefdinir (Omnicef)  300 mg PO BID [facility]


   Last Admin: 06/20/19 20:48 Dose:  300 mg


Docusate Sodium (Colace)  100 mg PO BID PRN


   PRN Reason: Constipation


Heparin Sodium (Porcine) (Heparin Sodium)  5,000 units SUBCUT Q8H [facility]


   Last Admin: 06/21/19 06:26 Dose:  5,000 units


Ibuprofen (Motrin)  400 mg PO Q6H PRN


   PRN Reason: Pain (mild 1-3)


Indapamide (Indapamide)  2.5 mg PO DAILY [facility]


   Last Admin: 06/20/19 09:41 Dose:  2.5 mg


Insulin Aspart (Novolog)  0 unit SUBCUT TIDAC [facility]; Protocol


   Last Admin: 06/20/19 17:16 Dose:  3 units


Losartan Potassium (Cozaar)  100 mg PO DAILY [facility]


   Last Admin: 06/20/19 09:42 Dose:  100 mg


Ondansetron HCl (Zofran Odt)  4 mg PO Q6H PRN


   PRN Reason: nausea, able to take PO


Sodium Chloride (Saline Flush)  10 ml FLUSH ASDIRECTED PRN


   PRN Reason: Keep Vein Open


   Last Admin: 06/15/19 12:12 Dose:  10 ml


Sodium Chloride (Saline Flush)  2.5 ml FLUSH ASDIRECTED PRN


   PRN Reason: Keep Vein Open


   Last Admin: 06/15/19 12:12 Dose:  2.5 ml


Temazepam (Restoril)  15 mg PO BEDTIME PRN


   PRN Reason: Sleep


   Last Admin: 06/19/19 21:06 Dose:  15 mg





Discontinued Medications





Haloperidol (Haldol)  5 mg PO ONETIME ONE


   Stop: 06/15/19 15:04


   Last Admin: 06/15/19 15:11 Dose:  5 mg


Haloperidol (Haldol)  5 mg PO Q6H PRN


   PRN Reason: Agitation


   Last Admin: 06/17/19 23:38 Dose:  5 mg


Sodium Chloride (Normal Saline)  1,000 mls @ 999 mls/hr IV .Bolus ONE


   Stop: 06/15/19 12:59


   Last Admin: 06/15/19 12:11 Dose:  999 mls/hr


Levofloxacin/Dextrose 500 mg/ (Premix)  100 mls @ 100 mls/hr IV ONETIME ONE


   Stop: 06/15/19 14:32


   Last Admin: 06/15/19 13:47 Dose:  100 mls/hr


Sodium Chloride (Normal Saline)  1,000 mls @ 75 mls/hr IV ASDIRECTED [facility]


   Last Admin: 06/17/19 06:41 Dose:  75 mls/hr


Ceftriaxone Sodium/Dextrose 1 (gm/ Premix)  50 mls @ 100 mls/hr IV Q24H [facility]


   Last Admin: 06/17/19 09:03 Dose:  100 mls/hr


Insulin Aspart (Novolog)  0 unit SUBCUT ACBREAKFASTANDBED [facility]; Protocol


Oxycodone HCl (Oxycodone)  5 mg PO Q4H PRN


   PRN Reason: Pain (moderate 4-6)











- Exam


General: Alert, Oriented, Cooperative, No Acute Distress


Lungs: Clear to Auscultation, Normal Respiratory Effort


Cardiovascular: Regular Rate, Regular Rhythm


GI/Abdominal Exam: Normal Bowel Sounds, Soft, Non-Tender, No Organomegaly


Extremities: Normal Inspection, Normal Range of Motion, Non-Tender, Pedal Edema


Neurological: No New Focal Deficit


Psy/Mental Status: Alert, Normal Affect, Normal Mood





- Problem List & Annotations


(1) UTI (urinary tract infection)


SNOMED Code(s): 90607911


   Code(s): N39.0 - URINARY TRACT INFECTION, SITE NOT SPECIFIED   Status: Acute

   Current Visit: Yes   


Qualifiers: 


   Urinary tract infection type: acute cystitis   Hematuria presence: without 

hematuria   Qualified Code(s): N30.00 - Acute cystitis without hematuria   





(2) Delayed emotional development


SNOMED Code(s): 609359030


   Code(s): F88 - OTHER DISORDERS OF PSYCHOLOGICAL DEVELOPMENT   Status: 

Chronic   Priority: High   Current Visit: Yes   





(3) Diabetes mellitus type 2 in obese


SNOMED Code(s): 68906326


   Code(s): E11.69 - TYPE 2 DIABETES MELLITUS WITH OTHER SPECIFIED COMPLICATION

; E66.9 - OBESITY, UNSPECIFIED   Status: Chronic   Priority: High   Current 

Visit: Yes   





- Problem List Review


Problem List Initiated/Reviewed/Updated: Yes





- Plan


Plan:: 





This 58 year old male admitted with AMS and UTI








1. UTI: Continue Cefdinir. Voiding well, no concerns. No Leukocytosis.





2. DM Type 2: Novolog SSI. Holding Metformin and Victozia. BS stable.





3. PMH developmental delays: Hasn't been good with taking medications at home. 

Family considering placement in [place]








 VTE prophylaxis: Heparin. 





Dispo: Pending placement and further screening for SNF placement.














<Cordell Asher - Last Filed: 06/22/19 08:52>





- General Info


Admission Dx/Problem (Free Text): 





I have seen and examined to patient independently of Kimberli Levy CNP. I have 

discussed the case for care of this patient with her. I have reviewed and 

approve of the plan of care as outlined by her. Please see orders. Awaiting 

placement. 








- Patient Data


Vitals - Most Recent: 


 Last Vital Signs











Temp  35.9 C   06/22/19 07:49


 


Pulse  91   06/22/19 07:49


 


Resp  14   06/22/19 07:49


 


BP  149/89 H  06/22/19 08:39


 


Pulse Ox  97   06/22/19 07:49











I&O - Last 24 Hours: 


 Intake & Output











 06/21/19 06/22/19 06/22/19





 22:59 06:59 14:59


 


Intake Total 900 2200 


 


Output Total 0  


 


Balance 900 2200 











Lab Results Last 24 Hours: 


 Laboratory Results - last 24 hr











  06/21/19 06/21/19 06/22/19 Range/Units





  11:31 17:09 06:16 


 


POC Glucose  181 H  204 H  224 H  ()  mg/dL











Med Orders - Current: 


 Current Medications





Cefdinir (Omnicef)  300 mg PO BID [facility]


   Last Admin: 06/22/19 08:39 Dose:  300 mg


Docusate Sodium (Colace)  100 mg PO BID PRN


   PRN Reason: Constipation


Heparin Sodium (Porcine) (Heparin Sodium)  5,000 units SUBCUT Q8H [facility]


   Last Admin: 06/22/19 06:11 Dose:  5,000 units


Ibuprofen (Motrin)  400 mg PO Q6H PRN


   PRN Reason: Pain (mild 1-3)


Indapamide (Indapamide)  2.5 mg PO DAILY [facility]


   Last Admin: 06/22/19 08:40 Dose:  2.5 mg


Insulin Aspart (Novolog)  0 unit SUBCUT TIDAC [facility]; Protocol


   Last Admin: 06/22/19 08:41 Dose:  6 units


Losartan Potassium (Cozaar)  100 mg PO DAILY [facility]


   Last Admin: 06/22/19 08:39 Dose:  100 mg


Ondansetron HCl (Zofran Odt)  4 mg PO Q6H PRN


   PRN Reason: nausea, able to take PO


Sodium Chloride (Saline Flush)  10 ml FLUSH ASDIRECTED PRN


   PRN Reason: Keep Vein Open


   Last Admin: 06/15/19 12:12 Dose:  10 ml


Sodium Chloride (Saline Flush)  2.5 ml FLUSH ASDIRECTED PRN


   PRN Reason: Keep Vein Open


   Last Admin: 06/15/19 12:12 Dose:  2.5 ml


Temazepam (Restoril)  15 mg PO BEDTIME PRN


   PRN Reason: Sleep


   Last Admin: 06/19/19 21:06 Dose:  15 mg





Discontinued Medications





Haloperidol (Haldol)  5 mg PO ONETIME ONE


   Stop: 06/15/19 15:04


   Last Admin: 06/15/19 15:11 Dose:  5 mg


Haloperidol (Haldol)  5 mg PO Q6H PRN


   PRN Reason: Agitation


   Last Admin: 06/17/19 23:38 Dose:  5 mg


Sodium Chloride (Normal Saline)  1,000 mls @ 999 mls/hr IV .Bolus ONE


   Stop: 06/15/19 12:59


   Last Admin: 06/15/19 12:11 Dose:  999 mls/hr


Levofloxacin/Dextrose 500 mg/ (Premix)  100 mls @ 100 mls/hr IV ONETIME ONE


   Stop: 06/15/19 14:32


   Last Admin: 06/15/19 13:47 Dose:  100 mls/hr


Sodium Chloride (Normal Saline)  1,000 mls @ 75 mls/hr IV ASDIRECTED [facility]


   Last Admin: 06/17/19 06:41 Dose:  75 mls/hr


Ceftriaxone Sodium/Dextrose 1 (gm/ Premix)  50 mls @ 100 mls/hr IV Q24H [facility]


   Last Admin: 06/17/19 09:03 Dose:  100 mls/hr


Insulin Aspart (Novolog)  0 unit SUBCUT ACBREAKFASTANDBED [facility]; Protocol


Oxycodone HCl (Oxycodone)  5 mg PO Q4H PRN


   PRN Reason: Pain (moderate 4-6)











- Problem List & Annotations


(1) Altered mental status


SNOMED Code(s): 756916597


   Code(s): R41.82 - ALTERED MENTAL STATUS, UNSPECIFIED   Status: Acute   

Priority: High   Current Visit: Yes   


Qualifiers: 


   Altered mental status type: unspecified   Qualified Code(s): R41.82 - 

Altered mental status, unspecified   





(2) Delayed emotional development


SNOMED Code(s): 223981946


   Code(s): F88 - OTHER DISORDERS OF PSYCHOLOGICAL DEVELOPMENT   Status: 

Chronic   Priority: High   Current Visit: Yes   





(3) Diabetes mellitus type 2 in obese


SNOMED Code(s): 12969151


   Code(s): E11.69 - TYPE 2 DIABETES MELLITUS WITH OTHER SPECIFIED COMPLICATION

; E66.9 - OBESITY, UNSPECIFIED   Status: Chronic   Priority: High   Current 

Visit: Yes   





(4) Noncompliance


SNOMED Code(s): 4376479


   Code(s): Z91.19 - PATIENT'S NONCOMPLIANCE W OTH MEDICAL TREATMENT AND 

REGIMEN   Status: Chronic   Priority: Medium   Current Visit: Yes   





(5) Total self-care deficit


SNOMED Code(s): 67377027


   Code(s): R41.89 - OTH SYMPTOMS AND SIGNS W COGNITIVE FUNCTIONS AND AWARENESS

   Status: Chronic   Priority: High   Current Visit: Yes

## 2019-06-22 RX ADMIN — DEXTROSE SCH UNITS: 10 SOLUTION INTRAVENOUS at 22:54

## 2019-06-22 RX ADMIN — DEXTROSE SCH UNITS: 10 SOLUTION INTRAVENOUS at 06:11

## 2019-06-22 RX ADMIN — DEXTROSE SCH: 10 SOLUTION INTRAVENOUS at 15:07

## 2019-06-22 NOTE — PCM.PN
- General Info


Date of Service: 06/22/19


Admission Dx/Problem (Free Text): 


Admitted secondary to altered mental status as well as inability to care for 

self at home.


Subjective Update: 





The patient is a 58-year-old gentleman who had been admitted on Caroline 15, 2019 

secondary to altered mental status. The patient previously at home and had been 

exhibiting bizarre behavior and the family was wanting the patient to 

considered for [facility]. Last night the patient disappeared for 

approximately 1 hour and multiple resources were mobilized to find the patient. 

The patient had a wander alert bracelet. The patient was found in a nearby 

room. It's uncertain where the patient had been.


Functional Status: Reports: Pain Controlled





- Review of Systems


General: Reports: No Symptoms


HEENT: Reports: No Symptoms


Pulmonary: Reports: No Symptoms


Cardiovascular: Reports: No Symptoms


Gastrointestinal: Reports: No Symptoms


Genitourinary: Reports: No Symptoms


Musculoskeletal: Reports: No Symptoms


Skin: Reports: No Symptoms


Neurological: Reports: No Symptoms


Psychiatric: Reports: No Symptoms


Systems Review Comment:: 





Review of systems not reliable.





- Patient Data


Vitals - Most Recent: 


 Last Vital Signs











Temp  36.4 C   06/22/19 04:16


 


Pulse  112 H  06/22/19 04:16


 


Resp  20   06/22/19 04:16


 


BP  164/97 H  06/22/19 04:16


 


Pulse Ox  95   06/22/19 04:16











Weight - Most Recent: 108.862 kg


I&O - Last 24 Hours: 


 Intake & Output











 06/21/19 06/22/19 06/22/19





 22:59 06:59 14:59


 


Intake Total 900 2200 


 


Output Total 0  


 


Balance 900 2200 











Lab Results Last 24 Hours: 


 Laboratory Results - last 24 hr











  06/21/19 06/21/19 06/22/19 Range/Units





  11:31 17:09 06:16 


 


POC Glucose  181 H  204 H  224 H  ()  mg/dL











Med Orders - Current: 


 Current Medications





Cefdinir (Omnicef)  300 mg PO BID [facility]


   Last Admin: 06/21/19 20:18 Dose:  300 mg


Docusate Sodium (Colace)  100 mg PO BID PRN


   PRN Reason: Constipation


Heparin Sodium (Porcine) (Heparin Sodium)  5,000 units SUBCUT Q8H [facility]


   Last Admin: 06/22/19 06:11 Dose:  5,000 units


Ibuprofen (Motrin)  400 mg PO Q6H PRN


   PRN Reason: Pain (mild 1-3)


Indapamide (Indapamide)  2.5 mg PO DAILY [facility]


   Last Admin: 06/21/19 08:58 Dose:  2.5 mg


Insulin Aspart (Novolog)  0 unit SUBCUT TIDAC [facility]; Protocol


   Last Admin: 06/21/19 17:12 Dose:  6 units


Losartan Potassium (Cozaar)  100 mg PO DAILY [facility]


   Last Admin: 06/21/19 08:57 Dose:  100 mg


Ondansetron HCl (Zofran Odt)  4 mg PO Q6H PRN


   PRN Reason: nausea, able to take PO


Sodium Chloride (Saline Flush)  10 ml FLUSH ASDIRECTED PRN


   PRN Reason: Keep Vein Open


   Last Admin: 06/15/19 12:12 Dose:  10 ml


Sodium Chloride (Saline Flush)  2.5 ml FLUSH ASDIRECTED PRN


   PRN Reason: Keep Vein Open


   Last Admin: 06/15/19 12:12 Dose:  2.5 ml


Temazepam (Restoril)  15 mg PO BEDTIME PRN


   PRN Reason: Sleep


   Last Admin: 06/19/19 21:06 Dose:  15 mg





Discontinued Medications





Haloperidol (Haldol)  5 mg PO ONETIME ONE


   Stop: 06/15/19 15:04


   Last Admin: 06/15/19 15:11 Dose:  5 mg


Haloperidol (Haldol)  5 mg PO Q6H PRN


   PRN Reason: Agitation


   Last Admin: 06/17/19 23:38 Dose:  5 mg


Sodium Chloride (Normal Saline)  1,000 mls @ 999 mls/hr IV .Bolus ONE


   Stop: 06/15/19 12:59


   Last Admin: 06/15/19 12:11 Dose:  999 mls/hr


Levofloxacin/Dextrose 500 mg/ (Premix)  100 mls @ 100 mls/hr IV ONETIME ONE


   Stop: 06/15/19 14:32


   Last Admin: 06/15/19 13:47 Dose:  100 mls/hr


Sodium Chloride (Normal Saline)  1,000 mls @ 75 mls/hr IV ASDIRECTED [facility]


   Last Admin: 06/17/19 06:41 Dose:  75 mls/hr


Ceftriaxone Sodium/Dextrose 1 (gm/ Premix)  50 mls @ 100 mls/hr IV Q24H [facility]


   Last Admin: 06/17/19 09:03 Dose:  100 mls/hr


Insulin Aspart (Novolog)  0 unit SUBCUT ACBREAKFASTANDBED [facility]; Protocol


Oxycodone HCl (Oxycodone)  5 mg PO Q4H PRN


   PRN Reason: Pain (moderate 4-6)











- Exam


Quality Assessment: Supplemental Oxygen


General: Alert, Cooperative, No Acute Distress.  No: Oriented


HEENT: Pupils Equal, Pupils Reactive, EOMI, Mucous Membr. Moist/Pink (Dry)


Neck: Supple, Trachea Midline


Lungs: Clear to Auscultation, Normal Respiratory Effort


Cardiovascular: Regular Rate, Regular Rhythm


GI/Abdominal Exam: Normal Bowel Sounds, Soft, No Distention


Back Exam: Normal Inspection


Extremities: Normal Inspection, No Pedal Edema


Skin: Warm, Dry, Intact


Neurological: No New Focal Deficit, Normal Gait


Psy/Mental Status: Alert, Normal Affect, Normal Mood





- Problem List & Annotations


(1) Altered mental status


SNOMED Code(s): 107520329


   Code(s): R41.82 - ALTERED MENTAL STATUS, UNSPECIFIED   Status: Acute   

Priority: High   Current Visit: Yes   


Qualifiers: 


   Altered mental status type: unspecified   Qualified Code(s): R41.82 - 

Altered mental status, unspecified   





(2) Delayed emotional development


SNOMED Code(s): 326140026


   Code(s): F88 - OTHER DISORDERS OF PSYCHOLOGICAL DEVELOPMENT   Status: 

Chronic   Priority: High   Current Visit: Yes   





(3) Diabetes mellitus type 2 in obese


SNOMED Code(s): 62637630


   Code(s): E11.69 - TYPE 2 DIABETES MELLITUS WITH OTHER SPECIFIED COMPLICATION

; E66.9 - OBESITY, UNSPECIFIED   Status: Chronic   Priority: High   Current 

Visit: Yes   





(4) Noncompliance


SNOMED Code(s): 5659702


   Code(s): Z91.19 - PATIENT'S NONCOMPLIANCE W OTH MEDICAL TREATMENT AND 

REGIMEN   Status: Chronic   Priority: Medium   Current Visit: Yes   





(5) Total self-care deficit


SNOMED Code(s): 88656288


   Code(s): R41.89 - OTH SYMPTOMS AND SIGNS W COGNITIVE FUNCTIONS AND AWARENESS

   Status: Chronic   Priority: High   Current Visit: Yes   





- Problem List Review


Problem List Initiated/Reviewed/Updated: Yes





- Plan


Plan:: 





This 58 year old male admitted with AMS and UTI








1. UTI: Continue Cefdinir. Voiding well, no concerns. No Leukocytosis.





2. DM Type 2: Novolog SSI. Holding Metformin and Victozia. BS stable.





3. PMH developmental delays: Hasn't been good with taking medications at home. 

Family considering placement in [place]








 VTE prophylaxis: Heparin. 





Dispo: Pending placement and further screening for SNF placement.








The patient is a 58-year-old gentleman who is currently awaiting placement. The 

patient's UTIs essentially resolved. We'll continue on antibiotics. He is also 

a type II diabetic and he will be kept on appropriate ADA diet as well as Accu-

Cheks before meals and at bedtime. The patient will be continued on DVT 

prophylaxis. Continue to monitor closely to ensure patient does not elope.

## 2019-06-23 RX ADMIN — DEXTROSE SCH UNITS: 10 SOLUTION INTRAVENOUS at 21:15

## 2019-06-23 RX ADMIN — LORAZEPAM PRN MG: 2 INJECTION INTRAMUSCULAR; INTRAVENOUS at 09:12

## 2019-06-23 RX ADMIN — DEXTROSE SCH UNITS: 10 SOLUTION INTRAVENOUS at 05:33

## 2019-06-23 RX ADMIN — LORAZEPAM PRN MG: 2 INJECTION INTRAMUSCULAR; INTRAVENOUS at 23:55

## 2019-06-23 RX ADMIN — DEXTROSE SCH UNITS: 10 SOLUTION INTRAVENOUS at 13:58

## 2019-06-23 NOTE — PCM.PN
- General Info


Date of Service: 06/23/19


Admission Dx/Problem (Free Text): 


Admitted secondary to altered mental status as well as inability to care for 

self at home.


Subjective Update: 





The patient is a 58-year-old gentleman was admitted secondary to altered mental 

status as well as not being able to care for himself at home. Patient does have 

some developmental issues. The patient has had issues overnight with wandering, 

agitation, not taking oral medications and had required IM sedation with Haldol 

and Ativan yesterday. The patient is alert but disoriented.


Functional Status: Reports: Pain Controlled





- Review of Systems


General: Reports: No Symptoms


HEENT: Reports: No Symptoms


Pulmonary: Reports: No Symptoms


Cardiovascular: Reports: No Symptoms


Gastrointestinal: Reports: No Symptoms


Genitourinary: Reports: No Symptoms


Musculoskeletal: Reports: No Symptoms


Skin: Reports: No Symptoms


Neurological: Reports: No Symptoms


Psychiatric: Reports: No Symptoms


Systems Review Comment:: 





Not reliable





- Patient Data


Vitals - Most Recent: 


 Last Vital Signs











Temp  36.6 C   06/23/19 07:20


 


Pulse  98   06/23/19 07:20


 


Resp  18   06/22/19 20:53


 


BP  128/89   06/23/19 07:20


 


Pulse Ox  98   06/23/19 07:20











Weight - Most Recent: 108.862 kg


I&O - Last 24 Hours: 


 Intake & Output











 06/22/19 06/23/19 06/23/19





 22:59 06:59 14:59


 


Intake Total 1120 1180 


 


Output Total  1750 


 


Balance 1120 -570 











Lab Results Last 24 Hours: 


 Laboratory Results - last 24 hr











  06/22/19 06/22/19 06/23/19 Range/Units





  11:19 20:56 06:02 


 


POC Glucose  212 H  308 H  221 H  ()  mg/dL











Med Orders - Current: 


 Current Medications





Cefdinir (Omnicef)  300 mg PO BID [facility]


   Last Admin: 06/22/19 20:29 Dose:  300 mg


Docusate Sodium (Colace)  100 mg PO BID PRN


   PRN Reason: Constipation


Heparin Sodium (Porcine) (Heparin Sodium)  5,000 units SUBCUT Q8H [facility]


   Last Admin: 06/23/19 05:33 Dose:  5,000 units


Ibuprofen (Motrin)  400 mg PO Q6H PRN


   PRN Reason: Pain (mild 1-3)


Indapamide (Indapamide)  2.5 mg PO DAILY [facility]


   Last Admin: 06/22/19 08:40 Dose:  2.5 mg


Insulin Aspart (Novolog)  0 unit SUBCUT TIDAC [facility]; Protocol


   Last Admin: 06/22/19 18:26 Dose:  Not Given


Losartan Potassium (Cozaar)  100 mg PO DAILY [facility]


   Last Admin: 06/22/19 08:39 Dose:  100 mg


Ondansetron HCl (Zofran Odt)  4 mg PO Q6H PRN


   PRN Reason: nausea, able to take PO


Sodium Chloride (Saline Flush)  10 ml FLUSH ASDIRECTED PRN


   PRN Reason: Keep Vein Open


   Last Admin: 06/15/19 12:12 Dose:  10 ml


Sodium Chloride (Saline Flush)  2.5 ml FLUSH ASDIRECTED PRN


   PRN Reason: Keep Vein Open


   Last Admin: 06/15/19 12:12 Dose:  2.5 ml


Temazepam (Restoril)  15 mg PO BEDTIME PRN


   PRN Reason: Sleep


   Last Admin: 06/22/19 20:29 Dose:  15 mg





Discontinued Medications





Haloperidol (Haldol)  5 mg PO ONETIME ONE


   Stop: 06/15/19 15:04


   Last Admin: 06/15/19 15:11 Dose:  5 mg


Haloperidol (Haldol)  5 mg PO Q6H PRN


   PRN Reason: Agitation


   Last Admin: 06/17/19 23:38 Dose:  5 mg


Haloperidol (Haldol)  10 mg PO ONETIME ONE


   Stop: 06/22/19 13:53


   Last Admin: 06/22/19 14:22 Dose:  Not Given


Haloperidol Lactate (Haldol)  5 mg IM ONETIME ONE


   Stop: 06/22/19 14:17


   Last Admin: 06/22/19 14:22 Dose:  5 mg


Sodium Chloride (Normal Saline)  1,000 mls @ 999 mls/hr IV .Bolus ONE


   Stop: 06/15/19 12:59


   Last Admin: 06/15/19 12:11 Dose:  999 mls/hr


Levofloxacin/Dextrose 500 mg/ (Premix)  100 mls @ 100 mls/hr IV ONETIME ONE


   Stop: 06/15/19 14:32


   Last Admin: 06/15/19 13:47 Dose:  100 mls/hr


Sodium Chloride (Normal Saline)  1,000 mls @ 75 mls/hr IV ASDIRECTED [facility]


   Last Admin: 06/17/19 06:41 Dose:  75 mls/hr


Ceftriaxone Sodium/Dextrose 1 (gm/ Premix)  50 mls @ 100 mls/hr IV Q24H [facility]


   Last Admin: 06/17/19 09:03 Dose:  100 mls/hr


Insulin Aspart (Novolog)  0 unit SUBCUT ACBREAKFASTANDBED [facility]; Protocol


Lorazepam (Ativan)  2 mg IM ONETIME ONE


   Stop: 06/22/19 14:57


   Last Admin: 06/22/19 15:04 Dose:  2 mg


Oxycodone HCl (Oxycodone)  5 mg PO Q4H PRN


   PRN Reason: Pain (moderate 4-6)











- Exam


Quality Assessment: No: Supplemental Oxygen


General: Alert, Other (Agitated).  No: Oriented


HEENT: Pupils Equal, Pupils Reactive, EOMI


Neck: Supple, Trachea Midline


Lungs: Clear to Auscultation, Normal Respiratory Effort


Cardiovascular: Regular Rate, Regular Rhythm


GI/Abdominal Exam: Normal Bowel Sounds, Soft, No Distention


Back Exam: Normal Inspection, Full Range of Motion


Extremities: Normal Inspection, No Pedal Edema


Skin: Warm, Dry, Intact


Neurological: No New Focal Deficit, Normal Gait


Psy/Mental Status: Alert, Anxious, Agitated.  No: Normal Affect, Normal Mood





- Problem List & Annotations


(1) Altered mental status


SNOMED Code(s): 690846670


   Code(s): R41.82 - ALTERED MENTAL STATUS, UNSPECIFIED   Status: Acute   

Priority: High   Current Visit: Yes   


Qualifiers: 


   Altered mental status type: unspecified   Qualified Code(s): R41.82 - 

Altered mental status, unspecified   





(2) Delayed emotional development


SNOMED Code(s): 327938952


   Code(s): F88 - OTHER DISORDERS OF PSYCHOLOGICAL DEVELOPMENT   Status: 

Chronic   Priority: High   Current Visit: Yes   





(3) Diabetes mellitus type 2 in obese


SNOMED Code(s): 42845429


   Code(s): E11.69 - TYPE 2 DIABETES MELLITUS WITH OTHER SPECIFIED COMPLICATION

; E66.9 - OBESITY, UNSPECIFIED   Status: Chronic   Priority: High   Current 

Visit: Yes   





(4) Noncompliance


SNOMED Code(s): 8720926


   Code(s): Z91.19 - PATIENT'S NONCOMPLIANCE W OTH MEDICAL TREATMENT AND 

REGIMEN   Status: Chronic   Priority: Medium   Current Visit: Yes   





(5) Total self-care deficit


SNOMED Code(s): 59409233


   Code(s): R41.89 - OTH SYMPTOMS AND SIGNS W COGNITIVE FUNCTIONS AND AWARENESS

   Status: Chronic   Priority: High   Current Visit: Yes   





- Problem List Review


Problem List Initiated/Reviewed/Updated: Yes





- My Orders


Last 24 Hours: 


My Active Orders





06/22/19 21:00


Vital Signs [RC] Q12HR 














- Plan


Plan:: 








The patient is a 58-year-old gentleman who is currently awaiting placement. The 

patient's behavior has been good other than his agitation, tendency to wander 

and not taking his medications. I've ordered Ativan 2 mg IM every 6 hours as 

needed for agitation. The patient is currently on monitoring bracelet to 

prevent wandering away. The patient will be continued on DVT prophylaxis. 

Patient will also be kept on appropriate ADA diet. Currently awaiting placement.

## 2019-06-24 RX ADMIN — DEXTROSE SCH UNITS: 10 SOLUTION INTRAVENOUS at 21:01

## 2019-06-24 RX ADMIN — DEXTROSE SCH UNITS: 10 SOLUTION INTRAVENOUS at 14:22

## 2019-06-24 RX ADMIN — DEXTROSE SCH UNITS: 10 SOLUTION INTRAVENOUS at 06:46

## 2019-06-24 NOTE — PCM.PN
<Kimberli Levy M - Last Filed: 06/24/19 09:26>





- General Info


Date of Service: 06/24/19


Admission Dx/Problem (Free Text): 


Inability to care for self


Subjective Update: 





Sitting on the edge of the bed, eating breakfast. Sitter at bedside. No 

complaints this morning. Alert.


Functional Status: Reports: Pain Controlled, Tolerating Diet, Ambulating, 

Urinating





- Review of Systems


General: Reports: No Symptoms.  Denies: Fever, Weakness, Fatigue


HEENT: Reports: No Symptoms.  Denies: Headaches, Sore Throat


Pulmonary: Reports: No Symptoms.  Denies: Shortness of Breath


Cardiovascular: Reports: No Symptoms.  Denies: Chest Pain


Gastrointestinal: Reports: No Symptoms.  Denies: Abdominal Pain, Nausea, 

Vomiting


Skin: Reports: No Symptoms


Neurological: Reports: No Symptoms


Psychiatric: Reports: No Symptoms





- Patient Data


Vitals - Most Recent: 


 Last Vital Signs











Temp  96.6 F   06/24/19 07:18


 


Pulse  82   06/24/19 07:18


 


Resp  16   06/24/19 07:18


 


BP  124/78   06/24/19 07:18


 


Pulse Ox  95   06/24/19 07:18











Weight - Most Recent: 97.069 kg


I&O - Last 24 Hours: 


 Intake & Output











 06/23/19 06/24/19 06/24/19





 22:59 06:59 14:59


 


Intake Total 2700 3020 


 


Output Total 1470  


 


Balance 1230 3020 











Lab Results Last 24 Hours: 


 Laboratory Results - last 24 hr











  06/23/19 06/23/19 06/23/19 Range/Units





  11:03 16:16 21:13 


 


POC Glucose  206 H  256 H  221 H  ()  mg/dL











Med Orders - Current: 


 Current Medications





Cefdinir (Omnicef)  300 mg PO BID [facility]


   Last Admin: 06/23/19 20:55 Dose:  300 mg


Docusate Sodium (Colace)  100 mg PO BID PRN


   PRN Reason: Constipation


Heparin Sodium (Porcine) (Heparin Sodium)  5,000 units SUBCUT Q8H [facility]


   Last Admin: 06/24/19 06:46 Dose:  5,000 units


Ibuprofen (Motrin)  400 mg PO Q6H PRN


   PRN Reason: Pain (mild 1-3)


Indapamide (Indapamide)  2.5 mg PO DAILY [facility]


   Last Admin: 06/23/19 08:22 Dose:  2.5 mg


Insulin Aspart (Novolog)  0 unit SUBCUT TIDAC [facility]; Protocol


   Last Admin: 06/23/19 16:50 Dose:  9 units


Lorazepam (Ativan)  2 mg IM Q6H PRN


   PRN Reason: Agitation


   Last Admin: 06/23/19 23:55 Dose:  2 mg


Losartan Potassium (Cozaar)  100 mg PO DAILY [facility]


   Last Admin: 06/23/19 08:23 Dose:  100 mg


Ondansetron HCl (Zofran Odt)  4 mg PO Q6H PRN


   PRN Reason: nausea, able to take PO


Sodium Chloride (Saline Flush)  10 ml FLUSH ASDIRECTED PRN


   PRN Reason: Keep Vein Open


   Last Admin: 06/15/19 12:12 Dose:  10 ml


Sodium Chloride (Saline Flush)  2.5 ml FLUSH ASDIRECTED PRN


   PRN Reason: Keep Vein Open


   Last Admin: 06/15/19 12:12 Dose:  2.5 ml


Temazepam (Restoril)  15 mg PO BEDTIME PRN


   PRN Reason: Sleep


   Last Admin: 06/23/19 20:56 Dose:  15 mg





Discontinued Medications





Haloperidol (Haldol)  5 mg PO ONETIME ONE


   Stop: 06/15/19 15:04


   Last Admin: 06/15/19 15:11 Dose:  5 mg


Haloperidol (Haldol)  5 mg PO Q6H PRN


   PRN Reason: Agitation


   Last Admin: 06/17/19 23:38 Dose:  5 mg


Haloperidol (Haldol)  10 mg PO ONETIME ONE


   Stop: 06/22/19 13:53


   Last Admin: 06/22/19 14:22 Dose:  Not Given


Haloperidol Lactate (Haldol)  5 mg IM ONETIME ONE


   Stop: 06/22/19 14:17


   Last Admin: 06/22/19 14:22 Dose:  5 mg


Sodium Chloride (Normal Saline)  1,000 mls @ 999 mls/hr IV .Bolus ONE


   Stop: 06/15/19 12:59


   Last Admin: 06/15/19 12:11 Dose:  999 mls/hr


Levofloxacin/Dextrose 500 mg/ (Premix)  100 mls @ 100 mls/hr IV ONETIME ONE


   Stop: 06/15/19 14:32


   Last Admin: 06/15/19 13:47 Dose:  100 mls/hr


Sodium Chloride (Normal Saline)  1,000 mls @ 75 mls/hr IV ASDIRECTED [facility]


   Last Admin: 06/17/19 06:41 Dose:  75 mls/hr


Ceftriaxone Sodium/Dextrose 1 (gm/ Premix)  50 mls @ 100 mls/hr IV Q24H [facility]


   Last Admin: 06/17/19 09:03 Dose:  100 mls/hr


Insulin Aspart (Novolog)  0 unit SUBCUT ACBREAKFASTANDBED [facility]; Protocol


Lorazepam (Ativan)  2 mg IM ONETIME ONE


   Stop: 06/22/19 14:57


   Last Admin: 06/22/19 15:04 Dose:  2 mg


Oxycodone HCl (Oxycodone)  5 mg PO Q4H PRN


   PRN Reason: Pain (moderate 4-6)











- Exam


General: Alert, Oriented, Cooperative, No Acute Distress


Lungs: Clear to Auscultation, Normal Respiratory Effort


Cardiovascular: Regular Rate, Regular Rhythm


GI/Abdominal Exam: Normal Bowel Sounds, Soft, Non-Tender


Extremities: Normal Inspection, Normal Range of Motion, Pedal Edema (+1 edema, 

non pitting)


Neurological: No New Focal Deficit


Psy/Mental Status: Alert, Normal Affect, Normal Mood





- Problem List & Annotations


(1) UTI (urinary tract infection)


SNOMED Code(s): 65744642


   Code(s): N39.0 - URINARY TRACT INFECTION, SITE NOT SPECIFIED   Status: Acute

   Current Visit: Yes   


Qualifiers: 


   Urinary tract infection type: acute cystitis   Hematuria presence: without 

hematuria   Qualified Code(s): N30.00 - Acute cystitis without hematuria   





(2) Delayed emotional development


SNOMED Code(s): 996723238


   Code(s): F88 - OTHER DISORDERS OF PSYCHOLOGICAL DEVELOPMENT   Status: 

Chronic   Priority: High   Current Visit: Yes   





(3) Diabetes mellitus type 2 in obese


SNOMED Code(s): 36172643


   Code(s): E11.69 - TYPE 2 DIABETES MELLITUS WITH OTHER SPECIFIED COMPLICATION

; E66.9 - OBESITY, UNSPECIFIED   Status: Chronic   Priority: High   Current 

Visit: Yes   





- Problem List Review


Problem List Initiated/Reviewed/Updated: Yes





- Plan


Plan:: 


This 58 year old male admitted with AMS and UTI





1. UTI: Will discontinued Cefdinir, treatment course complete.





2. DM Type 2: Novolog SSI. Holding Metformin and Victozia. BS stable.





3. PMH developmental delays: Has been wandering and not taking oral 

medications. Ativan IM started over the weekend. Looking at further evaluation 

of mental health. Victorino, social work assisting with possible placement. 





 VTE prophylaxis: Heparin. 





Dispo: Pending placement and further screening for SNF placement.








<Cordell Asher - Last Filed: 06/24/19 10:09>





- General Info


Admission Dx/Problem (Free Text): 





I have seen and examined to patient independently of Kimberli Levy CNP. I have 

discussed the case for care of this patient with her. I have reviewed and 

approve of the plan of care as outlined by her. Please see orders. Awaiting 

placement. 








- Patient Data


Vitals - Most Recent: 


 Last Vital Signs











Temp  35.9 C   06/24/19 07:18


 


Pulse  82   06/24/19 07:18


 


Resp  16   06/24/19 07:18


 


BP  124/78   06/24/19 09:00


 


Pulse Ox  95   06/24/19 07:18











I&O - Last 24 Hours: 


 Intake & Output











 06/23/19 06/24/19 06/24/19





 22:59 06:59 14:59


 


Intake Total 2700 3020 


 


Output Total 1470  


 


Balance 1230 3020 











Lab Results Last 24 Hours: 


 Laboratory Results - last 24 hr











  06/23/19 06/23/19 06/23/19 Range/Units





  11:03 16:16 21:13 


 


POC Glucose  206 H  256 H  221 H  ()  mg/dL











Med Orders - Current: 


 Current Medications





Docusate Sodium (Colace)  100 mg PO BID PRN


   PRN Reason: Constipation


Heparin Sodium (Porcine) (Heparin Sodium)  5,000 units SUBCUT Q8H [facility]


   Last Admin: 06/24/19 06:46 Dose:  5,000 units


Ibuprofen (Motrin)  400 mg PO Q6H PRN


   PRN Reason: Pain (mild 1-3)


Indapamide (Indapamide)  2.5 mg PO DAILY [facility]


   Last Admin: 06/24/19 09:00 Dose:  2.5 mg


Insulin Aspart (Novolog)  0 unit SUBCUT TIDAC [facility]; Protocol


   Last Admin: 06/24/19 08:59 Dose:  6 units


Lorazepam (Ativan)  2 mg IM Q6H PRN


   PRN Reason: Agitation


   Last Admin: 06/23/19 23:55 Dose:  2 mg


Losartan Potassium (Cozaar)  100 mg PO DAILY [facility]


   Last Admin: 06/24/19 09:00 Dose:  100 mg


Ondansetron HCl (Zofran Odt)  4 mg PO Q6H PRN


   PRN Reason: nausea, able to take PO


Sodium Chloride (Saline Flush)  10 ml FLUSH ASDIRECTED PRN


   PRN Reason: Keep Vein Open


   Last Admin: 06/15/19 12:12 Dose:  10 ml


Sodium Chloride (Saline Flush)  2.5 ml FLUSH ASDIRECTED PRN


   PRN Reason: Keep Vein Open


   Last Admin: 06/15/19 12:12 Dose:  2.5 ml


Temazepam (Restoril)  15 mg PO BEDTIME PRN


   PRN Reason: Sleep


   Last Admin: 06/23/19 20:56 Dose:  15 mg





Discontinued Medications





Cefdinir (Omnicef)  300 mg PO BID [facility]


   Last Admin: 06/24/19 09:01 Dose:  300 mg


Haloperidol (Haldol)  5 mg PO ONETIME ONE


   Stop: 06/15/19 15:04


   Last Admin: 06/15/19 15:11 Dose:  5 mg


Haloperidol (Haldol)  5 mg PO Q6H PRN


   PRN Reason: Agitation


   Last Admin: 06/17/19 23:38 Dose:  5 mg


Haloperidol (Haldol)  10 mg PO ONETIME ONE


   Stop: 06/22/19 13:53


   Last Admin: 06/22/19 14:22 Dose:  Not Given


Haloperidol Lactate (Haldol)  5 mg IM ONETIME ONE


   Stop: 06/22/19 14:17


   Last Admin: 06/22/19 14:22 Dose:  5 mg


Sodium Chloride (Normal Saline)  1,000 mls @ 999 mls/hr IV .Bolus ONE


   Stop: 06/15/19 12:59


   Last Admin: 06/15/19 12:11 Dose:  999 mls/hr


Levofloxacin/Dextrose 500 mg/ (Premix)  100 mls @ 100 mls/hr IV ONETIME ONE


   Stop: 06/15/19 14:32


   Last Admin: 06/15/19 13:47 Dose:  100 mls/hr


Sodium Chloride (Normal Saline)  1,000 mls @ 75 mls/hr IV ASDIRECTED [facility]


   Last Admin: 06/17/19 06:41 Dose:  75 mls/hr


Ceftriaxone Sodium/Dextrose 1 (gm/ Premix)  50 mls @ 100 mls/hr IV Q24H [facility]


   Last Admin: 06/17/19 09:03 Dose:  100 mls/hr


Insulin Aspart (Novolog)  0 unit SUBCUT ACBREAKFASTANDBED [facility]; Protocol


Lorazepam (Ativan)  2 mg IM ONETIME ONE


   Stop: 06/22/19 14:57


   Last Admin: 06/22/19 15:04 Dose:  2 mg


Oxycodone HCl (Oxycodone)  5 mg PO Q4H PRN


   PRN Reason: Pain (moderate 4-6)











- Problem List & Annotations


(1) Altered mental status


SNOMED Code(s): 238351222


   Code(s): R41.82 - ALTERED MENTAL STATUS, UNSPECIFIED   Status: Acute   

Priority: High   Current Visit: Yes   


Qualifiers: 


   Altered mental status type: unspecified   Qualified Code(s): R41.82 - 

Altered mental status, unspecified   





(2) Delayed emotional development


SNOMED Code(s): 182518174


   Code(s): F88 - OTHER DISORDERS OF PSYCHOLOGICAL DEVELOPMENT   Status: 

Chronic   Priority: High   Current Visit: Yes   





(3) Diabetes mellitus type 2 in obese


SNOMED Code(s): 26789456


   Code(s): E11.69 - TYPE 2 DIABETES MELLITUS WITH OTHER SPECIFIED COMPLICATION

; E66.9 - OBESITY, UNSPECIFIED   Status: Chronic   Priority: High   Current 

Visit: Yes   





(4) Noncompliance


SNOMED Code(s): 0133507


   Code(s): Z91.19 - PATIENT'S NONCOMPLIANCE W OTH MEDICAL TREATMENT AND 

REGIMEN   Status: Chronic   Priority: Medium   Current Visit: Yes   





(5) Total self-care deficit


SNOMED Code(s): 94376150


   Code(s): R41.89 - OTH SYMPTOMS AND SIGNS W COGNITIVE FUNCTIONS AND AWARENESS

   Status: Chronic   Priority: High   Current Visit: Yes

## 2019-06-24 NOTE — PCM.SN
<Kimberli Levy M - Last Filed: 06/24/19 11:50>





- Free Text/Narrative


Note: 





Spoke with Dr Amy MD in acute psychiatric unit at [facility]. She 

feels this is more acute delirium vs psychiatric concern and did not accept 

patient for transfer. [facility] as no beds available and the same 

with [facility] in [place]. Updated Victorino, Social work, who will 

reach out to  Human Services, who recommended inpatient psychiatric 

evaluation.





<Cordell Asher M - Last Filed: 06/24/19 17:21>





- Free Text/Narrative


Note: 





I have seen and examined to patient independently of Kimberli Levy CNP. I have 

discussed the case for care of this patient with her. I have reviewed and 

approve of the plan of care as outlined by her. Please see orders. Awaiting 

placement.

## 2019-06-25 LAB
CHLORIDE SERPL-SCNC: 97 MMOL/L (ref 98–107)
SODIUM SERPL-SCNC: 134 MMOL/L (ref 136–148)

## 2019-06-25 RX ADMIN — DEXTROSE SCH UNITS: 10 SOLUTION INTRAVENOUS at 06:02

## 2019-06-25 RX ADMIN — DEXTROSE SCH UNITS: 10 SOLUTION INTRAVENOUS at 21:37

## 2019-06-25 RX ADMIN — DEXTROSE SCH UNITS: 10 SOLUTION INTRAVENOUS at 13:53

## 2019-06-25 NOTE — PCM.PN
- General Info


Date of Service: 06/25/19


Admission Dx/Problem (Free Text): 








Self care deficit 





Subjective Update: 





Lying in bed Sitter at bedside. No complaints this morning. Alert.


Functional Status: Reports: Pain Controlled, Tolerating Diet, Ambulating, 

Urinating





- Review of Systems


General: Reports: No Symptoms.  Denies: Weakness, Fatigue


HEENT: Reports: No Symptoms.  Denies: Headaches, Sore Throat


Pulmonary: Reports: No Symptoms.  Denies: Shortness of Breath


Cardiovascular: Reports: No Symptoms.  Denies: Chest Pain


Gastrointestinal: Reports: No Symptoms.  Denies: Abdominal Pain, Nausea, 

Vomiting


Skin: Reports: No Symptoms


Neurological: Reports: No Symptoms


Psychiatric: Reports: Mood Lability.  Denies: Agitation, Hallucinations, 

Suicidal Ideation, Homicidal Ideation





- Patient Data


Vitals - Most Recent: 


 Last Vital Signs











Temp  97.2 F   06/25/19 07:16


 


Pulse  76   06/25/19 07:16


 


Resp  18   06/25/19 07:16


 


BP  120/69   06/25/19 08:56


 


Pulse Ox  93 L  06/25/19 07:16











Weight - Most Recent: 97.069 kg


I&O - Last 24 Hours: 


 Intake & Output











 06/24/19 06/25/19 06/25/19





 22:59 06:59 14:59


 


Intake Total 1000 1000 


 


Balance 1000 1000 











Lab Results Last 24 Hours: 


 Laboratory Results - last 24 hr











  06/24/19 06/24/19 06/24/19 Range/Units





  06:51 11:29 16:17 


 


WBC     (4.0-11.0)  K/uL


 


RBC     (4.50-5.90)  M/uL


 


Hgb     (13.0-17.0)  g/dL


 


Hct     (38.0-50.0)  %


 


MCV     (80.0-98.0)  fL


 


MCH     (27.0-32.0)  pg


 


MCHC     (31.0-37.0)  g/dL


 


RDW Std Deviation     (28.0-62.0)  fl


 


RDW Coeff of Arvind     (11.0-15.0)  %


 


Plt Count     (150-400)  K/uL


 


MPV     (7.40-12.00)  fL


 


Neut % (Auto)     (48.0-80.0)  %


 


Lymph % (Auto)     (16.0-40.0)  %


 


Mono % (Auto)     (0.0-15.0)  %


 


Eos % (Auto)     (0.0-7.0)  %


 


Baso % (Auto)     (0.0-1.5)  %


 


Neut # (Auto)     (1.4-5.7)  K/uL


 


Lymph # (Auto)     (0.6-2.4)  K/uL


 


Mono # (Auto)     (0.0-0.8)  K/uL


 


Eos # (Auto)     (0.0-0.7)  K/uL


 


Baso # (Auto)     (0.0-0.1)  K/uL


 


Nucleated RBC %     /100WBC


 


Nucleated RBCs #     K/uL


 


Sodium     (136-148)  mmol/L


 


Potassium     (3.5-5.1)  mmol/L


 


Chloride     ()  mmol/L


 


Carbon Dioxide     (21.0-32.0)  mmol/L


 


BUN     (7.0-18.0)  mg/dL


 


Creatinine     (0.8-1.3)  mg/dL


 


Est Cr Clr Drug Dosing     mL/min


 


Estimated GFR (MDRD)     ml/min


 


Glucose     ()  mg/dL


 


POC Glucose  207 H  237 H  193 H  ()  mg/dL


 


Calcium     (8.5-10.1)  mg/dL


 


Vitamin B12     (193-986)  pg/mL














  06/24/19 06/25/19 06/25/19 Range/Units





  22:24 04:50 04:50 


 


WBC    6.21  (4.0-11.0)  K/uL


 


RBC    4.07 L  (4.50-5.90)  M/uL


 


Hgb    12.2 L  (13.0-17.0)  g/dL


 


Hct    37.5 L  (38.0-50.0)  %


 


MCV    92.1  (80.0-98.0)  fL


 


MCH    30.0  (27.0-32.0)  pg


 


MCHC    32.5  (31.0-37.0)  g/dL


 


RDW Std Deviation    46.6  (28.0-62.0)  fl


 


RDW Coeff of Arvind    14  (11.0-15.0)  %


 


Plt Count    208  (150-400)  K/uL


 


MPV    10.20  (7.40-12.00)  fL


 


Neut % (Auto)    64.1  (48.0-80.0)  %


 


Lymph % (Auto)    22.9  (16.0-40.0)  %


 


Mono % (Auto)    10.6  (0.0-15.0)  %


 


Eos % (Auto)    1.9  (0.0-7.0)  %


 


Baso % (Auto)    0.5  (0.0-1.5)  %


 


Neut # (Auto)    4.0  (1.4-5.7)  K/uL


 


Lymph # (Auto)    1.4  (0.6-2.4)  K/uL


 


Mono # (Auto)    0.7  (0.0-0.8)  K/uL


 


Eos # (Auto)    0.1  (0.0-0.7)  K/uL


 


Baso # (Auto)    0.0  (0.0-0.1)  K/uL


 


Nucleated RBC %    0.0  /100WBC


 


Nucleated RBCs #    0  K/uL


 


Sodium     (136-148)  mmol/L


 


Potassium     (3.5-5.1)  mmol/L


 


Chloride     ()  mmol/L


 


Carbon Dioxide     (21.0-32.0)  mmol/L


 


BUN     (7.0-18.0)  mg/dL


 


Creatinine     (0.8-1.3)  mg/dL


 


Est Cr Clr Drug Dosing     mL/min


 


Estimated GFR (MDRD)     ml/min


 


Glucose     ()  mg/dL


 


POC Glucose  206 H    ()  mg/dL


 


Calcium     (8.5-10.1)  mg/dL


 


Vitamin B12   358   (193-986)  pg/mL














  06/25/19 06/25/19 Range/Units





  04:50 06:06 


 


WBC    (4.0-11.0)  K/uL


 


RBC    (4.50-5.90)  M/uL


 


Hgb    (13.0-17.0)  g/dL


 


Hct    (38.0-50.0)  %


 


MCV    (80.0-98.0)  fL


 


MCH    (27.0-32.0)  pg


 


MCHC    (31.0-37.0)  g/dL


 


RDW Std Deviation    (28.0-62.0)  fl


 


RDW Coeff of Arvind    (11.0-15.0)  %


 


Plt Count    (150-400)  K/uL


 


MPV    (7.40-12.00)  fL


 


Neut % (Auto)    (48.0-80.0)  %


 


Lymph % (Auto)    (16.0-40.0)  %


 


Mono % (Auto)    (0.0-15.0)  %


 


Eos % (Auto)    (0.0-7.0)  %


 


Baso % (Auto)    (0.0-1.5)  %


 


Neut # (Auto)    (1.4-5.7)  K/uL


 


Lymph # (Auto)    (0.6-2.4)  K/uL


 


Mono # (Auto)    (0.0-0.8)  K/uL


 


Eos # (Auto)    (0.0-0.7)  K/uL


 


Baso # (Auto)    (0.0-0.1)  K/uL


 


Nucleated RBC %    /100WBC


 


Nucleated RBCs #    K/uL


 


Sodium  134 L   (136-148)  mmol/L


 


Potassium  3.8   (3.5-5.1)  mmol/L


 


Chloride  97 L   ()  mmol/L


 


Carbon Dioxide  31.2   (21.0-32.0)  mmol/L


 


BUN  17   (7.0-18.0)  mg/dL


 


Creatinine  0.8   (0.8-1.3)  mg/dL


 


Est Cr Clr Drug Dosing  94.10   mL/min


 


Estimated GFR (MDRD)  > 60.0   ml/min


 


Glucose  179 H   ()  mg/dL


 


POC Glucose   185 H  ()  mg/dL


 


Calcium  9.0   (8.5-10.1)  mg/dL


 


Vitamin B12    (193-986)  pg/mL











Med Orders - Current: 


 Current Medications





Docusate Sodium (Colace)  100 mg PO BID PRN


   PRN Reason: Constipation


Heparin Sodium (Porcine) (Heparin Sodium)  5,000 units [facility]BCUT [facility]


   Last Admin: 06/25/19 06:02 Dose:  5,000 units


Ibuprofen (Motrin)  400 mg PO Q6H PRN


   PRN Reason: Pain (mild 1-3)


Indapamide (Indapamide)  2.5 mg PO DAILY [facility]


   Last Admin: 06/25/19 08:55 Dose:  2.5 mg


Insulin Aspart (Novolog)  0 unit SUBCUT TIDAC [facility]; Protocol


   Last Admin: 06/25/19 06:31 Dose:  3 units


Lorazepam (Ativan)  2 mg IM Q6H PRN


   PRN Reason: Agitation


   Last Admin: 06/23/19 23:55 Dose:  2 mg


Losartan Potassium (Cozaar)  100 mg PO DAILY [facility]


   Last Admin: 06/25/19 08:56 Dose:  100 mg


Ondansetron HCl (Zofran Odt)  4 mg PO Q6H PRN


   PRN Reason: nausea, able to take PO


Sodium Chloride (Saline Flush)  10 ml FLUSH ASDIRECTED PRN


   PRN Reason: Keep Vein Open


   Last Admin: 06/15/19 12:12 Dose:  10 ml


Sodium Chloride (Saline Flush)  2.5 ml FLUSH ASDIRECTED PRN


   PRN Reason: Keep Vein Open


   Last Admin: 06/15/19 12:12 Dose:  2.5 ml


Temazepam (Restoril)  15 mg PO BEDTIME PRN


   PRN Reason: Sleep


   Last Admin: 06/24/19 20:49 Dose:  15 mg





Discontinued Medications





Cefdinir (Omnicef)  300 mg PO BID [facility]


   Last Admin: 06/24/19 09:01 Dose:  300 mg


Haloperidol (Haldol)  5 mg PO ONETIME ONE


   Stop: 06/15/19 15:04


   Last Admin: 06/15/19 15:11 Dose:  5 mg


Haloperidol (Haldol)  5 mg PO Q6H PRN


   PRN Reason: Agitation


   Last Admin: 06/17/19 23:38 Dose:  5 mg


Haloperidol (Haldol)  10 mg PO ONETIME ONE


   Stop: 06/22/19 13:53


   Last Admin: 06/22/19 14:22 Dose:  Not Given


Haloperidol Lactate (Haldol)  5 mg IM ONETIME ONE


   Stop: 06/22/19 14:17


   Last Admin: 06/22/19 14:22 Dose:  5 mg


Sodium Chloride (Normal Saline)  1,000 mls @ 999 mls/hr IV .Bolus ONE


   Stop: 06/15/19 12:59


   Last Admin: 06/15/19 12:11 Dose:  999 mls/hr


Levofloxacin/Dextrose 500 mg/ (Premix)  100 mls @ 100 mls/hr IV ONETIME ONE


   Stop: 06/15/19 14:32


   Last Admin: 06/15/19 13:47 Dose:  100 mls/hr


Sodium Chloride (Normal Saline)  1,000 mls @ 75 mls/hr IV ASDIRECTED [facility]


   Last Admin: 06/17/19 06:41 Dose:  75 mls/hr


Ceftriaxone Sodium/Dextrose 1 (gm/ Premix)  50 mls @ 100 mls/hr IV Q24H [facility]


   Last Admin: 06/17/19 09:03 Dose:  100 mls/hr


Insulin Aspart (Novolog)  0 unit SUBCUT ACBREAKFASTANDBED [facility]; Protocol


Lorazepam (Ativan)  2 mg IM ONETIME ONE


   Stop: 06/22/19 14:57


   Last Admin: 06/22/19 15:04 Dose:  2 mg


Oxycodone HCl (Oxycodone)  5 mg PO Q4H PRN


   PRN Reason: Pain (moderate 4-6)











- Exam


General: Alert, Oriented, Cooperative, No Acute Distress


Lungs: Clear to Auscultation, Normal Respiratory Effort


Cardiovascular: Regular Rate, Regular Rhythm


GI/Abdominal Exam: Normal Bowel Sounds, Soft, Non-Tender, No Organomegaly


Extremities: Normal Inspection, Normal Range of Motion, Non-Tender, No Pedal 

Edema


Neurological: No New Focal Deficit


Psy/Mental Status: Alert, Normal Affect, Normal Mood





- Problem List & Annotations


(1) UTI (urinary tract infection)


SNOMED Code(s): 94120610


   Code(s): N39.0 - URINARY TRACT INFECTION, SITE NOT SPECIFIED   Status: Acute

   Current Visit: Yes   


Qualifiers: 


   Urinary tract infection type: acute cystitis   Hematuria presence: without 

hematuria   Qualified Code(s): N30.00 - Acute cystitis without hematuria   





(2) Delayed emotional development


SNOMED Code(s): 768266305


   Code(s): F88 - OTHER DISORDERS OF PSYCHOLOGICAL DEVELOPMENT   Status: 

Chronic   Priority: High   Current Visit: Yes   





(3) Diabetes mellitus type 2 in obese


SNOMED Code(s): 18569660


   Code(s): E11.69 - TYPE 2 DIABETES MELLITUS WITH OTHER SPECIFIED COMPLICATION

; E66.9 - OBESITY, UNSPECIFIED   Status: Chronic   Priority: High   Current 

Visit: Yes   





- Problem List Review


Problem List Initiated/Reviewed/Updated: Yes





- Plan


Plan:: 


This 58 year old male admitted with AMS and UTI








1. DM Type 2: Novolog SSI. Holding Metformin and Victozia. BS stable.





2. PMH developmental delays: Has been wandering and not taking oral 

medications. Ativan IM . Looking at further evaluation of mental health. [facility], 

social work assisting with possible placement. 





 VTE prophylaxis: Heparin. 





Dispo: Pending placement and further screening for SNF placement.

## 2019-06-26 RX ADMIN — DEXTROSE SCH UNITS: 10 SOLUTION INTRAVENOUS at 13:58

## 2019-06-26 RX ADMIN — DEXTROSE SCH UNITS: 10 SOLUTION INTRAVENOUS at 22:02

## 2019-06-26 RX ADMIN — DEXTROSE SCH UNITS: 10 SOLUTION INTRAVENOUS at 06:26

## 2019-06-26 NOTE — PCM.PN
- General Info


Date of Service: 06/26/19


Admission Dx/Problem (Free Text): 








Self care deficit 





Subjective Update: 





Feeling good this morning. No other concerns.





Sitter at bedside.


Functional Status: Reports: Pain Controlled, Tolerating Diet, Ambulating, 

Urinating





- Review of Systems


General: Reports: No Symptoms.  Denies: Weakness, Fatigue, Malaise


HEENT: Reports: No Symptoms


Pulmonary: Reports: No Symptoms.  Denies: Shortness of Breath


Cardiovascular: Reports: No Symptoms.  Denies: Chest Pain


Gastrointestinal: Reports: No Symptoms.  Denies: Abdominal Pain, Nausea, 

Vomiting


Musculoskeletal: Reports: No Symptoms


Skin: Reports: No Symptoms


Neurological: Reports: No Symptoms


Psychiatric: Reports: No Symptoms





- Patient Data


Vitals - Most Recent: 


 Last Vital Signs











Temp  97.0 F   06/26/19 08:00


 


Pulse  81   06/26/19 08:00


 


Resp  18   06/26/19 08:00


 


BP  145/95 H  06/26/19 08:00


 


Pulse Ox  96   06/26/19 08:00











Weight - Most Recent: 97.069 kg


I&O - Last 24 Hours: 


 Intake & Output











 06/25/19 06/26/19 06/26/19





 22:59 06:59 14:59


 


Intake Total 2190 3400 


 


Output Total 0  


 


Balance 2190 3400 











Lab Results Last 24 Hours: 


 Laboratory Results - last 24 hr











  06/25/19 06/25/19 06/25/19 Range/Units





  11:01 16:09 21:43 


 


POC Glucose  192 H  251 H  139 H  ()  mg/dL














  06/26/19 Range/Units





  06:28 


 


POC Glucose  152 H  ()  mg/dL











Med Orders - Current: 


 Current Medications





Docusate Sodium (Colace)  100 mg PO BID PRN


   PRN Reason: Constipation


Heparin Sodium (Porcine) (Heparin Sodium)  5,000 units SUBCUT Q8H [facility]


   Last Admin: 06/26/19 06:26 Dose:  5,000 units


Ibuprofen (Motrin)  400 mg PO Q6H PRN


   PRN Reason: Pain (mild 1-3)


Indapamide (Indapamide)  2.5 mg PO DAILY [facility]


   Last Admin: 06/25/19 08:55 Dose:  2.5 mg


Insulin Aspart (Novolog)  0 unit SUBCUT TIDAC [facility]; Protocol


   Last Admin: 06/26/19 07:38 Dose:  3 units


Lorazepam (Ativan)  2 mg IM Q6H PRN


   PRN Reason: Agitation


   Last Admin: 06/23/19 23:55 Dose:  2 mg


Losartan Potassium (Cozaar)  100 mg PO DAILY [facility]


   Last Admin: 06/25/19 08:56 Dose:  100 mg


Ondansetron HCl (Zofran Odt)  4 mg PO Q6H PRN


   PRN Reason: nausea, able to take PO


Sodium Chloride (Saline Flush)  10 ml FLUSH ASDIRECTED PRN


   PRN Reason: Keep Vein Open


   Last Admin: 06/15/19 12:12 Dose:  10 ml


Sodium Chloride (Saline Flush)  2.5 ml FLUSH ASDIRECTED PRN


   PRN Reason: Keep Vein Open


   Last Admin: 06/15/19 12:12 Dose:  2.5 ml


Temazepam (Restoril)  15 mg PO BEDTIME PRN


   PRN Reason: Sleep


   Last Admin: 06/24/19 20:49 Dose:  15 mg





Discontinued Medications





Cefdinir (Omnicef)  300 mg PO BID [facility]


   Last Admin: 06/24/19 09:01 Dose:  300 mg


Haloperidol (Haldol)  5 mg PO ONETIME ONE


   Stop: 06/15/19 15:04


   Last Admin: 06/15/19 15:11 Dose:  5 mg


Haloperidol (Haldol)  5 mg PO Q6H PRN


   PRN Reason: Agitation


   Last Admin: 06/17/19 23:38 Dose:  5 mg


Haloperidol (Haldol)  10 mg PO ONETIME ONE


   Stop: 06/22/19 13:53


   Last Admin: 06/22/19 14:22 Dose:  Not Given


Haloperidol Lactate (Haldol)  5 mg IM ONETIME ONE


   Stop: 06/22/19 14:17


   Last Admin: 06/22/19 14:22 Dose:  5 mg


Sodium Chloride (Normal Saline)  1,000 mls @ 999 mls/hr IV .Bolus ONE


   Stop: 06/15/19 12:59


   Last Admin: 06/15/19 12:11 Dose:  999 mls/hr


Levofloxacin/Dextrose 500 mg/ (Premix)  100 mls @ 100 mls/hr IV ONETIME ONE


   Stop: 06/15/19 14:32


   Last Admin: 06/15/19 13:47 Dose:  100 mls/hr


Sodium Chloride (Normal Saline)  1,000 mls @ 75 mls/hr IV ASDIRECTED [facility]


   Last Admin: 06/17/19 06:41 Dose:  75 mls/hr


Ceftriaxone Sodium/Dextrose 1 (gm/ Premix)  50 mls @ 100 mls/hr IV Q24H [facility]


   Last Admin: 06/17/19 09:03 Dose:  100 mls/hr


Insulin Aspart (Novolog)  0 unit SUBCUT ACBREAKFASTANDBED [facility]; Protocol


Lorazepam (Ativan)  2 mg IM ONETIME ONE


   Stop: 06/22/19 14:57


   Last Admin: 06/22/19 15:04 Dose:  2 mg


Oxycodone HCl (Oxycodone)  5 mg PO Q4H PRN


   PRN Reason: Pain (moderate 4-6)











- Exam


General: Alert, Oriented, Cooperative, No Acute Distress


Lungs: Clear to Auscultation, Normal Respiratory Effort


Cardiovascular: Regular Rate, Regular Rhythm


GI/Abdominal Exam: Normal Bowel Sounds, Soft, Non-Tender, No Organomegaly


Extremities: Normal Inspection, Normal Range of Motion, Non-Tender, No Pedal 

Edema


Neurological: No New Focal Deficit


Psy/Mental Status: Alert, Normal Affect, Normal Mood





- Problem List & Annotations


(1) UTI (urinary tract infection)


SNOMED Code(s): 87043839


   Code(s): N39.0 - URINARY TRACT INFECTION, SITE NOT SPECIFIED   Status: 

Resolved   Current Visit: Yes   


Qualifiers: 


   Urinary tract infection type: acute cystitis   Hematuria presence: without 

hematuria   Qualified Code(s): N30.00 - Acute cystitis without hematuria   





(2) Delayed emotional development


SNOMED Code(s): 787462740


   Code(s): F88 - OTHER DISORDERS OF PSYCHOLOGICAL DEVELOPMENT   Status: 

Chronic   Priority: High   Current Visit: Yes   





(3) Diabetes mellitus type 2 in obese


SNOMED Code(s): 68213833


   Code(s): E11.69 - TYPE 2 DIABETES MELLITUS WITH OTHER SPECIFIED COMPLICATION

; E66.9 - OBESITY, UNSPECIFIED   Status: Chronic   Priority: High   Current 

Visit: Yes   





- Problem List Review


Problem List Initiated/Reviewed/Updated: Yes





- My Orders


Last 24 Hours: 


My Active Orders





06/25/19 15:16


Consult to Physician [CONS] Routine 





06/25/19 15:17


Notify Provider Consults [RC] ASDIRECTED 














- Plan


Plan:: 


This 58 year old male admitted with AMS and UTI








1. DM Type 2: Novolog SSI. Holding Metformin and Victozia. BS stable.





2. PMH developmental delays: Dr Mejia to evaluate this afternoon to help with 

disposition. No recent Ativan, last dos 6/23. [facility], social work assisting with 

possible placement. Surendra has declined admission at this time. Looking into 

[place]. BrotherGerardo, is updated via [facility]. 





 VTE prophylaxis: Heparin. 





Dispo: Pending placement and further screening for SNF placement.

## 2019-06-27 RX ADMIN — DEXTROSE SCH UNITS: 10 SOLUTION INTRAVENOUS at 05:54

## 2019-06-27 RX ADMIN — DEXTROSE SCH UNITS: 10 SOLUTION INTRAVENOUS at 14:18

## 2019-06-27 RX ADMIN — DEXTROSE SCH UNITS: 10 SOLUTION INTRAVENOUS at 22:01

## 2019-06-27 RX ADMIN — LORAZEPAM PRN MG: 2 INJECTION INTRAMUSCULAR; INTRAVENOUS at 18:09

## 2019-06-27 NOTE — PCM.PN
- General Info


Date of Service: 06/27/19


Admission Dx/Problem (Free Text): 








Self care deficit 





Subjective Update: 





Feeling good today, talkative. No concerns. No pain.


Functional Status: Reports: Pain Controlled, Tolerating Diet, Ambulating, 

Urinating





- Review of Systems


General: Reports: No Symptoms.  Denies: Weakness, Fatigue


HEENT: Reports: No Symptoms


Pulmonary: Reports: No Symptoms.  Denies: Shortness of Breath


Cardiovascular: Reports: No Symptoms.  Denies: Chest Pain


Gastrointestinal: Reports: No Symptoms.  Denies: Abdominal Pain, Nausea, 

Vomiting


Genitourinary: Reports: No Symptoms.  Denies: Dysuria, Frequency, Burning


Musculoskeletal: Reports: No Symptoms


Skin: Reports: No Symptoms


Neurological: Reports: No Symptoms


Psychiatric: Reports: No Symptoms





- Patient Data


Vitals - Most Recent: 


 Last Vital Signs











Temp  97.3 F   06/27/19 08:00


 


Pulse  81   06/27/19 08:00


 


Resp  17   06/27/19 08:00


 


BP  132/79   06/27/19 08:44


 


Pulse Ox  98   06/27/19 08:00











Weight - Most Recent: 97.069 kg


I&O - Last 24 Hours: 


 Intake & Output











 06/26/19 06/27/19 06/27/19





 22:59 06:59 14:59


 


Intake Total 2300 2300 


 


Output Total 0  


 


Balance 2300 2300 











Lab Results Last 24 Hours: 


 Laboratory Results - last 24 hr











  06/26/19 06/26/19 06/27/19 Range/Units





  16:46 21:15 06:07 


 


POC Glucose  222 H  151 H  238 H  ()  mg/dL














  06/27/19 Range/Units





  11:11 


 


POC Glucose  159 H  ()  mg/dL











Med Orders - Current: 


 Current Medications





Docusate Sodium (Colace)  100 mg PO BID PRN


   PRN Reason: Constipation


Heparin Sodium (Porcine) (Heparin Sodium)  5,000 units SUBCUT Q8H [facility]


   Last Admin: 06/27/19 05:54 Dose:  5,000 units


Ibuprofen (Motrin)  400 mg PO Q6H PRN


   PRN Reason: Pain (mild 1-3)


Indapamide (Indapamide)  2.5 mg PO DAILY [facility]


   Last Admin: 06/27/19 08:48 Dose:  2.5 mg


Insulin Aspart (Novolog)  0 unit SUBCUT TIDAC [facility]; Protocol


   Last Admin: 06/27/19 11:35 Dose:  3 units


Lorazepam (Ativan)  2 mg IM Q6H PRN


   PRN Reason: Agitation


   Last Admin: 06/23/19 23:55 Dose:  2 mg


Losartan Potassium (Cozaar)  100 mg PO DAILY [facility]


   Last Admin: 06/27/19 08:44 Dose:  100 mg


Ondansetron HCl (Zofran Odt)  4 mg PO Q6H PRN


   PRN Reason: nausea, able to take PO


Sodium Chloride (Saline Flush)  10 ml FLUSH ASDIRECTED PRN


   PRN Reason: Keep Vein Open


   Last Admin: 06/15/19 12:12 Dose:  10 ml


Sodium Chloride (Saline Flush)  2.5 ml FLUSH ASDIRECTED PRN


   PRN Reason: Keep Vein Open


   Last Admin: 06/15/19 12:12 Dose:  2.5 ml


Temazepam (Restoril)  15 mg PO BEDTIME PRN


   PRN Reason: Sleep


   Last Admin: 06/24/19 20:49 Dose:  15 mg





Discontinued Medications





Cefdinir (Omnicef)  300 mg PO BID [facility]


   Last Admin: 06/24/19 09:01 Dose:  300 mg


Haloperidol (Haldol)  5 mg PO ONETIME ONE


   Stop: 06/15/19 15:04


   Last Admin: 06/15/19 15:11 Dose:  5 mg


Haloperidol (Haldol)  5 mg PO Q6H PRN


   PRN Reason: Agitation


   Last Admin: 06/17/19 23:38 Dose:  5 mg


Haloperidol (Haldol)  10 mg PO ONETIME ONE


   Stop: 06/22/19 13:53


   Last Admin: 06/22/19 14:22 Dose:  Not Given


Haloperidol Lactate (Haldol)  5 mg IM ONETIME ONE


   Stop: 06/22/19 14:17


   Last Admin: 06/22/19 14:22 Dose:  5 mg


Sodium Chloride (Normal Saline)  1,000 mls @ 999 mls/hr IV .Bolus ONE


   Stop: 06/15/19 12:59


   Last Admin: 06/15/19 12:11 Dose:  999 mls/hr


Levofloxacin/Dextrose 500 mg/ (Premix)  100 mls @ 100 mls/hr IV ONETIME ONE


   Stop: 06/15/19 14:32


   Last Admin: 06/15/19 13:47 Dose:  100 mls/hr


Sodium Chloride (Normal Saline)  1,000 mls @ 75 mls/hr IV ASDIRECTED [facility]


   Last Admin: 06/17/19 06:41 Dose:  75 mls/hr


Ceftriaxone Sodium/Dextrose 1 (gm/ Premix)  50 mls @ 100 mls/hr IV Q24H [facility]


   Last Admin: 06/17/19 09:03 Dose:  100 mls/hr


Insulin Aspart (Novolog)  0 unit SUBCUT ACBREAKFASTANDBED [facility]; Protocol


Lorazepam (Ativan)  2 mg IM ONETIME ONE


   Stop: 06/22/19 14:57


   Last Admin: 06/22/19 15:04 Dose:  2 mg


Oxycodone HCl (Oxycodone)  5 mg PO Q4H PRN


   PRN Reason: Pain (moderate 4-6)











- Exam


General: Alert, Oriented, Cooperative, No Acute Distress


Lungs: Clear to Auscultation, Normal Respiratory Effort


Cardiovascular: Regular Rate, Regular Rhythm


GI/Abdominal Exam: Normal Bowel Sounds, Soft, Non-Tender, No Organomegaly


Extremities: Normal Inspection, Normal Range of Motion, Non-Tender, No Pedal 

Edema


Skin: Warm, Dry


Wound/Incisions: Dressing Dry and Intact (abrasion to elbow, bandaid intact no s

/s infection)


Psy/Mental Status: Alert, Normal Affect, Normal Mood





- Problem List & Annotations


(1) Delayed emotional development


SNOMED Code(s): 749703482


   Code(s): F88 - OTHER DISORDERS OF PSYCHOLOGICAL DEVELOPMENT   Status: 

Chronic   Priority: High   Current Visit: Yes   





(2) Diabetes mellitus type 2 in obese


SNOMED Code(s): 20184380


   Code(s): E11.69 - TYPE 2 DIABETES MELLITUS WITH OTHER SPECIFIED COMPLICATION

; E66.9 - OBESITY, UNSPECIFIED   Status: Chronic   Priority: High   Current 

Visit: Yes   





- Problem List Review


Problem List Initiated/Reviewed/Updated: Yes





- Plan


Plan:: 


This 58 year old male admitted with AMS and UTI








1. DM Type 2: Novolog SSI. Holding Metformin and Victozia. BS stable.





2. PMH developmental delays: Dr Mejia evaluated yesterday afternoon to help 

with disposition, no concerns regariding psychiatric. Feels he is a perfect 

canidate for SNF.  [facility], social work assisting with possible placement. Surendra 

has declined admission at this time. Looking into [place]. Brother, Gerardo, 

is updated via [facility]. 





 VTE prophylaxis: Heparin. 





Dispo: Pending placement and further screening for SNF placement.

## 2019-06-27 NOTE — CONS
DATE OF CONSULTATION:

06/26/2019

 

YOB: 1960

 

PRIMARY CARE PHYSICIAN:

None PCP

 

Site where the services are provided, our [facility] in [place], [place].  Site where the services are provided from our offices in

[facility].  Length of service for this 60-minute inpatient telemedicine

event is 60 minutes.

 

IDENTIFICATION:

The patient is a 58-year-old male, who was admitted to the inpatient Med/Surg

unit at [facility] in [place], [place].  He is seen for

psychiatric evaluation per the request of staff attending, Dr. Silvestre and nurse

practitioner, Kimberli Levy.

 

CHIEF COMPLAINT:

"I have diabetes."

 

HISTORY OF PRESENT ILLNESS:

The patient is a 58-year-old male, who states he was admitted "sometime in May"

because of complications of diabetes and also because of "surgery on my ankle."

He states that he is still in the hospital because he is "awaiting the results"

of the tests that have been done __________ and also the procedures that have

been done on his ankle noting "they put a brace on him."  The patient states his

mood is good.  He is sleeping well and he does not have any problems with

excessive anxiety.  Staff is reporting that the patient has been wandering in

the halls and the family member he had been living with refused to take him back

because they state that they have a hard time managing him at this point in

time.  He has reported history of MR but staff reporting that some members of

the treatment team were concerned if whether the patient was exhibiting

psychotic symptoms.  Per his report, the patient denies that he has psychotic

symptoms, and in fact, he denies any psychotic delusional paranoid symptoms at

all.  He denies any suicidal or homicidal.  Again, states that he is doing well

generally, mood typically just gets a little restless being cooked up on the

hospital unit.  The patient does state that he has good appetite and maintains

interest in day-to-day activities particularly in walking the halls of the

hospital to stay active.

 

MEDICATIONS:

Psychiatric medications prior to admission none.  Since admission, the patient

has been prescribed p.r.n. Ativan, but he had his last dose on June 23rd and he

has a p.r.n. dose of Restoril 15 mg p.o. at bedtime.  He has not been using this

medication for sleep at night.

 

ALLERGIES:

1. Tylenol.

2. Naproxen.

3. Penicillin.

 

PAST MEDICAL HISTORY:

1. Type 2 diabetes.

2. Hypertension.

There is no report of surgically repaired ankle from staff.

 

REVIEW OF SYSTEMS:

Aside from endocrine and cardiovascular, all other major organ systems are

negative at this point in time for acute difficulties or complications.

 

FAMILY PSYCHIATRIC AND CD HISTORY:

__________ reported that the patient has a sister who is possibly having mental

retardation as well.

 

PAST PSYCHIATRIC AND CD HISTORY:

Essentially negative.  The patient is denying any psychiatric hospitalizations

or chemical dependency treatments.  He is a nontobacco user.  He denies any past

psychiatric medication history.

 

SOCIAL HISTORY:

The patient was born and raised in [place], [place].  He has 1 sister and

3 brothers.  He lives in [place].  He had been living with his sister and a

brother.  He states he has never been .  He has no children.  Not

involved in any current relationship.  He is on social security disability for

his mental disability.

 

MENTAL STATUS EXAM:

The patient is a 58-year-old soft-spoken white male, in no apparent distress.

Speech is regular rate and rhythm, but short duration of utterance overall.  The

patient is cognitively oriented x3.  Psychomotor activities within normal

limits.  There are no abnormal motor movements or ticks observed.  Gait is

steady.  Station is normal.  Mood is good.  Affect is cooperative overall for

the purposes of the telemedicine evaluation.  There is no behavioral or stated

evidence of acute suicidal or homicidal ideation or acute psychotic, delusional,

or paranoid symptoms.  Thought processes are slow, but organized.  There are no

acute manic symptoms or loose associations evident.  Judgment and insight appear

impaired secondary to the patient's mental retardation.  Motivation for help

appears fair to good.

 

VITAL SIGNS:

134/99, 90, 16, 97.1 degrees.

 

IMPRESSION:

Axis I:  None.

Axis II:  Mental retardation-mild.

Axis III:

1. Diabetes, type 2.

2. Hypertension.

Axis IV:  Severe.

Axis V:  55 to 60.

 

PLAN:

1. No psychiatric medications necessary at this point in time since the

    patient is not displaying any psychiatric pathology.

2. May continue the p.r.n. medications of Ativan for any breakthrough anxiety

    while on the unit and Restoril for sleep initiation and maintenance again

    as needed.

3. Would recommend that the patient be transferred to assisted living

    facility, when medically stable if he is unable to go back to live with

    family because of care constraints.  This patient does appear to be a good

    candidate for an assisted living facility as he is not displaying any acute

    psychiatric pathology and he is cooperative and the main issue for the

    patient is his mental retardation.

4. We will continue to follow up with the patient on an as needed basis while

    he remains on the inpatient medical unit.

5. We will follow with the patient sooner if any complications in the interim.

    If the patient does have any breakthrough symptoms while on the med unit,

    we will revisit the issue of the need for psychiatric medications again at

    this point in time.  Do not see any indication for psychiatric medications

    from antipsychotic or antidepressant modality.

6. Crisis plan is in place.

 

 

ELIJAH SIMMONS

DD:  06/27/2019 06:59:21

DT:  06/27/2019 11:06:53

Job #:  [id number]

## 2019-06-28 VITALS — DIASTOLIC BLOOD PRESSURE: 97 MMHG | SYSTOLIC BLOOD PRESSURE: 147 MMHG

## 2019-06-28 RX ADMIN — DEXTROSE SCH UNITS: 10 SOLUTION INTRAVENOUS at 06:03

## 2019-06-28 RX ADMIN — DEXTROSE SCH UNITS: 10 SOLUTION INTRAVENOUS at 14:03

## 2019-06-28 NOTE — PCM.DCSUM1
**Discharge Summary





- Hospital Course


Brief History: This 58-year-old gentleman with pmh of DM Type 2, HTN and 

developmental delay who presented to the emergency department with his family 

out of concern for his altered mental status, bizarre behavior and not taking 

any of his medications. The patient was previously admitted in May 2019. The 

patient does have a history of trying to escape at times from his previous 

hospitalization. The patient's family says that they cannot take care of him 

anymore at home. Further, the patient's family says that he has been warding 

urine in jars under his bed as well as not being able to take care of his 

medications to adequately take care of himself. The patient himself is alert 

and mostly oriented although he is somewhat vague in his answers and does not 

know how he got here. A review of records and indicate the patient had been 

driving erratically and had been picked up by the police department. The 

patient has admitted to his family that he would like to go to [facility].


Diagnosis: Stroke: No





- Discharge Data


Discharge Date: 06/28/19


Discharge Disposition: Home, Self-Care 01


Condition: Stable





- Discharge Diagnosis/Problem(s)


(1) Delayed emotional development


SNOMED Code(s): 914504256


   ICD Code: F88 - OTHER DISORDERS OF PSYCHOLOGICAL DEVELOPMENT   Status: 

Chronic   Priority: High   Current Visit: Yes   





(2) Diabetes mellitus type 2 in obese


SNOMED Code(s): 03440887


   ICD Code: E11.69 - TYPE 2 DIABETES MELLITUS WITH OTHER SPECIFIED COMPLICATION

; E66.9 - OBESITY, UNSPECIFIED   Status: Chronic   Priority: High   Current 

Visit: Yes   





- Patient Summary/Data


Consults: 


 Consultations





06/25/19 15:16


Consult to Physician [CONS] Routine 














- Patient Instructions


Diet: Diabetic Diet


Activity: As Tolerated


Driving: Do Not Drive


Showering/Bathing: May Shower


Notify Provider of: Fever, Increased Pain, Swelling and Redness, Drainage, 

Nausea and/or Vomiting





- Discharge Plan


*PRESCRIPTION DRUG MONITORING PROGRAM REVIEWED*: No


*COPY OF PRESCRIPTION DRUG MONITORING REPORT IN PATIENT JOSE MIGUEL: No


Home Medications: 


 Home Meds





metFORMIN [Glucophage] 1,000 mg PO BID 03/17/17 [History]


Indapamide 2.5 mg PO DAILY 06/15/19 [History]


Insuln Asp Prot/Insulin Aspart [NovoLOG Mix 70-30] 64 units SQ BID 06/15/19 [

History]


Liraglutide [Victoza] 1.8 mg SQ BEDTIME 06/15/19 [History]


Losartan [Cozaar] 100 mg PO DAILY 06/15/19 [History]


atorvaSTATin [Lipitor] 20 mg PO DAILY 06/17/19 [History]








Oxygen Therapy Mode: Room Air


Referrals: 


Abdifatah Kerns MD [Ordering Only Provider] -  (follow up in 1 week)





- Discharge Summary/Plan Comment


DC Time >30 min.: No


Discharge Summary/Plan Comment: 





Admitting diagnoses:





AMS


UTI








Discharge Diagnoses:





Developmental delay


UTI- fully treated.





Other PMH





DM Type 2


HTN





Zohaib was admitted and found to have UTI, likely causing some AMS. After 

treatment of UTI was initiated, Zohaib became more and clear near baseline. 

Brother continued to want him placed in nursing facility as he felt they could 

no longer take care of him. He was evaluated and Surendra declined admission. Jaycob Farias was consulted for further evaluation and felt there were no 

acute psychiatric issues with Zohaib at this time.  is attempting to get 

guardianship but currently there is none in place. Zohaib is requesting to go 

home.  did fill out [facility] home application, which is 

pending. Gerardo, brother, will take Zohaib home today. he is to continue his home 

medication as previously prescribed. He will have follow up with PCP, Dr Kerns 

in 1 week. 











- General Info


Date of Service: 06/28/19


Admission Dx/Problem (Free Text: 








Self care deficit 





Subjective Update: 





Sitting on edge of bed, feeling good. Finished breakfast. Eager to go home. No 

concerns.


Functional Status: Reports: Pain Controlled, Tolerating Diet, Ambulating, 

Urinating





- Review of Systems


General: Reports: No Symptoms.  Denies: Weakness


HEENT: Reports: No Symptoms.  Denies: Sore Throat, Rhinitis


Pulmonary: Reports: No Symptoms.  Denies: Shortness of Breath


Cardiovascular: Reports: No Symptoms.  Denies: Chest Pain


Gastrointestinal: Reports: No Symptoms.  Denies: Abdominal Pain, Nausea, 

Vomiting


Genitourinary: Reports: No Symptoms


Musculoskeletal: Reports: No Symptoms


Skin: Reports: No Symptoms


Neurological: Reports: No Symptoms


Psychiatric: Reports: No Symptoms





- Patient Data


Vitals - Most Recent: 


 Last Vital Signs











Temp  98.0 F   06/28/19 08:00


 


Pulse  117 H  06/28/19 08:00


 


Resp  16   06/28/19 08:00


 


BP  147/97 H  06/28/19 08:08


 


Pulse Ox  96   06/28/19 08:00











Weight - Most Recent: 97.069 kg


I&O - Last 24 hours: 


 Intake & Output











 06/27/19 06/28/19 06/28/19





 22:59 06:59 14:59


 


Intake Total 1000 1050 360


 


Output Total 0  


 


Balance 1000 1050 360











Lab Results - Last 24 hrs: 


 Laboratory Results - last 24 hr











  06/27/19 06/27/19 06/27/19 Range/Units





  11:11 15:53 21:02 


 


POC Glucose  159 H  219 H  192 H  ()  mg/dL











Med Orders - Current: 


 Current Medications





Docusate Sodium (Colace)  100 mg PO BID PRN


   PRN Reason: Constipation


Heparin Sodium (Porcine) (Heparin Sodium)  5,000 units SUBCUT Q8H [facility]


   Last Admin: 06/28/19 06:03 Dose:  5,000 units


Ibuprofen (Motrin)  400 mg PO Q6H PRN


   PRN Reason: Pain (mild 1-3)


Indapamide (Indapamide)  2.5 mg PO DAILY [facility]


   Last Admin: 06/28/19 08:08 Dose:  2.5 mg


Insulin Aspart (Novolog)  0 unit SUBCUT TIDAC [facility]; Protocol


   Last Admin: 06/28/19 07:18 Dose:  15 units


Losartan Potassium (Cozaar)  100 mg PO DAILY [facility]


   Last Admin: 06/28/19 08:08 Dose:  100 mg


Ondansetron HCl (Zofran Odt)  4 mg PO Q6H PRN


   PRN Reason: nausea, able to take PO


Sodium Chloride (Saline Flush)  10 ml FLUSH ASDIRECTED PRN


   PRN Reason: Keep Vein Open


   Last Admin: 06/15/19 12:12 Dose:  10 ml


Sodium Chloride (Saline Flush)  2.5 ml FLUSH ASDIRECTED PRN


   PRN Reason: Keep Vein Open


   Last Admin: 06/15/19 12:12 Dose:  2.5 ml


Temazepam (Restoril)  15 mg PO BEDTIME PRN


   PRN Reason: Sleep


   Last Admin: 06/24/19 20:49 Dose:  15 mg





Discontinued Medications





Cefdinir (Omnicef)  300 mg PO BID [facility]


   Last Admin: 06/24/19 09:01 Dose:  300 mg


Haloperidol (Haldol)  5 mg PO ONETIME ONE


   Stop: 06/15/19 15:04


   Last Admin: 06/15/19 15:11 Dose:  5 mg


Haloperidol (Haldol)  5 mg PO Q6H PRN


   PRN Reason: Agitation


   Last Admin: 06/17/19 23:38 Dose:  5 mg


Haloperidol (Haldol)  10 mg PO ONETIME ONE


   Stop: 06/22/19 13:53


   Last Admin: 06/22/19 14:22 Dose:  Not Given


Haloperidol Lactate (Haldol)  5 mg IM ONETIME ONE


   Stop: 06/22/19 14:17


   Last Admin: 06/22/19 14:22 Dose:  5 mg


Haloperidol Lactate (Haldol) Confirm Administered Dose 5 mg .ROUTE .STK-MED ONE


   Stop: 06/27/19 18:19


   Last Admin: 06/27/19 18:22 Dose:  5 mg


Haloperidol Lactate (Haldol)  5 mg IM ONETIME ONE


   Stop: 06/27/19 18:22


   Last Admin: 06/27/19 18:52 Dose:  Not Given


Haloperidol Lactate (Haldol)  5 mg IM Q8H PRN


   PRN Reason: Agitation


   Last Admin: 06/27/19 22:34 Dose:  5 mg


Sodium Chloride (Normal Saline)  1,000 mls @ 999 mls/hr IV .Bolus ONE


   Stop: 06/15/19 12:59


   Last Admin: 06/15/19 12:11 Dose:  999 mls/hr


Levofloxacin/Dextrose 500 mg/ (Premix)  100 mls @ 100 mls/hr IV ONETIME ONE


   Stop: 06/15/19 14:32


   Last Admin: 06/15/19 13:47 Dose:  100 mls/hr


Sodium Chloride (Normal Saline)  1,000 mls @ 75 mls/hr IV ASDIRECTED ZHAO


   Last Admin: 06/17/19 06:41 Dose:  75 mls/hr


Ceftriaxone Sodium/Dextrose 1 (gm/ Premix)  50 mls @ 100 mls/hr IV Q24H ZHAO


   Last Admin: 06/17/19 09:03 Dose:  100 mls/hr


Insulin Aspart (Novolog)  0 unit SUBCUT ACBREAKFASTANDBED ZHAO; Protocol


Lorazepam (Ativan)  2 mg IM ONETIME ONE


   Stop: 06/22/19 14:57


   Last Admin: 06/22/19 15:04 Dose:  2 mg


Lorazepam (Ativan)  2 mg IM Q6H PRN


   PRN Reason: Agitation


   Last Admin: 06/27/19 18:09 Dose:  2 mg


Oxycodone HCl (Oxycodone)  5 mg PO Q4H PRN


   PRN Reason: Pain (moderate 4-6)











- Exam


General: Reports: Alert, Oriented, Cooperative, No Acute Distress


Neck: Reports: Supple


Lungs: Reports: Clear to Auscultation, Normal Respiratory Effort


Cardiovascular: Reports: Regular Rate, Regular Rhythm


GI/Abdominal Exam: Normal Bowel Sounds, Soft, Non-Tender, No Distention


Extremities: Normal Inspection, Normal Range of Motion, Non-Tender, No Pedal 

Edema


Neurological: Reports: No New Focal Deficit


Psy/Mental Status: Reports: Alert, Normal Affect, Normal Mood.  Denies: Agitated

## 2019-07-18 ENCOUNTER — HOSPITAL ENCOUNTER (EMERGENCY)
Dept: HOSPITAL 56 - MW.ED | Age: 59
Discharge: OTHER INSTITUTION | End: 2019-07-18
Payer: MEDICARE

## 2019-07-18 VITALS — DIASTOLIC BLOOD PRESSURE: 90 MMHG | SYSTOLIC BLOOD PRESSURE: 166 MMHG

## 2019-07-18 DIAGNOSIS — Z88.8: ICD-10-CM

## 2019-07-18 DIAGNOSIS — Z02.89: Primary | ICD-10-CM

## 2019-07-18 DIAGNOSIS — Z88.0: ICD-10-CM

## 2019-07-18 DIAGNOSIS — Z79.4: ICD-10-CM

## 2019-07-18 DIAGNOSIS — Z79.899: ICD-10-CM

## 2019-07-18 DIAGNOSIS — E11.9: ICD-10-CM

## 2019-07-18 DIAGNOSIS — I10: ICD-10-CM

## 2019-07-18 LAB
APAP SERPL-MCNC: <2 UG/ML
CHLORIDE SERPL-SCNC: 102 MMOL/L (ref 98–107)
SODIUM SERPL-SCNC: 136 MMOL/L (ref 136–148)

## 2019-07-18 PROCEDURE — 87086 URINE CULTURE/COLONY COUNT: CPT

## 2019-07-18 PROCEDURE — 81001 URINALYSIS AUTO W/SCOPE: CPT

## 2019-07-18 PROCEDURE — 84443 ASSAY THYROID STIM HORMONE: CPT

## 2019-07-18 PROCEDURE — 36415 COLL VENOUS BLD VENIPUNCTURE: CPT

## 2019-07-18 PROCEDURE — 80053 COMPREHEN METABOLIC PANEL: CPT

## 2019-07-18 PROCEDURE — 96372 THER/PROPH/DIAG INJ SC/IM: CPT

## 2019-07-18 PROCEDURE — 85025 COMPLETE CBC W/AUTO DIFF WBC: CPT

## 2019-07-18 PROCEDURE — 80305 DRUG TEST PRSMV DIR OPT OBS: CPT

## 2019-07-18 PROCEDURE — G0480 DRUG TEST DEF 1-7 CLASSES: HCPCS

## 2019-07-18 PROCEDURE — 99283 EMERGENCY DEPT VISIT LOW MDM: CPT

## 2019-07-18 NOTE — EDM.PDOC
ED HPI GENERAL MEDICAL PROBLEM





- General


Chief Complaint: General


Stated Complaint: MEDICAL CLEARANCE


Time Seen by Provider: 07/18/19 19:29


Source of Information: Reports: Patient


History Limitations: Reports: No Limitations





- History of Present Illness


INITIAL COMMENTS - FREE TEXT/NARRATIVE: 


HISTORY AND PHYSICAL:





History of present illness:


Patient is a 59-year-old male who presents to the emergency room with law-

enforcement for medical clearance. Law enforcement has arranged for this 

patient to be seen in [place] for an involuntary committal. Present to the 

emergency room for medical clearance and standard lab work. Reportedly the 

patient had missed mandatory behavioral meetings and has not taken his 

medications. Patient denies any current thoughts of self-harm or homicidal 

thoughts. Past medical history of diabetes, hypertension and elevated 

cholesterol.





He states he is otherwise in good health and has no current complaints or 

concerns.





Review of systems: 


As per history of present illness and below otherwise all systems reviewed and 

negative.





Past medical history: 


As per history of present illness and as reviewed below otherwise 

noncontributory.





Surgical history: 


As per history of present illness and as reviewed below otherwise 

noncontributory.





Social history: 


See social history for further information





Family history: 


As per history of present illness and as reviewed below otherwise 

noncontributory.





Physical exam:


General: Well-developed and well-nourished 59-year-old male. Alert and 

oriented. Slightly unkempt, nontoxic appearing and in no acute distress. Vital 

signs are stable and have been reviewed by me. Currently in custody of law-

enforcement.


HEENT: Atraumatic, normocephalic, pupils equal and reactive bilaterally, 

negative for conjunctival pallor or scleral icterus, mucous membranes moist, 

trachea midline. No drooling or trismus noted. No meningeal signs. No hot 

potato voice noted. 


Lungs: Clear to auscultation, breath sounds equal bilaterally, chest nontender.


Heart: S1S2, regular rate and rhythm without overt murmur


Abdomen: Soft, nondistended, nontender. Negative for masses. Negative for 

costovertebral tenderness.


Pelvis: Stable nontender.


Skin: Intact, warm, dry. No lesions or rashes noted.


Extremities: Atraumatic, moves all extremities per self without difficulty or 

deficits, negative for cords or calf pain. Neurovascular unremarkable.


Neuro: Awake, alert, oriented. Cranial nerves II through XII unremarkable. 

Cerebellum unremarkable. Motor and sensory unremarkable throughout. Exam 

nonfocal.





Notes:


Patient is agreeable to diagnostics. His vital signs are stable. Law 

enforcement does have an emergency committal papers filled out, they will be 

transporting patient to [place] in [place]. Dr Raygoza (psychologist) will 

review this paperwork for law enforcement.





Lab work is unremarkable. Vital signs remain stable. Patient will be 

transported via law enforcement by their services. Supportive care measures 

were reviewed and discussed. Voices understanding and is agreeable to plan of 

care. Neither patient or law enforcement have any further questions or concerns 

at this time. 





Diagnostics:


CBC, CMP, TSH, UA, urine drug screen, acetaminophen, salicylate, ETOH





Therapeutics:


None





Prescription:


None





Impression: 


Encounter for medical screening exam





- Related Data


 Allergies











Allergy/AdvReac Type Severity Reaction Status Date / Time


 


acetaminophen [From Tylenol] Allergy  Facial Verified 07/18/19 19:41





   Swelling  


 


naproxen [From Aleve] Allergy  Cannot Verified 07/18/19 19:41





   Remember  


 


Penicillins Allergy  Facial Verified 07/18/19 19:41





   Swelling  











Home Meds: 


 Home Meds





metFORMIN [Glucophage] 1,000 mg PO BID 03/17/17 [History]


Indapamide 2.5 mg PO DAILY 06/15/19 [History]


Insuln Asp Prot/Insulin Aspart [NovoLOG Mix 70-30] 64 units SQ BID 06/15/19 [

History]


Liraglutide [Victoza] 1.8 mg SQ BEDTIME 06/15/19 [History]


Losartan [Cozaar] 100 mg PO DAILY 06/15/19 [History]


atorvaSTATin [Lipitor] 20 mg PO DAILY 06/17/19 [History]











Past Medical History


HEENT History: Reports: None


Cardiovascular History: Reports: Hypertension


Respiratory History: Reports: None


Gastrointestinal History: Reports: None


Genitourinary History: Reports: None


Musculoskeletal History: Reports: None


Neurological History: Reports: None


Psychiatric History: Reports: None


Endocrine/Metabolic History: Reports: Diabetes, Type II


Hematologic History: Reports: None


Immunologic History: Reports: None


Oncologic (Cancer) History: Reports: None


Dermatologic History: Reports: None





- Infectious Disease History


Infectious Disease History: Reports: Chicken Pox, Measles, Mumps





- Past Surgical History


Head Surgeries/Procedures: Reports: None


HEENT Surgical History: Reports: None


Cardiovascular Surgical History: Reports: None


Respiratory Surgical History: Reports: None


GI Surgical History: Reports: None


Male  Surgical History: Reports: None


Endocrine Surgical History: Reports: None


Neurological Surgical History: Reports: None


Other Musculoskeletal Surgeries/Procedures:: ORIF of Right Foot. Brace in place


Oncologic Surgical History: Reports: None


Dermatological Surgical History: Reports: None





Social & Family History





- Family History


Family Medical History: Noncontributory





- Caffeine Use


Caffeine Use: Reports: Soda





- Living Situation & Occupation


Living situation: Reports: with Family, Single


Occupation: Disabled





ED ROS GENERAL





- Review of Systems


Review Of Systems: ROS reveals no pertinent complaints other than HPI.





ED EXAM, GENERAL





- Physical Exam


Exam: See Below (See dictation)





Course





- Vital Signs


Last Recorded V/S: 


 Last Vital Signs











Temp  98.2 F   07/18/19 19:41


 


Pulse  101 H  07/18/19 19:41


 


Resp  16   07/18/19 19:41


 


BP  143/91 H  07/18/19 19:41


 


Pulse Ox  18 L  07/18/19 19:41














- Orders/Labs/Meds


Orders: 


 Active Orders 24 hr











 Category Date Time Status


 


 CULTURE URINE [RM] Stat Lab  07/18/19 19:45 Received


 


 Azithromycin [Zithromax] Med  07/18/19 21:00 Active





 1,000 mg PO Q24H   








 Medication Orders





Azithromycin (Zithromax)  1,000 mg PO Q24H ZHAO


   Last Admin: 07/18/19 21:04  Dose: 1,000 mg








Labs: 


 Laboratory Tests











  07/18/19 07/18/19 07/18/19 Range/Units





  19:45 19:45 19:52 


 


WBC    7.90  (4.0-11.0)  K/uL


 


RBC    4.03 L  (4.50-5.90)  M/uL


 


Hgb    12.1 L  (13.0-17.0)  g/dL


 


Hct    38.2  (38.0-50.0)  %


 


MCV    94.8  (80.0-98.0)  fL


 


MCH    30.0  (27.0-32.0)  pg


 


MCHC    31.7  (31.0-37.0)  g/dL


 


RDW Std Deviation    48.2  (28.0-62.0)  fl


 


RDW Coeff of Arvind    14  (11.0-15.0)  %


 


Plt Count    224  (150-400)  K/uL


 


MPV    10.00  (7.40-12.00)  fL


 


Neut % (Auto)    71.2  (48.0-80.0)  %


 


Lymph % (Auto)    15.7 L  (16.0-40.0)  %


 


Mono % (Auto)    10.3  (0.0-15.0)  %


 


Eos % (Auto)    2.3  (0.0-7.0)  %


 


Baso % (Auto)    0.5  (0.0-1.5)  %


 


Neut # (Auto)    5.6  (1.4-5.7)  K/uL


 


Lymph # (Auto)    1.2  (0.6-2.4)  K/uL


 


Mono # (Auto)    0.8  (0.0-0.8)  K/uL


 


Eos # (Auto)    0.2  (0.0-0.7)  K/uL


 


Baso # (Auto)    0.0  (0.0-0.1)  K/uL


 


Nucleated RBC %    0.0  /100WBC


 


Nucleated RBCs #    0  K/uL


 


Sodium     (136-148)  mmol/L


 


Potassium     (3.5-5.1)  mmol/L


 


Chloride     ()  mmol/L


 


Carbon Dioxide     (21.0-32.0)  mmol/L


 


BUN     (7.0-18.0)  mg/dL


 


Creatinine     (0.8-1.3)  mg/dL


 


Est Cr Clr Drug Dosing     mL/min


 


Estimated GFR (MDRD)     ml/min


 


Glucose     ()  mg/dL


 


Calcium     (8.5-10.1)  mg/dL


 


Total Bilirubin     (0.2-1.0)  mg/dL


 


AST     (15-37)  IU/L


 


ALT     (14-63)  IU/L


 


Alkaline Phosphatase     ()  U/L


 


Total Protein     (6.4-8.2)  g/dL


 


Albumin     (3.4-5.0)  g/dL


 


Globulin     (2.6-4.0)  g/dL


 


Albumin/Globulin Ratio     (0.9-1.6)  


 


TSH 3rd Generation     (0.36-3.74)  uIU/mL


 


Urine Color  YELLOW    


 


Urine Appearance  CLOUDY    


 


Urine pH  5.5    (5.0-8.0)  


 


Ur Specific Gravity  >= 1.030    (1.001-1.035)  


 


Urine Protein  100 H    (NEGATIVE)  mg/dL


 


Urine Glucose (UA)  100 H    (NEGATIVE)  mg/dL


 


Urine Ketones  NEGATIVE    (NEGATIVE)  mg/dL


 


Urine Occult Blood  LARGE H    (NEGATIVE)  


 


Urine Nitrite  POSITIVE H    (NEGATIVE)  


 


Urine Bilirubin  NEGATIVE    (NEGATIVE)  


 


Urine Urobilinogen  0.2    (<2.0)  EU/dL


 


Ur Leukocyte Esterase  NEGATIVE    (NEGATIVE)  


 


Urine RBC  3-5    (0-2/HPF)  


 


Urine WBC  4-6    (0-5/HPF)  


 


Ur Epithelial Cells  FEW    (NONE-FEW)  


 


Urine Bacteria  1+ H    (NEGATIVE)  


 


Urine Mucus  HEAVY    (NONE-MOD)  


 


Salicylates     (0-20)  mg/dL


 


Urine Opiates Screen   NEGATIVE   (NEGATIVE)  


 


Ur Oxycodone Screen   NEGATIVE   (NEGATIVE)  


 


Urine Methadone Screen   NEGATIVE   (NEGATIVE)  


 


Acetaminophen     ug/mL


 


Ur Barbiturates Screen   NEGATIVE   (NEGATIVE)  


 


Ur Phencyclidine Scrn   NEGATIVE   (NEGATIVE)  


 


Ur Amphetamine Screen   NEGATIVE   (NEGATIVE)  


 


U Methamphetamines Scrn   NEGATIVE   (NEGATIVE)  


 


U Benzodiazepines Scrn   NEGATIVE   (NEGATIVE)  


 


U Cocaine Metab Screen   NEGATIVE   (NEGATIVE)  


 


U Marijuana (THC) Screen   NEGATIVE   (NEGATIVE)  


 


Ethyl Alcohol     mg/dL














  07/18/19 Range/Units





  19:52 


 


WBC   (4.0-11.0)  K/uL


 


RBC   (4.50-5.90)  M/uL


 


Hgb   (13.0-17.0)  g/dL


 


Hct   (38.0-50.0)  %


 


MCV   (80.0-98.0)  fL


 


MCH   (27.0-32.0)  pg


 


MCHC   (31.0-37.0)  g/dL


 


RDW Std Deviation   (28.0-62.0)  fl


 


RDW Coeff of Arvind   (11.0-15.0)  %


 


Plt Count   (150-400)  K/uL


 


MPV   (7.40-12.00)  fL


 


Neut % (Auto)   (48.0-80.0)  %


 


Lymph % (Auto)   (16.0-40.0)  %


 


Mono % (Auto)   (0.0-15.0)  %


 


Eos % (Auto)   (0.0-7.0)  %


 


Baso % (Auto)   (0.0-1.5)  %


 


Neut # (Auto)   (1.4-5.7)  K/uL


 


Lymph # (Auto)   (0.6-2.4)  K/uL


 


Mono # (Auto)   (0.0-0.8)  K/uL


 


Eos # (Auto)   (0.0-0.7)  K/uL


 


Baso # (Auto)   (0.0-0.1)  K/uL


 


Nucleated RBC %   /100WBC


 


Nucleated RBCs #   K/uL


 


Sodium  136  (136-148)  mmol/L


 


Potassium  3.7  (3.5-5.1)  mmol/L


 


Chloride  102  ()  mmol/L


 


Carbon Dioxide  25.7  (21.0-32.0)  mmol/L


 


BUN  23 H  (7.0-18.0)  mg/dL


 


Creatinine  0.8  (0.8-1.3)  mg/dL


 


Est Cr Clr Drug Dosing  96.19  mL/min


 


Estimated GFR (MDRD)  > 60.0  ml/min


 


Glucose  209 H  ()  mg/dL


 


Calcium  8.9  (8.5-10.1)  mg/dL


 


Total Bilirubin  0.7  (0.2-1.0)  mg/dL


 


AST  36  (15-37)  IU/L


 


ALT  38  (14-63)  IU/L


 


Alkaline Phosphatase  75  ()  U/L


 


Total Protein  7.4  (6.4-8.2)  g/dL


 


Albumin  3.4  (3.4-5.0)  g/dL


 


Globulin  4.0  (2.6-4.0)  g/dL


 


Albumin/Globulin Ratio  0.9  (0.9-1.6)  


 


TSH 3rd Generation  1.60  (0.36-3.74)  uIU/mL


 


Urine Color   


 


Urine Appearance   


 


Urine pH   (5.0-8.0)  


 


Ur Specific Gravity   (1.001-1.035)  


 


Urine Protein   (NEGATIVE)  mg/dL


 


Urine Glucose (UA)   (NEGATIVE)  mg/dL


 


Urine Ketones   (NEGATIVE)  mg/dL


 


Urine Occult Blood   (NEGATIVE)  


 


Urine Nitrite   (NEGATIVE)  


 


Urine Bilirubin   (NEGATIVE)  


 


Urine Urobilinogen   (<2.0)  EU/dL


 


Ur Leukocyte Esterase   (NEGATIVE)  


 


Urine RBC   (0-2/HPF)  


 


Urine WBC   (0-5/HPF)  


 


Ur Epithelial Cells   (NONE-FEW)  


 


Urine Bacteria   (NEGATIVE)  


 


Urine Mucus   (NONE-MOD)  


 


Salicylates  0.9  (0-20)  mg/dL


 


Urine Opiates Screen   (NEGATIVE)  


 


Ur Oxycodone Screen   (NEGATIVE)  


 


Urine Methadone Screen   (NEGATIVE)  


 


Acetaminophen  <2.0  ug/mL


 


Ur Barbiturates Screen   (NEGATIVE)  


 


Ur Phencyclidine Scrn   (NEGATIVE)  


 


Ur Amphetamine Screen   (NEGATIVE)  


 


U Methamphetamines Scrn   (NEGATIVE)  


 


U Benzodiazepines Scrn   (NEGATIVE)  


 


U Cocaine Metab Screen   (NEGATIVE)  


 


U Marijuana (THC) Screen   (NEGATIVE)  


 


Ethyl Alcohol  < 3.0  mg/dL











Meds: 


Medications











Generic Name Dose Route Start Last Admin





  Trade Name Freq  PRN Reason Stop Dose Admin


 


Azithromycin  1,000 mg  07/18/19 21:00  07/18/19 21:04





  Zithromax  PO   1,000 mg





  Q24H ZHAO   Administration





     





     





     





     














Discontinued Medications














Generic Name Dose Route Start Last Admin





  Trade Name Conner  PRN Reason Stop Dose Admin


 


Ceftriaxone Sodium 250 mg/  1 mls @ 1 mls/sec  07/18/19 20:56  07/18/19 21:05





  Lidocaine HCl  IM  07/18/19 20:57  1 mls/sec





  ONETIME ONE   Administration





     





     





     





     


 


Lidocaine HCl  2 ml  07/18/19 20:56  07/18/19 21:07





  Xylocaine-Mpf 1%  INJECT  07/18/19 20:57  Not Given





  ONETIME ONE   





     





     





     





     














Departure





- Departure


Time of Disposition: 21:16


Disposition: DC/Tfer to Other 70


Clinical Impression: 


 Encounter for medical screening examination








- Discharge Information


Referrals: 


PCP,None [Primary Care Provider] - 


Forms:  ED Department Discharge


Additional Instructions: 


The following information is given to patients seen in the emergency department 

who are being discharged to home. This information is to outline your options 

for follow-up care. We provide all patients seen in our emergency department 

with a follow-up referral.





The need for follow-up, as well as the timing and circumstances, are variable 

depending upon the specifics of your emergency department visit.





If you don't have a primary care physician on staff, we will provide you with a 

referral. We always advise you to contact your personal physician following an 

emergency department visit to inform them of the circumstance of the visit and 

for follow-up with them and/or the need for any referrals to a consulting 

specialist.





The emergency department will also refer you to a specialist when appropriate. 

This referral assures that you have the opportunity for follow-up care with a 

specialist. All of these measure are taken in an effort to provide you with 

optimal care, which includes your follow-up.





Under all circumstances we always encourage you to contact your private 

physician who remains a resource for coordinating your care. When calling for 

follow-up care, please make the office aware that this follow-up is from your 

recent emergency room visit. If for any reason you are refused follow-up, 

please contact the [facility] Emergency 

Department at [phone] and asked to speak to the emergency department 

charge nurse.





[facility]


Primary Care


[address] [place] [id number]


Phone: [phone]


Fax: [phone]





[address] [address]


Phone: [phone]


Fax: [phone]











- My Orders


Last 24 Hours: 


My Active Orders





07/18/19 19:45


CULTURE URINE [RM] Stat 





07/18/19 21:00


Azithromycin [Zithromax]   1,000 mg PO Q24H 














- Assessment/Plan


Last 24 Hours: 


My Active Orders





07/18/19 19:45


CULTURE URINE [RM] Stat 





07/18/19 21:00


Azithromycin [Zithromax]   1,000 mg PO Q24H

## 2020-10-07 ENCOUNTER — HOSPITAL ENCOUNTER (EMERGENCY)
Dept: HOSPITAL 41 - JD.ED | Age: 60
Discharge: HOME | End: 2020-10-07
Payer: MEDICARE

## 2020-10-07 VITALS — HEART RATE: 98 BPM | SYSTOLIC BLOOD PRESSURE: 164 MMHG | DIASTOLIC BLOOD PRESSURE: 84 MMHG

## 2020-10-07 DIAGNOSIS — Z88.0: ICD-10-CM

## 2020-10-07 DIAGNOSIS — Z79.899: ICD-10-CM

## 2020-10-07 DIAGNOSIS — Z88.6: ICD-10-CM

## 2020-10-07 DIAGNOSIS — Z79.82: ICD-10-CM

## 2020-10-07 DIAGNOSIS — Z79.4: ICD-10-CM

## 2020-10-07 DIAGNOSIS — F03.90: ICD-10-CM

## 2020-10-07 DIAGNOSIS — Z20.828: ICD-10-CM

## 2020-10-07 DIAGNOSIS — I10: ICD-10-CM

## 2020-10-07 DIAGNOSIS — E11.9: ICD-10-CM

## 2020-10-07 DIAGNOSIS — N39.0: Primary | ICD-10-CM

## 2020-10-07 PROCEDURE — 36415 COLL VENOUS BLD VENIPUNCTURE: CPT

## 2020-10-07 PROCEDURE — 71045 X-RAY EXAM CHEST 1 VIEW: CPT

## 2020-10-07 PROCEDURE — 96366 THER/PROPH/DIAG IV INF ADDON: CPT

## 2020-10-07 PROCEDURE — 87088 URINE BACTERIA CULTURE: CPT

## 2020-10-07 PROCEDURE — 82553 CREATINE MB FRACTION: CPT

## 2020-10-07 PROCEDURE — 87181 SC STD AGAR DILUTION PER AGT: CPT

## 2020-10-07 PROCEDURE — 87086 URINE CULTURE/COLONY COUNT: CPT

## 2020-10-07 PROCEDURE — U0002 COVID-19 LAB TEST NON-CDC: HCPCS

## 2020-10-07 PROCEDURE — 85610 PROTHROMBIN TIME: CPT

## 2020-10-07 PROCEDURE — 81001 URINALYSIS AUTO W/SCOPE: CPT

## 2020-10-07 PROCEDURE — 80053 COMPREHEN METABOLIC PANEL: CPT

## 2020-10-07 PROCEDURE — 82962 GLUCOSE BLOOD TEST: CPT

## 2020-10-07 PROCEDURE — 99285 EMERGENCY DEPT VISIT HI MDM: CPT

## 2020-10-07 PROCEDURE — 84484 ASSAY OF TROPONIN QUANT: CPT

## 2020-10-07 PROCEDURE — 87184 SC STD DISK METHOD PER PLATE: CPT

## 2020-10-07 PROCEDURE — 83880 ASSAY OF NATRIURETIC PEPTIDE: CPT

## 2020-10-07 PROCEDURE — 85730 THROMBOPLASTIN TIME PARTIAL: CPT

## 2020-10-07 PROCEDURE — 86140 C-REACTIVE PROTEIN: CPT

## 2020-10-07 PROCEDURE — 83735 ASSAY OF MAGNESIUM: CPT

## 2020-10-07 PROCEDURE — 96365 THER/PROPH/DIAG IV INF INIT: CPT

## 2020-10-07 PROCEDURE — 96367 TX/PROPH/DG ADDL SEQ IV INF: CPT

## 2020-10-07 PROCEDURE — 85025 COMPLETE CBC W/AUTO DIFF WBC: CPT

## 2020-10-07 NOTE — EDM.PDOC
ED HPI GENERAL MEDICAL PROBLEM





- General


Chief Complaint: Chest Pain


Stated Complaint: [facility]


Time Seen by Provider: 10/07/20 14:30


Source of Information: Reports: Patient (Very little history was obtained from 

the patient is all the answers were yes.), EMS, Nursing Home Records


History Limitations: Reports: Altered Mental Status (And appears to have 

underlying dementia.)





- History of Present Illness


INITIAL COMMENTS - FREE TEXT/NARRATIVE: 





History suggest that approximately 35 minutes before arriving in the ED he 

developed or complained of central chest pain left precordial chest pain rating 

up into his neck and left upper extremity.  The history was provided primarily 

by the nursing staff at the nursing home not by the patient I could not get this

history from the patient at this time.  He still states that he still have some 

central chest pain.  There is no history to suggest fever chills cough.  

Paramedics appreciated that he was diaphoretic particular with beads of 

perspiration on his forehead.  ECG done by triage staff reveals sinus rhythm at 

96/min with a first-degree AV block and minimally prolonged QTc interval but no 

signs of ischemia.


Onset: Today, Sudden


Onset Date: 10/07/20


Onset Time: 13:45


Duration: Minutes:, Other (Patient claims he still has discomfort on 

interrogation.  However he claims that he has pain in his neck pain in his arm 

pain in his back as well.)


Location: Reports: Chest (Reportedly complains of central chest pain radiation 

to his left upper extremity.)


Quality: Reports: Ache


Severity: Moderate


Improves with: Reports: None


Worsens with: Reports: None


Context: Denies: Activity, Exercise, Sick Contact, Trauma, Other


Associated Symptoms: Reports: Chest Pain, Diaphoresis.  Denies: No Other 

Symptoms, Confusion, Cough, cough w sputum, Fever/Chills, Headaches (Beads of 

sweat on his forehead.), Loss of Appetite, Malaise, Rash, Seizure, Shortness of 

Breath, Syncope


Treatments PTA: Reports: IV/IO, Other (see below) (He has received no 

medications.)





- Related Data


                                    Allergies











Allergy/AdvReac Type Severity Reaction Status Date / Time


 


acetaminophen [From Tylenol] Allergy Severe Facial Verified 10/07/20 14:36





   Swelling  


 


Penicillins Allergy Severe Facial Verified 10/07/20 14:36





   Swelling  


 


naproxen [From Aleve] Allergy Mild Cannot Verified 10/07/20 17:03





   Remember  











Home Meds: 


                                    Home Meds





metFORMIN [Glucophage] 1,000 mg PO BID 03/17/17 [History]


Losartan [Cozaar] 100 mg PO DAILY 06/15/19 [History]


atorvaSTATin [Lipitor] 20 mg PO BEDTIME 06/17/19 [History]


Aspirin [Adult Low Dose Aspirin EC] 81 mg PO DAILY 10/07/20 [History]


Calcium Carbonate [Tums] 500 mg PO BID 10/07/20 [History]


Lingle/Min Oil/Kiana/Wool Alcoh [Eucerin Creme] 1 applic TOP BID 10/07/20 

[History]


Cholecalciferol (Vitamin D3) [Vitamin D3] 1,000 unit PO DAILY 10/07/20 [History]


Furosemide [Lasix] 20 mg PO DAILY 10/07/20 [History]


Ibuprofen 600 mg PO Q6HR PRN 10/07/20 [History]


Insulin Aspart [NovoLOG] 3 unit SQ TIDMEALS 10/07/20 [History]


Insulin Detemir [Levemir] 20 unit SUBCUT DAILY 10/07/20 [History]


LORazepam [Ativan] 0.5 mg PO BID 10/07/20 [History]


Magnesium Hydroxide [Milk of Magnesia] 30 ml PO BEDTIME 10/07/20 [History]


Melatonin 9 mg PO BEDTIME 10/07/20 [History]


Mirtazapine [Remeron] 7.5 mg PO BEDTIME 10/07/20 [History]


Multivit-Min/FA/Lycopen/Lutein [Certavite Sr with Lutein Tab] 1 each PO DAILY 

10/07/20 [History]


Pyrithione Zinc [Selsun Blue] 1 applic TP DAILY 10/07/20 [History]


bisacodyL [Dulcolax] 5 mg PO BID 10/07/20 [History]


levoFLOXacin [Levaquin] 500 mg PO DAILY #9 tab 10/07/20 [Rx]


polyethylene glycoL 3350 [MiraLAX] 17 gm PO DAILY PRN 10/07/20 [History]


risperiDONE [Risperdal] 3 mg PO BEDTIME 10/07/20 [History]











Past Medical History


HEENT History: Reports: None


Cardiovascular History: Reports: Hypertension


Respiratory History: Reports: None


Gastrointestinal History: Reports: None


Genitourinary History: Reports: None


Musculoskeletal History: Reports: None


Neurological History: Reports: None


Psychiatric History: Reports: None


Endocrine/Metabolic History: Reports: Diabetes, Type II (Trolled with Victoza, 

insulin and metformin and diet.)


Hematologic History: Reports: None


Immunologic History: Reports: None


Oncologic (Cancer) History: Reports: None


Dermatologic History: Reports: None





- Infectious Disease History


Infectious Disease History: Reports: Chicken Pox, Measles, Mumps





- Past Surgical History


Head Surgeries/Procedures: Reports: None


HEENT Surgical History: Reports: None


Cardiovascular Surgical History: Reports: None


Respiratory Surgical History: Reports: None


GI Surgical History: Reports: None


Male  Surgical History: Reports: None


Endocrine Surgical History: Reports: None


Neurological Surgical History: Reports: None


Other Musculoskeletal Surgeries/Procedures:: ORIF of Right Foot. Brace in place


Oncologic Surgical History: Reports: None


Dermatological Surgical History: Reports: None





Social & Family History





- Family History


Family Medical History: Noncontributory





- Caffeine Use


Caffeine Use: Reports: Soda





- Living Situation & Occupation


Living situation: Reports: with Family, Single


Occupation: Disabled





ED ROS GENERAL





- Review of Systems


Review Of Systems: Unable To Obtain


Reason Not Obtained: History obtained from the patient is is a very poor quality

 he is


Constitutional: Reports: Malaise, Weakness, Fatigue, Decreased Appetite.  

Denies: Fever, Chills, Weight Loss


HEENT: Reports: No Symptoms


Respiratory: Reports: Shortness of Breath.  Denies: Wheezing, Pleuritic Chest 

Pain, Cough


Cardiovascular: Reports: Chest Pain, Blood Pressure Problem.  Denies: 

Claudication (See history of present illness), Dyspnea on Exertion, Edema, 

Lightheadedness, Orthopnea


Endocrine: Reports: Fatigue


GI/Abdominal: Reports: Constipation


: Reports: Frequency


Musculoskeletal: Reports: Back Pain, Joint Pain (Knees hips shoulders at times)


Skin: Reports: Bruising (Injections of Victoza and insulin)


Neurological: Reports: Confusion (Apparently has a history of dementia.)


Psychiatric: Reports: Anxiety, Depression


Hematologic/Lymphatic: Reports: No Symptoms


Immunologic: Reports: No Symptoms





ED EXAM, GENERAL





- Physical Exam


Exam: See Below


Exam Limited By: Altered Mental Status (Patient appears confused and suffering 

from dementia.  He does not answer any questions with any degree of reliability.

  Answers yes to almost all questions.)


General Appearance: Alert, WD/WN, No Apparent Distress, Other (Mildly pallid but

 is diaphoretic on his forehead.  No one has checked his blood sugar yet and he 

has a type II diabetic treated with Victoza and insulin and metformin.)


Eye Exam: Bilateral Eye: Normal Inspection, PERRL


Throat/Mouth: Normal Inspection, Normal Lips (1 is mildly dry and coated), 

Normal Oropharynx, Other.  No: Normal Teeth


Head: Atraumatic, Normocephalic


Neck: Normal Inspection, Supple, Non-Tender, Full Range of Motion.  No: Carotid 

Bruit, Lymphadenopathy (L)


Respiratory/Chest: No Respiratory Distress, No Accessory Muscle Use, Decreased 

Breath Sounds (Decreased air entry lower 30% of lung fields bilaterally.  Mild 

dullness to percussion on both lower lobes suggesting perhaps mild pleural 

effusions.), Rales.  No: Respiratory Distress, Rhonchi, Wheezing (Data rales 

both lung bases.)


Cardiovascular: Regular Rate, Rhythm, No Gallop, No Murmur, No Rub.  No: Normal 

Peripheral Pulses, No Edema


Peripheral Pulses: 1+: Posterior Tibial (L) (Extremity pulses are limited by), 

Posterior Tibial (R), Dorsalis Pedis (L), Dorsalis Pedis (R), 2+: Carotid (L), 

Carotid (R)


GI/Abdominal: Normal Bowel Sounds, Soft, Non-Tender, No Organomegaly, No 

Abnormal Bruit, No Mass, Pelvis Stable


Back Exam: Normal Inspection, Full Range of Motion, Other (No surgical scars 

abrasions or contusions.).  No: CVA Tenderness (L), CVA Tenderness (R)


Extremities: Pedal Edema (He has 4+ pitting edema up past the knees bilaterally.

  Right leg is slightly worse than the left.), Other (Evidence of osteoarthritic

 changes in both knees both hips).  No: Normal Range of Motion


Neurological: CN II-XII Intact, No Motor/Sensory Deficits.  No: Oriented, Normal

 Cognition


Psychiatric: Flat Affect (Was all limbs.)


Skin Exam: Warm, Dry, Intact, Pallor, Other (No pallor.  Has beads of sweat on 

his forehead.)





EKG INTERPRETATION


EKG Date: 10/07/20


Time: 14:34


Rhythm: NSR


Rate (Beats/Min): 96


Axis: Normal


P-Wave: Present (With first-degree AV block)


QT: Prolonged (Minimally prolonged)


EKG Interpretation Comments: 





Borderline ECG.  No signs of ischemia





Course





- Vital Signs


Last Recorded V/S: 


                                Last Vital Signs











Temp  36.6 C   10/07/20 14:31


 


Pulse  98   10/07/20 14:31


 


Resp  14   10/07/20 14:31


 


BP  164/84 H  10/07/20 14:31


 


Pulse Ox  98   10/07/20 14:31














- Orders/Labs/Meds


Orders: 


                               Active Orders 24 hr











 Category Date Time Status


 


 Blood Glucose Check, Bedside [RC] ONETIME Care  10/07/20 14:47 Active


 


 Oxygen Therapy [RC] ASDIRECTED Care  10/07/20 14:41 Active


 


 Chest 1V Frontal [CR] Stat Exams  10/07/20 14:40 Taken


 


 CULTURE URINE [RM] Routine Lab  10/07/20 15:10 Received


 


 Sodium Chloride 0.9% [Normal Saline] 1,000 ml Med  10/07/20 14:45 Active





 IV ASDIRECTED   








                                Medication Orders





Sodium Chloride (Normal Saline)  1,000 mls @ 125 mls/hr IV ASDIRECTED LEXY


   Last Admin: 10/07/20 14:56  Dose: 125 mls/hr


   Documented by: TAQUERIA








Labs: 


                                Laboratory Tests











  10/07/20 10/07/20 10/07/20 Range/Units





  15:01 15:05 15:05 


 


WBC   6.98   (4.23-9.07)  K/mm3


 


RBC   4.27 L   (4.63-6.08)  M/mm3


 


Hgb   12.7 L   (13.7-17.5)  gm/dl


 


Hct   40.2   (40.1-51.0)  %


 


MCV   94.1 H   (79.0-92.2)  fl


 


MCH   29.7   (25.7-32.2)  pg


 


MCHC   31.6 L   (32.2-35.5)  g/dl


 


RDW Std Deviation   44.3 H   (35.1-43.9)  fL


 


Plt Count   223   (163-337)  K/mm3


 


MPV   9.9   (9.4-12.3)  fl


 


Neut % (Auto)   76.4 H   (34.0-67.9)  %


 


Lymph % (Auto)   14.3 L   (21.8-53.1)  %


 


Mono % (Auto)   7.3   (5.3-12.2)  %


 


Eos % (Auto)   1.4   (0.8-7.0)  


 


Baso % (Auto)   0.3   (0.1-1.2)  %


 


Neut # (Auto)   5.33   (1.78-5.38)  K/mm3


 


Lymph # (Auto)   1.00 L   (1.32-3.57)  K/mm3


 


Mono # (Auto)   0.51   (0.30-0.82)  K/mm3


 


Eos # (Auto)   0.10   (0.04-0.54)  K/mm3


 


Baso # (Auto)   0.02   (0.01-0.08)  K/mm3


 


PT    11.1  (9.7-11.7)  SECONDS


 


INR    1.04  


 


APTT    25  (22-31)  SECONDS


 


Sodium     (136-145)  mEq/L


 


Potassium     (3.5-5.1)  mEq/L


 


Chloride     ()  mEq/L


 


Carbon Dioxide     (21-32)  mEq/L


 


Anion Gap     (5-15)  


 


BUN     (7-18)  mg/dL


 


Creatinine     (0.7-1.3)  mg/dL


 


Est Cr Clr Drug Dosing     mL/min


 


Estimated GFR (MDRD)     (>60)  mL/min


 


BUN/Creatinine Ratio     (14-18)  


 


Glucose     ()  mg/dL


 


POC Glucose  166 H    ()  mg/dL


 


Calcium     (8.5-10.1)  mg/dL


 


Magnesium     (1.8-2.4)  mg/dl


 


Total Bilirubin     (0.2-1.0)  mg/dL


 


AST     (15-37)  U/L


 


ALT     (16-63)  U/L


 


Alkaline Phosphatase     ()  U/L


 


CK-MB (CK-2)     (0-3.6)  ng/ml


 


Troponin I     (0.00-0.056)  ng/mL


 


C-Reactive Protein     (<1.0)  mg/dL


 


NT-Pro-B Natriuret Pep     (0-125)  pg/mL


 


Total Protein     (6.4-8.2)  g/dl


 


Albumin     (3.4-5.0)  g/dl


 


Globulin     gm/dL


 


Albumin/Globulin Ratio     (1-2)  


 


Urine Color     (Yellow)  


 


Urine Appearance     (Clear)  


 


Urine pH     (5.0-8.0)  


 


Ur Specific Gravity     (1.005-1.030)  


 


Urine Protein     (Negative)  


 


Urine Glucose (UA)     (Negative)  


 


Urine Ketones     (Negative)  


 


Urine Occult Blood     (Negative)  


 


Urine Nitrite     (Negative)  


 


Urine Bilirubin     (Negative)  


 


Urine Urobilinogen     (0.2-1.0)  


 


Ur Leukocyte Esterase     (Negative)  


 


Urine RBC     (0-5)  /hpf


 


Urine WBC     (0-5)  /hpf


 


Urine WBC Clumps     (NOT SEEN)  /hpf


 


Ur Squamous Epith Cells     (0-5)  /hpf


 


Urine Bacteria     (FEW)  /hpf


 


Urine Mucus     (FEW)  /hpf


 


SARS-CoV-2 RNA (LONG)     (NEGATIVE)  














  10/07/20 10/07/20 10/07/20 Range/Units





  15:05 15:05 15:12 


 


WBC     (4.23-9.07)  K/mm3


 


RBC     (4.63-6.08)  M/mm3


 


Hgb     (13.7-17.5)  gm/dl


 


Hct     (40.1-51.0)  %


 


MCV     (79.0-92.2)  fl


 


MCH     (25.7-32.2)  pg


 


MCHC     (32.2-35.5)  g/dl


 


RDW Std Deviation     (35.1-43.9)  fL


 


Plt Count     (163-337)  K/mm3


 


MPV     (9.4-12.3)  fl


 


Neut % (Auto)     (34.0-67.9)  %


 


Lymph % (Auto)     (21.8-53.1)  %


 


Mono % (Auto)     (5.3-12.2)  %


 


Eos % (Auto)     (0.8-7.0)  


 


Baso % (Auto)     (0.1-1.2)  %


 


Neut # (Auto)     (1.78-5.38)  K/mm3


 


Lymph # (Auto)     (1.32-3.57)  K/mm3


 


Mono # (Auto)     (0.30-0.82)  K/mm3


 


Eos # (Auto)     (0.04-0.54)  K/mm3


 


Baso # (Auto)     (0.01-0.08)  K/mm3


 


PT     (9.7-11.7)  SECONDS


 


INR     


 


APTT     (22-31)  SECONDS


 


Sodium  139    (136-145)  mEq/L


 


Potassium  3.9    (3.5-5.1)  mEq/L


 


Chloride  100    ()  mEq/L


 


Carbon Dioxide  27    (21-32)  mEq/L


 


Anion Gap  15.9 H    (5-15)  


 


BUN  22 H    (7-18)  mg/dL


 


Creatinine  0.9    (0.7-1.3)  mg/dL


 


Est Cr Clr Drug Dosing  95.80    mL/min


 


Estimated GFR (MDRD)  > 60    (>60)  mL/min


 


BUN/Creatinine Ratio  24.4 H    (14-18)  


 


Glucose  192 H    ()  mg/dL


 


POC Glucose     ()  mg/dL


 


Calcium  8.6    (8.5-10.1)  mg/dL


 


Magnesium  1.9    (1.8-2.4)  mg/dl


 


Total Bilirubin  0.4    (0.2-1.0)  mg/dL


 


AST  16    (15-37)  U/L


 


ALT  27    (16-63)  U/L


 


Alkaline Phosphatase  77    ()  U/L


 


CK-MB (CK-2)  1.2    (0-3.6)  ng/ml


 


Troponin I  < 0.017    (0.00-0.056)  ng/mL


 


C-Reactive Protein  < 0.2    (<1.0)  mg/dL


 


NT-Pro-B Natriuret Pep   59   (0-125)  pg/mL


 


Total Protein  7.8    (6.4-8.2)  g/dl


 


Albumin  3.7    (3.4-5.0)  g/dl


 


Globulin  4.1    gm/dL


 


Albumin/Globulin Ratio  0.9 L    (1-2)  


 


Urine Color    Yellow  (Yellow)  


 


Urine Appearance    Cloudy H  (Clear)  


 


Urine pH    7.5  (5.0-8.0)  


 


Ur Specific Gravity    1.020  (1.005-1.030)  


 


Urine Protein    Trace H  (Negative)  


 


Urine Glucose (UA)    Negative  (Negative)  


 


Urine Ketones    Negative  (Negative)  


 


Urine Occult Blood    Trace-intact H  (Negative)  


 


Urine Nitrite    Negative  (Negative)  


 


Urine Bilirubin    Negative  (Negative)  


 


Urine Urobilinogen    0.2  (0.2-1.0)  


 


Ur Leukocyte Esterase    3+ H  (Negative)  


 


Urine RBC    0-5  (0-5)  /hpf


 


Urine WBC    >100 H  (0-5)  /hpf


 


Urine WBC Clumps    Few  (NOT SEEN)  /hpf


 


Ur Squamous Epith Cells    0-5  (0-5)  /hpf


 


Urine Bacteria    Few  (FEW)  /hpf


 


Urine Mucus    Few  (FEW)  /hpf


 


SARS-CoV-2 RNA (LONG)     (NEGATIVE)  














  10/07/20 Range/Units





  15:12 


 


WBC   (4.23-9.07)  K/mm3


 


RBC   (4.63-6.08)  M/mm3


 


Hgb   (13.7-17.5)  gm/dl


 


Hct   (40.1-51.0)  %


 


MCV   (79.0-92.2)  fl


 


MCH   (25.7-32.2)  pg


 


MCHC   (32.2-35.5)  g/dl


 


RDW Std Deviation   (35.1-43.9)  fL


 


Plt Count   (163-337)  K/mm3


 


MPV   (9.4-12.3)  fl


 


Neut % (Auto)   (34.0-67.9)  %


 


Lymph % (Auto)   (21.8-53.1)  %


 


Mono % (Auto)   (5.3-12.2)  %


 


Eos % (Auto)   (0.8-7.0)  


 


Baso % (Auto)   (0.1-1.2)  %


 


Neut # (Auto)   (1.78-5.38)  K/mm3


 


Lymph # (Auto)   (1.32-3.57)  K/mm3


 


Mono # (Auto)   (0.30-0.82)  K/mm3


 


Eos # (Auto)   (0.04-0.54)  K/mm3


 


Baso # (Auto)   (0.01-0.08)  K/mm3


 


PT   (9.7-11.7)  SECONDS


 


INR   


 


APTT   (22-31)  SECONDS


 


Sodium   (136-145)  mEq/L


 


Potassium   (3.5-5.1)  mEq/L


 


Chloride   ()  mEq/L


 


Carbon Dioxide   (21-32)  mEq/L


 


Anion Gap   (5-15)  


 


BUN   (7-18)  mg/dL


 


Creatinine   (0.7-1.3)  mg/dL


 


Est Cr Clr Drug Dosing   mL/min


 


Estimated GFR (MDRD)   (>60)  mL/min


 


BUN/Creatinine Ratio   (14-18)  


 


Glucose   ()  mg/dL


 


POC Glucose   ()  mg/dL


 


Calcium   (8.5-10.1)  mg/dL


 


Magnesium   (1.8-2.4)  mg/dl


 


Total Bilirubin   (0.2-1.0)  mg/dL


 


AST   (15-37)  U/L


 


ALT   (16-63)  U/L


 


Alkaline Phosphatase   ()  U/L


 


CK-MB (CK-2)   (0-3.6)  ng/ml


 


Troponin I   (0.00-0.056)  ng/mL


 


C-Reactive Protein   (<1.0)  mg/dL


 


NT-Pro-B Natriuret Pep   (0-125)  pg/mL


 


Total Protein   (6.4-8.2)  g/dl


 


Albumin   (3.4-5.0)  g/dl


 


Globulin   gm/dL


 


Albumin/Globulin Ratio   (1-2)  


 


Urine Color   (Yellow)  


 


Urine Appearance   (Clear)  


 


Urine pH   (5.0-8.0)  


 


Ur Specific Gravity   (1.005-1.030)  


 


Urine Protein   (Negative)  


 


Urine Glucose (UA)   (Negative)  


 


Urine Ketones   (Negative)  


 


Urine Occult Blood   (Negative)  


 


Urine Nitrite   (Negative)  


 


Urine Bilirubin   (Negative)  


 


Urine Urobilinogen   (0.2-1.0)  


 


Ur Leukocyte Esterase   (Negative)  


 


Urine RBC   (0-5)  /hpf


 


Urine WBC   (0-5)  /hpf


 


Urine WBC Clumps   (NOT SEEN)  /hpf


 


Ur Squamous Epith Cells   (0-5)  /hpf


 


Urine Bacteria   (FEW)  /hpf


 


Urine Mucus   (FEW)  /hpf


 


SARS-CoV-2 RNA (LONG)  Negative  (NEGATIVE)  











Meds: 


Medications











Generic Name Dose Route Start Last Admin





  Trade Name Kayce  PRN Reason Stop Dose Admin


 


Sodium Chloride  1,000 mls @ 125 mls/hr  10/07/20 14:45  10/07/20 14:56





  Normal Saline  IV   125 mls/hr





  ASDIRECTED LEXY   Administration














Discontinued Medications














Generic Name Dose Route Start Last Admin





  Trade Name Kayce  PRN Reason Stop Dose Admin


 


Aspirin  324 mg  10/07/20 14:39  10/07/20 14:56





  Aspirin  PO  10/07/20 14:40  324 mg





  ONETIME ONE   Administration


 


Nitroglycerin/Dextrose  25 mg in 250 mls @ 6 mls/hr  10/07/20 14:45  10/07/20 

14:56





  Nitroglycerin  25 Mg/D5w 250 Ml  IV   10 mcg/min





  TITRATE LEXY   6 mls/hr





    Administration





  Protocol  





  10 MCG/MIN  


 


Levofloxacin/Dextrose 750 mg/  150 mls @ 100 mls/hr  10/07/20 16:47  10/07/20 

17:04





  Premix  IV  10/07/20 18:16  100 mls/hr





  ONETIME ONE   Administration














- Radiology Interpretation


Free Text/Narrative:: 





60-year-old male sent across from [facility] nursing [facility] for evaluation 

of reported central chest pain rating up into his left shoulder and down his 

left arm.  Patient is a very poor historian and no significant information could

be gleaned from the patient.  He does have some beads of sweat on his forehead. 

He is an insulin-dependent diabetic no one has bothered to check his blood 

sugars.  ECG does not show any signs of acute ischemic change.  Plan he will 

have a chest x-ray routine labs including cardiac markers performed.  IV is D5 

normal saline at 125 mils per hour.  Blood sugar to be checked at this time.  He

will not receive aspirin or nitroglycerin until I am convinced he is suffering 

any cardiac injury.





- Re-Assessments/Exams


Free Text/Narrative Re-Assessment/Exam: 





10/07/20 15:23 rest x-ray reveals mildly hyperinflated lung fields bilaterally. 

Perhaps a small right-sided pleural effusion evident.  The left hemidiaphragm is

more elevated than normal.  Cardiac silhouette is normal.  No pleural pulmonary 

infiltrates or pneumothorax evident.  Bedside blood sugar was reportedly 166.





10/07/20 15:27White count is 6.98 with 76.4% neutrophils on the auto 

differential.  Hemoglobin is 12.7 with hematocrit of 40.2.  MCV is slightly 

elevated at 94.1.  Platelet count normal at 223,000.





10/07/20 15:58 Sodium 139 with potassium 3.9.  Chloride 100 with a bicarb of 27.

 Anion gap is 15.9.  BUN is 22 with a creatinine of 0.9 and a GFR greater than 

60.  BUN/creatinine ratio is mildly elevated at 24.4 suggesting mild volume 

depletion.  Glucose 192.  It was 166 at the bedside.  Calcium is 8.6 with a 

magnesium of 1.9.  Liver function normal CK-MB fraction is 1.2.  Troponin I is 

less than 0.017.  C-reactive protein less than 0.2 total protein 7.8 with an 

albumin fraction of 3.7.  Therefore no clinical evidence of myocardial 

infarction





10/07/20 16:23 Urinalysis came back showing cloudy urine with a trace of protein

and a trace of occult blood leukocyte Estrace is 3+ positive.  Micro is pending 

COVID-19 screen came back negative.





10/07/20 16:45 The microanalysis  of the urinalysis shows 0-5 RBCs but greater 

than 100 blood cells per high-power field with white blood cell clumps.  Urine 

will be sent for culture patient is allergic to penicillin with apparently a 

fairly significant reaction.  He will therefore be placed on Levaquin 750 mg IV.





10/07/20 18:08 vital signs reveal /78.  Heart rate is 108 and is sinus 

rythm. Oxygen  O2 sats 95 to 96% on room air.  Additively he will be discharged 

back to the nursing home since it appears he will be able to keep down oral 

antibiotic therapy.  He will be started on Levaquin 500 mg once daily for 

another 9 days.














Departure





- Departure


Time of Disposition: 18:48


Disposition: Home, Self-Care 01


Reason for Transfer *Q: Other


Condition: Fair


Clinical Impression: 


 Urinary tract infection after immobility





Prescriptions: 


levoFLOXacin [Levaquin] 500 mg PO DAILY #9 tab


Instructions:  Urinary Tract Infection, Adult, Easy-to-Read


Referrals: 


PCP,None [Primary Care Provider] - 


Forms:  ED Department Discharge


Additional Instructions: 


Evaluation in the emergency room today in regards to reported chest pain.  

However history is difficult to obtain as the patient says yes to all questions 

answered of him.  This includes pain in his elbow and nape of his neck as well 

as his chest and arm.  Investigations revealed no evidence of cardiac related 

illness.  ECG was sinus with no signs of ischemia.  However further 

investigations revealed he does have a significant urinary tract infection with 

greater than 1 greater than 100 white blood cells per high-power field 

suggesting upper urinary tract infection.  Initial dose of antibiotics were 

Levaquin 750 mg given intravenously while in the ED.  He is not clinically 

dehydrated.  He is COVID-19 screen came back negative.  Therefore he will be 

discharged back to the nursing home with plans to continue oral antibiotic 

Levaquin 500 mg by mouth once daily at suppertime for the next 9 days to clear 

up urinary tract infection.  Courage plenty of fluids.  Diet as tolerated to new

Tylenol 650 mg every 4-6 hours necessary for fever relief.





Sepsis Event Note (ED)





- Evaluation


Sepsis Screening Result: No Definite Risk





- Focused Exam


Vital Signs: 


                                   Vital Signs











  Temp Pulse Resp BP Pulse Ox


 


 10/07/20 14:31  36.6 C  98  14  164/84 H  98














- My Orders


Last 24 Hours: 


My Active Orders





10/07/20 14:40


Chest 1V Frontal [CR] Stat 





10/07/20 14:41


Oxygen Therapy [RC] ASDIRECTED 





10/07/20 14:45


Sodium Chloride 0.9% [Normal Saline] 1,000 ml IV ASDIRECTED 





10/07/20 14:47


Blood Glucose Check, Bedside [RC] ONETIME 





10/07/20 15:10


CULTURE URINE [RM] Routine 














- Assessment/Plan


Last 24 Hours: 


My Active Orders





10/07/20 14:40


Chest 1V Frontal [CR] Stat 





10/07/20 14:41


Oxygen Therapy [RC] ASDIRECTED 





10/07/20 14:45


Sodium Chloride 0.9% [Normal Saline] 1,000 ml IV ASDIRECTED 





10/07/20 14:47


Blood Glucose Check, Bedside [RC] ONETIME 





10/07/20 15:10


CULTURE URINE [RM] Routine

## 2020-11-10 ENCOUNTER — HOSPITAL ENCOUNTER (EMERGENCY)
Dept: HOSPITAL 41 - JD.ED | Age: 60
LOS: 1 days | Discharge: HOME | End: 2020-11-11
Payer: MEDICARE

## 2020-11-10 DIAGNOSIS — F02.80: ICD-10-CM

## 2020-11-10 DIAGNOSIS — Z88.8: ICD-10-CM

## 2020-11-10 DIAGNOSIS — G30.9: ICD-10-CM

## 2020-11-10 DIAGNOSIS — I10: ICD-10-CM

## 2020-11-10 DIAGNOSIS — F41.9: ICD-10-CM

## 2020-11-10 DIAGNOSIS — E11.9: ICD-10-CM

## 2020-11-10 DIAGNOSIS — Z88.6: ICD-10-CM

## 2020-11-10 DIAGNOSIS — E78.00: ICD-10-CM

## 2020-11-10 DIAGNOSIS — Z79.82: ICD-10-CM

## 2020-11-10 DIAGNOSIS — N39.0: ICD-10-CM

## 2020-11-10 DIAGNOSIS — Z79.4: ICD-10-CM

## 2020-11-10 DIAGNOSIS — Z79.899: ICD-10-CM

## 2020-11-10 DIAGNOSIS — U07.1: Primary | ICD-10-CM

## 2020-11-10 DIAGNOSIS — Z88.0: ICD-10-CM

## 2020-11-10 PROCEDURE — 71045 X-RAY EXAM CHEST 1 VIEW: CPT

## 2020-11-10 PROCEDURE — 82803 BLOOD GASES ANY COMBINATION: CPT

## 2020-11-10 PROCEDURE — 87184 SC STD DISK METHOD PER PLATE: CPT

## 2020-11-10 PROCEDURE — 36600 WITHDRAWAL OF ARTERIAL BLOOD: CPT

## 2020-11-10 PROCEDURE — 87181 SC STD AGAR DILUTION PER AGT: CPT

## 2020-11-10 PROCEDURE — 87088 URINE BACTERIA CULTURE: CPT

## 2020-11-10 PROCEDURE — 96365 THER/PROPH/DIAG IV INF INIT: CPT

## 2020-11-10 PROCEDURE — 86140 C-REACTIVE PROTEIN: CPT

## 2020-11-10 PROCEDURE — 87040 BLOOD CULTURE FOR BACTERIA: CPT

## 2020-11-10 PROCEDURE — 93005 ELECTROCARDIOGRAM TRACING: CPT

## 2020-11-10 PROCEDURE — 85652 RBC SED RATE AUTOMATED: CPT

## 2020-11-10 PROCEDURE — U0002 COVID-19 LAB TEST NON-CDC: HCPCS

## 2020-11-10 PROCEDURE — 87086 URINE CULTURE/COLONY COUNT: CPT

## 2020-11-10 PROCEDURE — 85007 BL SMEAR W/DIFF WBC COUNT: CPT

## 2020-11-10 PROCEDURE — 80053 COMPREHEN METABOLIC PANEL: CPT

## 2020-11-10 PROCEDURE — 83605 ASSAY OF LACTIC ACID: CPT

## 2020-11-10 PROCEDURE — 87804 INFLUENZA ASSAY W/OPTIC: CPT

## 2020-11-10 PROCEDURE — 85379 FIBRIN DEGRADATION QUANT: CPT

## 2020-11-10 PROCEDURE — 99285 EMERGENCY DEPT VISIT HI MDM: CPT

## 2020-11-10 PROCEDURE — 83735 ASSAY OF MAGNESIUM: CPT

## 2020-11-10 PROCEDURE — 84484 ASSAY OF TROPONIN QUANT: CPT

## 2020-11-10 PROCEDURE — 85027 COMPLETE CBC AUTOMATED: CPT

## 2020-11-10 PROCEDURE — 36415 COLL VENOUS BLD VENIPUNCTURE: CPT

## 2020-11-10 PROCEDURE — 81001 URINALYSIS AUTO W/SCOPE: CPT

## 2020-11-10 NOTE — EDM.PDOC
ED HPI GENERAL MEDICAL PROBLEM





- General


Chief Complaint: Fever


Stated Complaint: [facility]


Time Seen by Provider: 11/10/20 23:34


Source of Information: Reports: Patient, Nursing Home Records, RN Notes Reviewed


History Limitations: Reports: Altered Mental Status (minimal patient input)





- History of Present Illness


INITIAL COMMENTS - FREE TEXT/NARRATIVE: 





Mr. Galvez is a very pleasant 60-year-old gentleman who is now brought to the 

ED by EMS from [facility] psychiatric unit with a report of sudden 

onset of fever, tachycardia, and weakness, tonight.  Unfortunately, the patient 

is essentially nonverbal, and cannot provide any meaningful information.





We are notified that the patient has tested negative for the SARS-CoV-2 virus in

the past, however, he was recently tested, with results still pending.  We are 

notified that another resident at the patient's nursing home has tested positive

for the virus.





Here in the ED, the patient is found to be tachycardic at 119 bpm, tachypneic at

29 rpm, febrile at 101.1 degrees, with an oxygen saturation of 91% on room air.





Due to the patient's dementia, his review of systems is unobtainable.








The patient's PCP is Dr. Regis Barajas.











- Related Data


                                    Allergies











Allergy/AdvReac Type Severity Reaction Status Date / Time


 


acetaminophen [From Tylenol] Allergy Severe Facial Verified 11/10/20 23:34





   Swelling  


 


Penicillins Allergy Severe Facial Verified 11/10/20 23:34





   Swelling  


 


naproxen [From Aleve] Allergy Mild Cannot Verified 11/10/20 23:34





   Remember  











Home Meds: 


                                    Home Meds





metFORMIN [Glucophage] 1,000 mg PO BID 03/17/17 [History]


Losartan [Cozaar] 100 mg PO DAILY 06/15/19 [History]


atorvaSTATin [Lipitor] 20 mg PO BEDTIME 06/17/19 [History]


Aspirin [Adult Low Dose Aspirin EC] 81 mg PO DAILY 10/07/20 [History]


Calcium Carbonate [Tums] 500 mg PO BID 10/07/20 [History]


[place]/Min Oil/Kiana/Wool Alcoh [Eucerin Creme] 1 applic TOP BID 10/07/20 

[History]


Cholecalciferol (Vitamin D3) [Vitamin D3] 1,000 unit PO DAILY 10/07/20 [History]


Furosemide [Lasix] 20 mg PO DAILY 10/07/20 [History]


Ibuprofen 600 mg PO Q6HR PRN 10/07/20 [History]


Insulin Aspart [NovoLOG] 3 unit SQ TIDMEALS 10/07/20 [History]


Insulin Detemir [Levemir] 20 unit SUBCUT DAILY 10/07/20 [History]


LORazepam [Ativan] 0.5 mg PO TID 10/07/20 [History]


Magnesium Hydroxide [Milk of Magnesia] 30 ml PO BEDTIME 10/07/20 [History]


Melatonin 9 mg PO BEDTIME 10/07/20 [History]


Mirtazapine [Remeron] 7.5 mg PO BEDTIME 10/07/20 [History]


Multivit-Min/FA/Lycopen/Lutein [Certavite Sr with Lutein Tab] 1 each PO DAILY 

10/07/20 [History]


Pyrithione Zinc [Selsun Blue] 1 applic TP DAILY 10/07/20 [History]


bisacodyL [Dulcolax] 5 mg PO BID 10/07/20 [History]


polyethylene glycoL 3350 [MiraLAX] 17 gm PO DAILY PRN 10/07/20 [History]


risperiDONE [Risperdal] 2 mg PO BID 10/07/20 [History]











Past Medical History


Cardiovascular History: Reports: High Cholesterol, Hypertension


Neurological History: Reports: Alzheimers Disease


Psychiatric History: Reports: Anxiety


Endocrine/Metabolic History: Reports: Diabetes, Type II (Trolled with Victoza, 

insulin and metformin and diet.)





- Infectious Disease History


Infectious Disease History: Reports: Chicken Pox, Measles, Mumps





- Past Surgical History


Musculoskeletal Surgical History: Reports: ORIF (right foot)





Social & Family History





- Family History


Family Medical History: No Pertinent Family History





- Caffeine Use


Caffeine Use: Reports: Soda





- Living Situation & Occupation


Living situation: Reports: Single, Extended Care Facility ([facility])


Occupation: Disabled





ED ROS GENERAL





- Review of Systems


Review Of Systems: Unable To Obtain


Reason Not Obtained: Dementia





ED EXAM, GENERAL





- Physical Exam


Exam: See Below


Exam Limited By: No Limitations


General Appearance: WD/WN, No Apparent Distress, Lethargic (opens eyes when his 

name is called)


Eye Exam: Bilateral Eye: EOMI, Normal Inspection


Ears: Normal External Exam, Hearing Grossly Normal


Nose: Normal Inspection


Throat/Mouth: Normal Inspection, Normal Lips, No Airway Compromise


Head: Atraumatic, Normocephalic


Neck: Normal Inspection, Full Range of Motion.  No: Lymphadenopathy (L), 

Lymphadenopathy (R)


Respiratory/Chest: No Respiratory Distress, Lungs Clear, Normal Breath Sounds, 

No Accessory Muscle Use


Cardiovascular: Normal Peripheral Pulses, No Gallop, No JVD, No Murmur, No Rub, 

Tachycardia (regular)


Peripheral Pulses: 3+: Radial (L), Radial (R)


GI/Abdominal: Normal Bowel Sounds, Soft, Non-Tender, No Organomegaly, No 

Distention, No Abnormal Bruit, No Mass


Extremities: Normal Range of Motion, Normal Capillary Refill, Other (1-2+ 

pretibial pitting edema bilaterally)


Neurological: No Motor/Sensory Deficits, Other (The patient opens his eyes when 

his name is called.  He nodded "yes" and "no" to some questions, although did 

not speak to me.  I believe he had spoken to Lillian SHEIKH prior to my entering his 

room.)


Skin Exam: Warm (feels febrile), Dry, Intact, Normal Color, No Rash


  ** #1 Interpretation


EKG Date: 11/10/20


Time: 23:56


Rhythm: Other (Sinus tachycardia)


Rate (Beats/Min): 113


Axis: Normal


P-Wave: Present


QRS: Other (Early transition)


ST-T: Normal


QT: Normal


Comparison: No Change (10/7/2020)





Course





- Vital Signs


Last Recorded V/S: 


                                Last Vital Signs











Temp  38.4 C H  11/11/20 03:37


 


Pulse  112 H  11/11/20 03:37


 


Resp  18   11/11/20 03:37


 


BP  125/68   11/11/20 03:37


 


Pulse Ox  92 L  11/11/20 03:37














- Orders/Labs/Meds


Orders: 


                               Active Orders 24 hr











 Category Date Time Status


 


 Chest 1V Frontal [CR] Stat Exams  11/10/20 23:36 Taken


 


 CULTURE BLOOD [BC] Stat Lab  11/10/20 23:42 Received


 


 CULTURE BLOOD [BC] Stat Lab  11/10/20 23:42 Received


 


 CULTURE URINE [RM] Stat Lab  11/11/20 01:20 Received


 


 Blood Culture x2 Reflex Set [OM.PC] Stat Oth  11/10/20 23:42 Ordered


 


 Isolation [COMM] Routine Oth  11/10/20 23:45 Ordered











Labs: 


                                Laboratory Tests











  11/10/20 11/10/20 11/10/20 Range/Units





  23:41 23:44 23:44 


 


WBC   7.46   (4.23-9.07)  K/mm3


 


RBC   4.09 L   (4.63-6.08)  M/mm3


 


Hgb   12.1 L   (13.7-17.5)  gm/dl


 


Hct   38.1 L   (40.1-51.0)  %


 


MCV   93.2 H   (79.0-92.2)  fl


 


MCH   29.6   (25.7-32.2)  pg


 


MCHC   31.8 L   (32.2-35.5)  g/dl


 


RDW Std Deviation   44.9 H   (35.1-43.9)  fL


 


Plt Count   189   (163-337)  K/mm3


 


MPV   9.7   (9.4-12.3)  fl


 


Neutrophils % (Manual)   83 H   (40-60)  %


 


Band Neutrophils %   7   (0-10)  %


 


Lymphocytes % (Manual)   4 L   (20-40)  %


 


Atypical Lymphs %   0   %


 


Monocytes % (Manual)   6   (2-10)  %


 


Eosinophils % (Manual)   0 L   (0.8-7.0)  %


 


Basophils % (Manual)   0 L   (0.2-1.2)  


 


Platelet Estimate   Adequate   


 


Plt Morphology Comment   Normal   


 


RBC Morph Comment   Normal   


 


ESR     (0-15)  mm/hr


 


D-Dimer, Quantitative     (0.19-0.50)  mg/L


 


Puncture Site  Lt radial    


 


ABG pH  7.46 H    (7.35-7.45)  


 


ABG pCO2  36.6    (35.0-45.0)  mmHg


 


ABG pO2  64.0 L    (80.0-100.0)  mmHg


 


ABG HCO3  25.8    (22.0-26.0)  meq/L


 


ABG O2 Saturation  92.3 L    (96.0-97.0)  %


 


ABG Base Excess  2.5 H    (-2-2.0)  


 


A-a Gradient  40    mmHg


 


O2 Delivery Device  Room air    


 


Oxygen Flow Rate  0.0    


 


FiO2  21.00    (21..00)  %


 


Sodium    138  (136-145)  mEq/L


 


Potassium    4.0  (3.5-5.1)  mEq/L


 


Chloride    101  ()  mEq/L


 


Carbon Dioxide    27  (21-32)  mEq/L


 


Anion Gap    14.0  (5-15)  


 


BUN    21 H  (7-18)  mg/dL


 


Creatinine    1.0  (0.7-1.3)  mg/dL


 


Est Cr Clr Drug Dosing    86.22  mL/min


 


Estimated GFR (MDRD)    > 60  (>60)  mL/min


 


BUN/Creatinine Ratio    21.0 H  (14-18)  


 


Glucose    141 H  ()  mg/dL


 


Lactic Acid     (0.4-2.0)  mmol/L


 


Calcium    9.3  (8.5-10.1)  mg/dL


 


Magnesium    2.1  (1.8-2.4)  mg/dl


 


Total Bilirubin    0.5  (0.2-1.0)  mg/dL


 


AST    16  (15-37)  U/L


 


ALT    21  (16-63)  U/L


 


Alkaline Phosphatase    74  ()  U/L


 


Troponin I    < 0.017  (0.00-0.056)  ng/mL


 


C-Reactive Protein    2.8 H*  (<1.0)  mg/dL


 


Total Protein    7.7  (6.4-8.2)  g/dl


 


Albumin    3.8  (3.4-5.0)  g/dl


 


Globulin    3.9  gm/dL


 


Albumin/Globulin Ratio    1.0  (1-2)  


 


Urine Color     (Yellow)  


 


Urine Appearance     (Clear)  


 


Urine pH     (5.0-8.0)  


 


Ur Specific Gravity     (1.005-1.030)  


 


Urine Protein     (Negative)  


 


Urine Glucose (UA)     (Negative)  


 


Urine Ketones     (Negative)  


 


Urine Occult Blood     (Negative)  


 


Urine Nitrite     (Negative)  


 


Urine Bilirubin     (Negative)  


 


Urine Urobilinogen     (0.2-1.0)  


 


Ur Leukocyte Esterase     (Negative)  


 


Urine RBC     (0-5)  /hpf


 


Urine WBC     (0-5)  /hpf


 


Ur Epithelial Cells     (0-5)  /hpf


 


Urine Bacteria     (FEW)  /hpf


 


Urine Mucus     (FEW)  /hpf


 


SARS-CoV-2 RNA (LONG)     (NEGATIVE)  














  11/10/20 11/10/20 11/10/20 Range/Units





  23:44 23:44 23:44 


 


WBC     (4.23-9.07)  K/mm3


 


RBC     (4.63-6.08)  M/mm3


 


Hgb     (13.7-17.5)  gm/dl


 


Hct     (40.1-51.0)  %


 


MCV     (79.0-92.2)  fl


 


MCH     (25.7-32.2)  pg


 


MCHC     (32.2-35.5)  g/dl


 


RDW Std Deviation     (35.1-43.9)  fL


 


Plt Count     (163-337)  K/mm3


 


MPV     (9.4-12.3)  fl


 


Neutrophils % (Manual)     (40-60)  %


 


Band Neutrophils %     (0-10)  %


 


Lymphocytes % (Manual)     (20-40)  %


 


Atypical Lymphs %     %


 


Monocytes % (Manual)     (2-10)  %


 


Eosinophils % (Manual)     (0.8-7.0)  %


 


Basophils % (Manual)     (0.2-1.2)  


 


Platelet Estimate     


 


Plt Morphology Comment     


 


RBC Morph Comment     


 


ESR    14  (0-15)  mm/hr


 


D-Dimer, Quantitative  0.44    (0.19-0.50)  mg/L


 


Puncture Site     


 


ABG pH     (7.35-7.45)  


 


ABG pCO2     (35.0-45.0)  mmHg


 


ABG pO2     (80.0-100.0)  mmHg


 


ABG HCO3     (22.0-26.0)  meq/L


 


ABG O2 Saturation     (96.0-97.0)  %


 


ABG Base Excess     (-2-2.0)  


 


A-a Gradient     mmHg


 


O2 Delivery Device     


 


Oxygen Flow Rate     


 


FiO2     (21..00)  %


 


Sodium     (136-145)  mEq/L


 


Potassium     (3.5-5.1)  mEq/L


 


Chloride     ()  mEq/L


 


Carbon Dioxide     (21-32)  mEq/L


 


Anion Gap     (5-15)  


 


BUN     (7-18)  mg/dL


 


Creatinine     (0.7-1.3)  mg/dL


 


Est Cr Clr Drug Dosing     mL/min


 


Estimated GFR (MDRD)     (>60)  mL/min


 


BUN/Creatinine Ratio     (14-18)  


 


Glucose     ()  mg/dL


 


Lactic Acid   2.5 H*   (0.4-2.0)  mmol/L


 


Calcium     (8.5-10.1)  mg/dL


 


Magnesium     (1.8-2.4)  mg/dl


 


Total Bilirubin     (0.2-1.0)  mg/dL


 


AST     (15-37)  U/L


 


ALT     (16-63)  U/L


 


Alkaline Phosphatase     ()  U/L


 


Troponin I     (0.00-0.056)  ng/mL


 


C-Reactive Protein     (<1.0)  mg/dL


 


Total Protein     (6.4-8.2)  g/dl


 


Albumin     (3.4-5.0)  g/dl


 


Globulin     gm/dL


 


Albumin/Globulin Ratio     (1-2)  


 


Urine Color     (Yellow)  


 


Urine Appearance     (Clear)  


 


Urine pH     (5.0-8.0)  


 


Ur Specific Gravity     (1.005-1.030)  


 


Urine Protein     (Negative)  


 


Urine Glucose (UA)     (Negative)  


 


Urine Ketones     (Negative)  


 


Urine Occult Blood     (Negative)  


 


Urine Nitrite     (Negative)  


 


Urine Bilirubin     (Negative)  


 


Urine Urobilinogen     (0.2-1.0)  


 


Ur Leukocyte Esterase     (Negative)  


 


Urine RBC     (0-5)  /hpf


 


Urine WBC     (0-5)  /hpf


 


Ur Epithelial Cells     (0-5)  /hpf


 


Urine Bacteria     (FEW)  /hpf


 


Urine Mucus     (FEW)  /hpf


 


SARS-CoV-2 RNA (LONG)     (NEGATIVE)  














  11/11/20 11/11/20 Range/Units





  00:20 01:24 


 


WBC    (4.23-9.07)  K/mm3


 


RBC    (4.63-6.08)  M/mm3


 


Hgb    (13.7-17.5)  gm/dl


 


Hct    (40.1-51.0)  %


 


MCV    (79.0-92.2)  fl


 


MCH    (25.7-32.2)  pg


 


MCHC    (32.2-35.5)  g/dl


 


RDW Std Deviation    (35.1-43.9)  fL


 


Plt Count    (163-337)  K/mm3


 


MPV    (9.4-12.3)  fl


 


Neutrophils % (Manual)    (40-60)  %


 


Band Neutrophils %    (0-10)  %


 


Lymphocytes % (Manual)    (20-40)  %


 


Atypical Lymphs %    %


 


Monocytes % (Manual)    (2-10)  %


 


Eosinophils % (Manual)    (0.8-7.0)  %


 


Basophils % (Manual)    (0.2-1.2)  


 


Platelet Estimate    


 


Plt Morphology Comment    


 


RBC Morph Comment    


 


ESR    (0-15)  mm/hr


 


D-Dimer, Quantitative    (0.19-0.50)  mg/L


 


Puncture Site    


 


ABG pH    (7.35-7.45)  


 


ABG pCO2    (35.0-45.0)  mmHg


 


ABG pO2    (80.0-100.0)  mmHg


 


ABG HCO3    (22.0-26.0)  meq/L


 


ABG O2 Saturation    (96.0-97.0)  %


 


ABG Base Excess    (-2-2.0)  


 


A-a Gradient    mmHg


 


O2 Delivery Device    


 


Oxygen Flow Rate    


 


FiO2    (21..00)  %


 


Sodium    (136-145)  mEq/L


 


Potassium    (3.5-5.1)  mEq/L


 


Chloride    ()  mEq/L


 


Carbon Dioxide    (21-32)  mEq/L


 


Anion Gap    (5-15)  


 


BUN    (7-18)  mg/dL


 


Creatinine    (0.7-1.3)  mg/dL


 


Est Cr Clr Drug Dosing    mL/min


 


Estimated GFR (MDRD)    (>60)  mL/min


 


BUN/Creatinine Ratio    (14-18)  


 


Glucose    ()  mg/dL


 


Lactic Acid    (0.4-2.0)  mmol/L


 


Calcium    (8.5-10.1)  mg/dL


 


Magnesium    (1.8-2.4)  mg/dl


 


Total Bilirubin    (0.2-1.0)  mg/dL


 


AST    (15-37)  U/L


 


ALT    (16-63)  U/L


 


Alkaline Phosphatase    ()  U/L


 


Troponin I    (0.00-0.056)  ng/mL


 


C-Reactive Protein    (<1.0)  mg/dL


 


Total Protein    (6.4-8.2)  g/dl


 


Albumin    (3.4-5.0)  g/dl


 


Globulin    gm/dL


 


Albumin/Globulin Ratio    (1-2)  


 


Urine Color   Yellow  (Yellow)  


 


Urine Appearance   Clear  (Clear)  


 


Urine pH   7.0  (5.0-8.0)  


 


Ur Specific Gravity   1.025  (1.005-1.030)  


 


Urine Protein   Negative  (Negative)  


 


Urine Glucose (UA)   Negative  (Negative)  


 


Urine Ketones   1+ H  (Negative)  


 


Urine Occult Blood   Trace-intact H  (Negative)  


 


Urine Nitrite   Negative  (Negative)  


 


Urine Bilirubin   Negative  (Negative)  


 


Urine Urobilinogen   1.0  (0.2-1.0)  


 


Ur Leukocyte Esterase   1+ H  (Negative)  


 


Urine RBC   5-10 H  (0-5)  /hpf


 


Urine WBC   10-20 H  (0-5)  /hpf


 


Ur Epithelial Cells   Not seen  (0-5)  /hpf


 


Urine Bacteria   Moderate H  (FEW)  /hpf


 


Urine Mucus   Many H  (FEW)  /hpf


 


SARS-CoV-2 RNA (LONG)  Positive H   (NEGATIVE)  











Meds: 


Medications














Discontinued Medications














Generic Name Dose Route Start Last Admin





  Trade Name Freq  PRN Reason Stop Dose Admin


 


Ceftriaxone Sodium 1 gm/  100 mls @ 200 mls/hr  11/11/20 01:54  11/11/20 03:23





  Sodium Chloride  IV  11/11/20 02:23  200 mls/hr





  ONETIME STA   Administration














- Re-Assessments/Exams


Free Text/Narrative Re-Assessment/Exam: 





11/10/20 23:44


As above, the patient was sent from [facility] geriatric psychiatric unit due to

 sudden onset fever, tachycardia, and weakness tonight.  His nurse noted that he

 has a slight cough here in the ED.  He is found to be tachycardic, tachypneic, 

febrile, with an oxygen saturation of 91% on room air.  [facility], and all of the 

patient's current symptoms are very concerning for that.  His physical exam, 

while limited by lack of patient input, is grossly unremarkable.  I have ordered

 a work-up that includes blood work, 2 sets of blood cultures, an ABG, a 

urinalysis by quick catheter, an influenza swab, a swab for the macro SARS, a 

portable chest x-ray, and an ECG.  Ordinarily, I would give a bolus of IV fluid,

 however, if the patient does in fact have COVID-19, we will need to be 

judicious with the IV fluid.








11/11/20 01:41


Portable chest x-ray is read by vRrichard as:


1.  Left lower lung pulmonary nodule.  If long-term stability cannot be 

established with prior imaging consider follow-up CT.


2.  No acute disease.








11/11/20 01:50


The patient's CBC is remarkable for a H/H slightly depressed at 12.1/38.1, with 

the remainder of his CBC being unremarkable.


His CMP is remarkable for a BUN slightly elevated 21, with a Cr normal at 1.0, 

and a blood glucose mildly elevated at 141, with the remainder of his CMP being 

unremarkable.


His magnesium level is within normal limits at 2.1.


His lactic acid level is mildly elevated at 2.5.


His CRP is mildly elevated at 2.8.


His troponin is undetectably low.


His D-dimer is within normal limits at 0.44.


His urinalysis is remarkable for trace occult blood with 5-10 RBCs, 1+ leukocyte

 esterase with 10-20 WBCs, nitrate negative with moderate bacteria, and no 

squamous epithelial cells.


His ABG represents a primary respiratory alkalosis with combined secondary 

metabolic alkalosis.


His influenza swab returned negative.


His swab for the SARS-CoV-2 virus returned positive.





Based on the above, I have ordered a urine culture, and will start the patient 

on IV Rocephin, since I am not satisfied he will be able to take oral medication

 adequately at this time.





At this time, the patient does not meet criterion for treatment with remdesivir,

 convalescent plasma, or dexamethasone.








11/11/20 02:52


Notified by Lillian SHEIKH that [facility] is willing to take the patient back.  The 

patient has been getting out of bed and walking around.





Departure





- Departure


Time of Disposition: 02:55


Disposition: Home, Self-Care 01


Condition: Good


Clinical Impression: 


 COVID-19, UTI (urinary tract infection)








- Discharge Information


*PRESCRIPTION DRUG MONITORING PROGRAM REVIEWED*: Not Applicable


*COPY OF PRESCRIPTION DRUG MONITORING REPORT IN PATIENT TIFFANY: Not Applicable


Referrals: 


Regis Barajas MD [Primary Care Provider] - 


Forms:  ED Department Discharge


Additional Instructions: 


Mr. Galvez was seen in the emergency room after developing a fever, elevated 

heart rate, and generalized weakness tonight.





Work-up in the ER included blood work, 2 sets of blood cultures, an arterial 

blood gas, a urinalysis, a swab for influenza, a swab for the SARS-CoV-2 virus, 

a chest x-ray, and an ECG.





His urinalysis indicates a possible urinary tract infection.  A urine culture 

was sent, and he was started on the antibiotic Rocephin.





A prescription for the antibiotic nitrofurantoin has been provided.  He should 

be given 1 tablet of nitrofurantoin every 12 hours, for 5 days, as prescribed.





We recommend that someone contact the office of his PCP, Dr. Regis Barajas, 

this coming Friday, 11/13/2020, to check on the urine culture results, to make 

sure that he is on the correct antibiotic.





Mr. Galvez's swab for the SARS-CoV-2 virus returned positive, indicating that 

he has COVID-19.





The remainder of Mr. Galvez's work-up was unremarkable.





Mr. Galvez needs to be strictly quarantined until he tests negative for the 

virus, twice.





At present, Mr. Galvez does not meet criterion for treatment for COVID-19, 

however, if his condition declines, in particular, if his oxygen saturation 

drops below 90%, please return him to the ER for reevaluation.





Sepsis Event Note (ED)





- Evaluation


Sepsis Screening Result: Possible Sepsis Risk





- Focused Exam


Vital Signs: 


                                   Vital Signs











  Temp Pulse Resp BP Pulse Ox


 


 11/11/20 03:37  38.4 C H  112 H  18  125/68  92 L


 


 11/10/20 23:29  38.4 C H  119 H  29 H  122/73  91 L














- My Orders


Last 24 Hours: 


My Active Orders





11/10/20 23:36


Chest 1V Frontal [CR] Stat 





11/10/20 23:42


CULTURE BLOOD [BC] Stat 


CULTURE BLOOD [BC] Stat 


Blood Culture x2 Reflex Set [OM.PC] Stat 





11/10/20 23:45


Isolation [COMM] Routine 





11/11/20 01:20


CULTURE URINE [RM] Stat 














- Assessment/Plan


Last 24 Hours: 


My Active Orders





11/10/20 23:36


Chest 1V Frontal [CR] Stat 





11/10/20 23:42


CULTURE BLOOD [BC] Stat 


CULTURE BLOOD [BC] Stat 


Blood Culture x2 Reflex Set [OM.PC] Stat 





11/10/20 23:45


Isolation [COMM] Routine 





11/11/20 01:20


CULTURE URINE [RM] Stat

## 2020-11-11 VITALS — HEART RATE: 112 BPM | DIASTOLIC BLOOD PRESSURE: 68 MMHG | SYSTOLIC BLOOD PRESSURE: 125 MMHG

## 2020-11-11 NOTE — CR
PROCEDURE INFORMATION:

Exam: XR Chest, 1 View

Exam date and time: 11/11/2020 12:14 AM

Age: 60 years old

Clinical indication: Generalized weakness, cough



TECHNIQUE:

Imaging protocol: XR of the chest

Views: 1 view.



COMPARISON:

CR Chest 1V Frontal 10/7/2020 2:29 PM



FINDINGS:

Small left lower lung nodule seen on the comparison examination is partially 
obscured by overlying

monitoring leads. Lungs are otherwise clear. No evidence of pneumothorax or 
pleural effusion. There

is mild pleural thickening at the lung apices. Cardiomediastinal silhouette is 
within normal limits.

Osseous structures appear intact.



IMPRESSION:

1. Left lower lung pulmonary nodule. If long term stability cannot be 
established with prior imaging

consider follow-up CT.

2. No acute disease.



Thank you for allowing us to participate in the care of your patient.

Dictated and Authenticated by: Tray Peña MD

11/11/2020 2:39 AM Central Time ([place] & [place])

AUGUSTINE

## 2020-12-07 ENCOUNTER — HOSPITAL ENCOUNTER (EMERGENCY)
Dept: HOSPITAL 41 - JD.ED | Age: 60
Discharge: HOME | End: 2020-12-07
Payer: MEDICARE

## 2020-12-07 VITALS — SYSTOLIC BLOOD PRESSURE: 99 MMHG | HEART RATE: 78 BPM | DIASTOLIC BLOOD PRESSURE: 58 MMHG

## 2020-12-07 DIAGNOSIS — Z88.8: ICD-10-CM

## 2020-12-07 DIAGNOSIS — E78.00: ICD-10-CM

## 2020-12-07 DIAGNOSIS — R63.0: ICD-10-CM

## 2020-12-07 DIAGNOSIS — Z79.82: ICD-10-CM

## 2020-12-07 DIAGNOSIS — E11.9: ICD-10-CM

## 2020-12-07 DIAGNOSIS — Z88.5: ICD-10-CM

## 2020-12-07 DIAGNOSIS — R00.0: ICD-10-CM

## 2020-12-07 DIAGNOSIS — G30.9: Primary | ICD-10-CM

## 2020-12-07 DIAGNOSIS — F41.9: ICD-10-CM

## 2020-12-07 DIAGNOSIS — Z79.899: ICD-10-CM

## 2020-12-07 DIAGNOSIS — I10: ICD-10-CM

## 2020-12-07 DIAGNOSIS — Z88.0: ICD-10-CM

## 2020-12-07 NOTE — EDM.PDOC
ED HPI GENERAL MEDICAL PROBLEM





- General


Chief Complaint: Gastrointestinal Problem


Stated Complaint: [facility]


Time Seen by Provider: 12/07/20 13:58


Source of Information: Reports: Patient, RN Notes Reviewed





- History of Present Illness


INITIAL COMMENTS - FREE TEXT/NARRATIVE: 





60 yr old NH patient about 10 to 12 days post covid diagnosed almost 4 wks ago. 

He had been sent to the [facility], now back at [facility].  Reported to not be 

eating or drinking well.  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 

hx or Psych hx prior to Alzheimers Disease.   Pt not talking at time of my exam.

 There is no hx of recent fever, vomting or diarrhea.  Was noted to have fast 

heart rate in the 120's today at the NH.  





- Related Data


                                    Allergies











Allergy/AdvReac Type Severity Reaction Status Date / Time


 


acetaminophen [From Tylenol] Allergy Severe Facial Verified 12/07/20 14:25





   Swelling  


 


Penicillins Allergy Severe Facial Verified 12/07/20 14:25





   Swelling  


 


naproxen [From Aleve] Allergy Mild Cannot Verified 12/07/20 14:25





   Remember  











Home Meds: 


                                    Home Meds





metFORMIN [Glucophage] 1,000 mg PO BID 03/17/17 [History]


Losartan [Cozaar] 100 mg PO DAILY 06/15/19 [History]


atorvaSTATin [Lipitor] 20 mg PO BEDTIME 06/17/19 [History]


Aspirin [Adult Low Dose Aspirin EC] 81 mg PO DAILY 10/07/20 [History]


Calcium Carbonate [Tums] 500 mg PO BID 10/07/20 [History]


[place]/Min Oil/Kiana/Wool Alcoh [Eucerin Creme] 1 applic TOP BID 10/07/20 

[History]


Cholecalciferol (Vitamin D3) [Vitamin D3] 1,000 unit PO DAILY 10/07/20 [History]


Furosemide [Lasix] 20 mg PO DAILY 10/07/20 [History]


Ibuprofen 600 mg PO Q6HR PRN 10/07/20 [History]


Insulin Aspart [NovoLOG] 3 unit SQ TIDMEALS 10/07/20 [History]


Insulin Detemir [Levemir] 20 unit SUBCUT DAILY 10/07/20 [History]


LORazepam [Ativan] 0.5 mg PO TID 10/07/20 [History]


Magnesium Hydroxide [Milk of Magnesia] 30 ml PO BEDTIME 10/07/20 [History]


Melatonin 9 mg PO BEDTIME 10/07/20 [History]


Mirtazapine [Remeron] 7.5 mg PO BEDTIME 10/07/20 [History]


Multivit-Min/FA/Lycopen/Lutein [Certavite Sr with Lutein Tab] 1 each PO DAILY 

10/07/20 [History]


Pyrithione Zinc [Selsun Blue] 1 applic TP DAILY 10/07/20 [History]


bisacodyL [Dulcolax] 5 mg PO BID 10/07/20 [History]


polyethylene glycoL 3350 [MiraLAX] 17 gm PO DAILY PRN 10/07/20 [History]


risperiDONE [Risperdal] 2 mg PO BID 10/07/20 [History]


Ipratropium/Albuterol Sulfate [Iprat-Albut 0.5-3(2.5) MG/3 ML] 1 inhalation INH 

QID 12/07/20 [History]











Past Medical History


HEENT History: Reports: None


Cardiovascular History: Reports: High Cholesterol, Hypertension


Respiratory History: Reports: Other (See Below)


Other Respiratory History: acute respiratory disease (+ COVID)


Gastrointestinal History: Reports: None


Other Gastrointestinal History: polyphagia


Genitourinary History: Reports: None


Musculoskeletal History: Reports: None


Neurological History: Reports: Alzheimers Disease, Other (See Below)


Other Neuro History: restlessness/agitation


Psychiatric History: Reports: Anxiety


Endocrine/Metabolic History: Reports: Diabetes, Type II


Hematologic History: Reports: None


Immunologic History: Reports: None


Oncologic (Cancer) History: Reports: None


Dermatologic History: Reports: None





- Infectious Disease History


Infectious Disease History: Reports: Chicken Pox, Measles, Mumps, Novel 

Coronavirus





- Past Surgical History


Musculoskeletal Surgical History: Reports: ORIF


Other Musculoskeletal Surgeries/Procedures:: ORIF of Right Foot. Brace in place


Dermatological Surgical History: Reports: None





Social & Family History





- Family History


Family Medical History: No Pertinent Family History





- Tobacco Use


Tobacco Use Status *Q: Never Tobacco User


Second Hand Smoke Exposure: No





- Caffeine Use


Caffeine Use: Reports: None





- Recreational Drug Use


Recreational Drug Use: No





- Living Situation & Occupation


Living situation: Reports: Single, Extended Care Facility ([facility] hospital)


Occupation: Disabled





ED ROS GENERAL





- Review of Systems


Review Of Systems: Unable To Obtain


Reason Not Obtained: Pt sleeping soundly, not talking at time of my exam. 





ED EXAM, GENERAL





- Physical Exam


Exam: See Below


Exam Limited By: Altered Mental Status


General Appearance: Other (sleeping soundly at time of my exam, was awake at 

time of arrival to ED and triage)


Head: Atraumatic


Neck: Other (No JVD)


Respiratory/Chest: No Respiratory Distress, Lungs Clear, Normal Breath Sounds.  

No: Rhonchi, Wheezing


Cardiovascular: Tachycardia


GI/Abdominal: Soft, Non-Tender


Extremities: Normal Inspection.  No: Pedal Edema, Leg Pain, Increased Warmth, 

Redness


Neurological: Other (sleeping soundly at time of my exam)


Skin Exam: Warm, Dry, No Rash





Course





- Vital Signs


Last Recorded V/S: 


                                Last Vital Signs











Temp  96.9 F   12/07/20 13:45


 


Pulse  126 H  12/07/20 13:45


 


Resp  23 H  12/07/20 13:45


 


BP  108/67   12/07/20 13:45


 


Pulse Ox  95   12/07/20 13:45














- Orders/Labs/Meds


Orders: 


                               Active Orders 24 hr











 Category Date Time Status


 


 EKG 12 Lead [EKG Documentation Completion] [RC] STAT Care  12/07/20 14:12 

Active


 


 Peripheral IV Care [RC] .AS DIRECTED Care  12/07/20 14:12 Active


 


 Chest 1V Frontal [CR] Stat Exams  12/07/20 14:11 Taken


 


 TSH [CHEM] Stat Lab  12/07/20 14:30 Received


 


 Sodium Chloride 0.9% [Normal Saline] 1,000 ml Med  12/07/20 14:15 Active





 IV ONETIME   


 


 Sodium Chloride 0.9% [Saline Flush] Med  12/07/20 14:12 Active





 10 ml FLUSH ASDIRECTED PRN   


 


 Peripheral IV Insertion Adult [OM.PC] Stat Oth  12/07/20 14:12 Ordered








                                Medication Orders





Sodium Chloride (Normal Saline)  1,000 mls @ 999 mls/hr IV ONETIME LEXY


   Last Admin: 12/07/20 14:20  Dose: 999 mls/hr


   Documented by: BOB


Sodium Chloride (Saline Flush)  10 ml FLUSH ASDIRECTED PRN


   PRN Reason: Keep Vein Open


   Last Admin: 12/07/20 14:20  Dose: 10 ml


   Documented by: BOB








Labs: 


                                Laboratory Tests











  12/07/20 12/07/20 12/07/20 Range/Units





  14:30 14:30 14:30 


 


WBC   7.10   (4.23-9.07)  K/mm3


 


RBC   4.11 L   (4.63-6.08)  M/mm3


 


Hgb   12.0 L   (13.7-17.5)  gm/dl


 


Hct   38.2 L   (40.1-51.0)  %


 


MCV   92.9 H   (79.0-92.2)  fl


 


MCH   29.2   (25.7-32.2)  pg


 


MCHC   31.4 L   (32.2-35.5)  g/dl


 


RDW Std Deviation   45.5 H   (35.1-43.9)  fL


 


Plt Count   199   (163-337)  K/mm3


 


MPV   9.5   (9.4-12.3)  fl


 


Neut % (Auto)   71.6 H   (34.0-67.9)  %


 


Lymph % (Auto)   15.1 L   (21.8-53.1)  %


 


Mono % (Auto)   10.8   (5.3-12.2)  %


 


Eos % (Auto)   1.8   (0.8-7.0)  


 


Baso % (Auto)   0.3   (0.1-1.2)  %


 


Neut # (Auto)   5.08   (1.78-5.38)  K/mm3


 


Lymph # (Auto)   1.07 L   (1.32-3.57)  K/mm3


 


Mono # (Auto)   0.77   (0.30-0.82)  K/mm3


 


Eos # (Auto)   0.13   (0.04-0.54)  K/mm3


 


Baso # (Auto)   0.02   (0.01-0.08)  K/mm3


 


D-Dimer, Quantitative     (0.19-0.50)  mg/L


 


Sodium    137  (136-145)  mEq/L


 


Potassium    4.4  (3.5-5.1)  mEq/L


 


Chloride    101  ()  mEq/L


 


Carbon Dioxide    26  (21-32)  mEq/L


 


Anion Gap    14.4  (5-15)  


 


BUN    17  (7-18)  mg/dL


 


Creatinine    0.9  (0.7-1.3)  mg/dL


 


Est Cr Clr Drug Dosing    95.80  mL/min


 


Estimated GFR (MDRD)    > 60  (>60)  mL/min


 


BUN/Creatinine Ratio    18.9 H  (14-18)  


 


Glucose    153 H  ()  mg/dL


 


Calcium    8.7  (8.5-10.1)  mg/dL


 


Total Bilirubin    0.5  (0.2-1.0)  mg/dL


 


AST    16  (15-37)  U/L


 


ALT    28  (16-63)  U/L


 


Alkaline Phosphatase    85  ()  U/L


 


Troponin I    < 0.017  (0.00-0.056)  ng/mL


 


C-Reactive Protein  3.0 H*    (<1.0)  mg/dL


 


NT-Pro-B Natriuret Pep     (0-125)  pg/mL


 


Total Protein    7.2  (6.4-8.2)  g/dl


 


Albumin    2.7 L  (3.4-5.0)  g/dl


 


Globulin    4.5  gm/dL


 


Albumin/Globulin Ratio    0.6 L  (1-2)  














  12/07/20 12/07/20 Range/Units





  14:30 14:30 


 


WBC    (4.23-9.07)  K/mm3


 


RBC    (4.63-6.08)  M/mm3


 


Hgb    (13.7-17.5)  gm/dl


 


Hct    (40.1-51.0)  %


 


MCV    (79.0-92.2)  fl


 


MCH    (25.7-32.2)  pg


 


MCHC    (32.2-35.5)  g/dl


 


RDW Std Deviation    (35.1-43.9)  fL


 


Plt Count    (163-337)  K/mm3


 


MPV    (9.4-12.3)  fl


 


Neut % (Auto)    (34.0-67.9)  %


 


Lymph % (Auto)    (21.8-53.1)  %


 


Mono % (Auto)    (5.3-12.2)  %


 


Eos % (Auto)    (0.8-7.0)  


 


Baso % (Auto)    (0.1-1.2)  %


 


Neut # (Auto)    (1.78-5.38)  K/mm3


 


Lymph # (Auto)    (1.32-3.57)  K/mm3


 


Mono # (Auto)    (0.30-0.82)  K/mm3


 


Eos # (Auto)    (0.04-0.54)  K/mm3


 


Baso # (Auto)    (0.01-0.08)  K/mm3


 


D-Dimer, Quantitative  1.68 H   (0.19-0.50)  mg/L


 


Sodium    (136-145)  mEq/L


 


Potassium    (3.5-5.1)  mEq/L


 


Chloride    ()  mEq/L


 


Carbon Dioxide    (21-32)  mEq/L


 


Anion Gap    (5-15)  


 


BUN    (7-18)  mg/dL


 


Creatinine    (0.7-1.3)  mg/dL


 


Est Cr Clr Drug Dosing    mL/min


 


Estimated GFR (MDRD)    (>60)  mL/min


 


BUN/Creatinine Ratio    (14-18)  


 


Glucose    ()  mg/dL


 


Calcium    (8.5-10.1)  mg/dL


 


Total Bilirubin    (0.2-1.0)  mg/dL


 


AST    (15-37)  U/L


 


ALT    (16-63)  U/L


 


Alkaline Phosphatase    ()  U/L


 


Troponin I    (0.00-0.056)  ng/mL


 


C-Reactive Protein    (<1.0)  mg/dL


 


NT-Pro-B Natriuret Pep   67  (0-125)  pg/mL


 


Total Protein    (6.4-8.2)  g/dl


 


Albumin    (3.4-5.0)  g/dl


 


Globulin    gm/dL


 


Albumin/Globulin Ratio    (1-2)  











Meds: 


Medications











Generic Name Dose Route Start Last Admin





  Trade Name Freq  PRN Reason Stop Dose Admin


 


Sodium Chloride  1,000 mls @ 999 mls/hr  12/07/20 14:15  12/07/20 14:20





  Normal Saline  IV   999 mls/hr





  ONETIME LEXY   Administration


 


Sodium Chloride  10 ml  12/07/20 14:12  12/07/20 14:20





  Saline Flush  FLUSH   10 ml





  ASDIRECTED PRN   Administration





  Keep Vein Open  














Departure





- Departure


Time of Disposition: 17:03


Disposition: Home, Self-Care 01


Condition: Fair


Clinical Impression: 


 Anorexia, Tachycardia





Alzheimers disease


Qualifiers:


 Alzheimer's disease onset: unspecified onset Dementia behavioral disturbance: 

without behavioral disturbance Qualified Code(s): G30.9 - Alzheimer's disease, 

unspecified








- Discharge Information


Referrals: 


Regis Barajas MD [Primary Care Provider] - 


Forms:  ED Department Discharge


Additional Instructions: 


Labs checked today look OK for being about 2 weeks post covid.  He was given 1 

liter NS and with that heart rate came domw into the 70's and 80's.  Continue 

current care.  Return to ED as needed.  





Sepsis Event Note (ED)





- Evaluation


Sepsis Screening Result: No Definite Risk





- Focused Exam


Vital Signs: 


                                   Vital Signs











  Temp Pulse Resp BP Pulse Ox


 


 12/07/20 13:45  96.9 F  126 H  23 H  108/67  95














- My Orders


Last 24 Hours: 


My Active Orders





12/07/20 14:11


Chest 1V Frontal [CR] Stat 





12/07/20 14:12


EKG 12 Lead [EKG Documentation Completion] [RC] STAT 


Peripheral IV Care [RC] .AS DIRECTED 


Sodium Chloride 0.9% [Saline Flush]   10 ml FLUSH ASDIRECTED PRN 


Peripheral IV Insertion Adult [OM.PC] Stat 





12/07/20 14:15


Sodium Chloride 0.9% [Normal Saline] 1,000 ml IV ONETIME 





12/07/20 14:30


TSH [CHEM] Stat 














- Assessment/Plan


Last 24 Hours: 


My Active Orders





12/07/20 14:11


Chest 1V Frontal [CR] Stat 





12/07/20 14:12


EKG 12 Lead [EKG Documentation Completion] [RC] STAT 


Peripheral IV Care [RC] .AS DIRECTED 


Sodium Chloride 0.9% [Saline Flush]   10 ml FLUSH ASDIRECTED PRN 


Peripheral IV Insertion Adult [OM.PC] Stat 





12/07/20 14:15


Sodium Chloride 0.9% [Normal Saline] 1,000 ml IV ONETIME 





12/07/20 14:30


TSH [CHEM] Stat

## 2020-12-08 NOTE — CR
Chest: Portable view of the chest was obtained.

 

Comparison: No prior chest x-ray.

 

Findings:

Heart size and mediastinum: Within normal limits.  No mediastinal mass

 is seen.

 

Lungs: Increased lung markings are seen on both sides of the chest.  

No additional abnormality is seen.

 

Osseous: Old healed fracture is seen within the left clavicle.  No 

acute osseous finding is seen.

 

Impression:

1.  Mild interstitial change within both lungs most likely 

representing mild COVID pneumonia.

 

Diagnostic code #3

## 2021-01-05 ENCOUNTER — HOSPITAL ENCOUNTER (EMERGENCY)
Dept: HOSPITAL 41 - JD.ED | Age: 61
Discharge: NURSING, REHAB OR LONG-TERM CARE | End: 2021-01-05
Payer: MEDICARE

## 2021-01-05 VITALS — DIASTOLIC BLOOD PRESSURE: 74 MMHG | SYSTOLIC BLOOD PRESSURE: 111 MMHG | HEART RATE: 124 BPM

## 2021-01-05 DIAGNOSIS — F41.9: ICD-10-CM

## 2021-01-05 DIAGNOSIS — G30.9: ICD-10-CM

## 2021-01-05 DIAGNOSIS — I10: ICD-10-CM

## 2021-01-05 DIAGNOSIS — Z88.0: ICD-10-CM

## 2021-01-05 DIAGNOSIS — Z79.82: ICD-10-CM

## 2021-01-05 DIAGNOSIS — E78.00: ICD-10-CM

## 2021-01-05 DIAGNOSIS — E11.9: ICD-10-CM

## 2021-01-05 DIAGNOSIS — Z88.8: ICD-10-CM

## 2021-01-05 DIAGNOSIS — Z79.899: ICD-10-CM

## 2021-01-05 DIAGNOSIS — Z20.822: ICD-10-CM

## 2021-01-05 DIAGNOSIS — Z79.4: ICD-10-CM

## 2021-01-05 DIAGNOSIS — E86.0: Primary | ICD-10-CM

## 2021-01-05 DIAGNOSIS — Z88.5: ICD-10-CM

## 2021-01-05 PROCEDURE — 93005 ELECTROCARDIOGRAM TRACING: CPT

## 2021-01-05 PROCEDURE — 36415 COLL VENOUS BLD VENIPUNCTURE: CPT

## 2021-01-05 PROCEDURE — 86140 C-REACTIVE PROTEIN: CPT

## 2021-01-05 PROCEDURE — 85730 THROMBOPLASTIN TIME PARTIAL: CPT

## 2021-01-05 PROCEDURE — 81001 URINALYSIS AUTO W/SCOPE: CPT

## 2021-01-05 PROCEDURE — 85025 COMPLETE CBC W/AUTO DIFF WBC: CPT

## 2021-01-05 PROCEDURE — 80053 COMPREHEN METABOLIC PANEL: CPT

## 2021-01-05 PROCEDURE — 99285 EMERGENCY DEPT VISIT HI MDM: CPT

## 2021-01-05 PROCEDURE — U0002 COVID-19 LAB TEST NON-CDC: HCPCS

## 2021-01-05 PROCEDURE — 85610 PROTHROMBIN TIME: CPT

## 2021-01-05 PROCEDURE — 71045 X-RAY EXAM CHEST 1 VIEW: CPT

## 2021-01-05 NOTE — CR
Chest: Portable view of the chest was obtained.

 

Comparison: Prior chest x-ray of 12/07/20.

 

Lung markings are increased on both sides of the chest.  Findings are 

felt to be fairly similar to previous study.  No definite acute 

parenchymal change is seen.  Heart size and mediastinum are normal.  

Bony structures are grossly intact.  Old healed fracture deformity is 

noted within the left clavicle.

 

Impression:

1.  Stable findings as described above.

2.  Nothing acute is definitely seen.

 

Diagnostic code #2

## 2021-01-05 NOTE — EDM.PDOC
ED HPI GENERAL MEDICAL PROBLEM





- General


Chief Complaint: General


Stated Complaint: FEVER/WEAKNESS


Time Seen by Provider: 01/05/21 14:35


Source of Information: Reports: Nursing Home Records


History Limitations: Reports: Other (The patient does not talk)





- History of Present Illness


INITIAL COMMENTS - FREE TEXT/NARRATIVE: 





The patient presents from the nursing home for generalized weakness, fever and 

tachycardia.  The patient is in Ger[facility] and does not talk much.  He just got 

his COVID 19 vaccine yesterday.  He has a slight cough.  He has no chest pain, 

abdominal pain, nausea or vomiting. 


Onset: Gradual


Duration: Day(s):


Severity: Moderate


Improves with: Reports: None


Worsens with: Reports: None


Associated Symptoms: Reports: Cough, Fever/Chills, Shortness of Breath.  Denies:

Chest Pain, Headaches, Nausea/Vomiting





- Related Data


                                    Allergies











Allergy/AdvReac Type Severity Reaction Status Date / Time


 


acetaminophen [From Tylenol] Allergy Severe Facial Verified 12/07/20 14:25





   Swelling  


 


Penicillins Allergy Severe Facial Verified 12/07/20 14:25





   Swelling  


 


naproxen [From Aleve] Allergy Mild Cannot Verified 12/07/20 14:25





   Remember  











Home Meds: 


                                    Home Meds





Losartan [Cozaar] 100 mg PO DAILY 06/15/19 [History]


atorvaSTATin [Lipitor] 20 mg PO BEDTIME 06/17/19 [History]


Aspirin [Adult Low Dose Aspirin EC] 81 mg PO DAILY 10/07/20 [History]


Calcium Carbonate [Tums] 500 mg PO BID 10/07/20 [History]


[place]/Min Oil/Kiana/Wool Alcoh [Eucerin Creme] 1 applic TOP BID 10/07/20 

[History]


Cholecalciferol (Vitamin D3) [Vitamin D3] 1,000 unit PO DAILY 10/07/20 [History]


Furosemide [Lasix] 20 mg PO DAILY 10/07/20 [History]


Ibuprofen 600 mg PO Q6HR PRN 10/07/20 [History]


Insulin Aspart [NovoLOG] 3 unit SQ TIDMEALS 10/07/20 [History]


Insulin Detemir [Levemir] 20 unit SUBCUT DAILY 10/07/20 [History]


LORazepam [Ativan] 0.5 mg PO TID 10/07/20 [History]


Melatonin 9 mg PO BEDTIME 10/07/20 [History]


Mirtazapine [Remeron] 7.5 mg PO BEDTIME 10/07/20 [History]


Multivit-Min/FA/Lycopen/Lutein [Certavite Sr with Lutein Tab] 1 each PO DAILY 

10/07/20 [History]


bisacodyL [Dulcolax] 5 mg PO BID PRN 10/07/20 [History]


polyethylene glycoL 3350 [MiraLAX] 17 gm PO DAILY PRN 10/07/20 [History]


risperiDONE [Risperdal] 2 mg PO BID 10/07/20 [History]


Ipratropium/Albuterol Sulfate [Iprat-Albut 0.5-3(2.5) MG/3 ML] 1 inhalation INH 

QID 12/07/20 [History]


Magnesium Hydroxide [Milk of Magnesia] 30 ml PO BEDTIME 01/05/21 [History]


metFORMIN [Glucophage XR] 1,000 mg PO BID 01/05/21 [History]











Past Medical History


HEENT History: Reports: None


Cardiovascular History: Reports: High Cholesterol, Hypertension


Respiratory History: Reports: Other (See Below)


Other Respiratory History: acute respiratory disease (+ COVID)


Gastrointestinal History: Reports: None


Other Gastrointestinal History: polyphagia


Genitourinary History: Reports: None


Musculoskeletal History: Reports: None


Neurological History: Reports: Alzheimers Disease, Other (See Below)


Other Neuro History: restlessness/agitation


Psychiatric History: Reports: Anxiety


Endocrine/Metabolic History: Reports: Diabetes, Type II


Hematologic History: Reports: None


Immunologic History: Reports: None


Oncologic (Cancer) History: Reports: None


Dermatologic History: Reports: None





- Infectious Disease History


Infectious Disease History: Reports: Chicken Pox, Measles, Mumps, Novel 

Coronavirus





- Past Surgical History


Head Surgeries/Procedures: Reports: None


HEENT Surgical History: Reports: None


Cardiovascular Surgical History: Reports: None


Respiratory Surgical History: Reports: None


GI Surgical History: Reports: None


Male  Surgical History: Reports: None


Endocrine Surgical History: Reports: None


Neurological Surgical History: Reports: None


Musculoskeletal Surgical History: Reports: ORIF


Other Musculoskeletal Surgeries/Procedures:: ORIF of Right Foot. Brace in place


Oncologic Surgical History: Reports: None


Dermatological Surgical History: Reports: None





Social & Family History





- Family History


Family Medical History: No Pertinent Family History





- Tobacco Use


Tobacco Use Status *Q: Never Tobacco User





- Caffeine Use


Caffeine Use: Reports: None





- Living Situation & Occupation


Living situation: Reports: Single, Extended Care Facility ([facility])


Occupation: Disabled





ED ROS GENERAL





- Review of Systems


Review Of Systems: See Below


Constitutional: Reports: Fever


HEENT: Reports: No Symptoms


Respiratory: Reports: Shortness of Breath, Cough


Cardiovascular: Denies: No Symptoms


Endocrine: Reports: No Symptoms


GI/Abdominal: Reports: No Symptoms


: Reports: No Symptoms


Musculoskeletal: Reports: No Symptoms





ED EXAM, GENERAL





- Physical Exam


Exam: See Below


Exam Limited By: No Limitations


General Appearance: Alert, No Apparent Distress


Ears: Normal External Exam


Nose: Normal Inspection


Throat/Mouth: Other (Dry mucus membranes)


Head: Atraumatic, Normocephalic


Neck: Normal Inspection


Respiratory/Chest: No Respiratory Distress, Lungs Clear, Normal Breath Sounds


Cardiovascular: No Edema, No Murmur, Tachycardia


GI/Abdominal: Soft, Non-Tender, No Organomegaly, No Mass


Back Exam: Normal Inspection


Extremities: Normal Inspection


  ** #1 Interpretation


EKG Date: 01/05/21


Time: 14:36


Rhythm: Other (Sinus tachycardia)


Rate (Beats/Min): 123


Axis: Normal


P-Wave: Present


QRS: Normal


ST-T: Normal


QT: Normal





Course





- Vital Signs


Last Recorded V/S: 


                                Last Vital Signs











Temp  98.2 F   01/05/21 14:35


 


Pulse  124 H  01/05/21 14:35


 


Resp  16   01/05/21 14:35


 


BP  111/74   01/05/21 14:35


 


Pulse Ox  92 L  01/05/21 14:35














- Orders/Labs/Meds


Orders: 


                               Active Orders 24 hr











 Category Date Time Status


 


 Cardiac Monitoring [RC] .AS DIRECTED Care  01/05/21 15:02 Active


 


 EKG Documentation Completion [RC] ASDIRECTED Care  01/05/21 15:04 Active


 


 Oxygen Therapy [RC] PRN Care  01/05/21 15:02 Active


 


 Peripheral IV Care [RC] .AS DIRECTED Care  01/05/21 15:03 Active


 


 Sodium Chloride 0.9% [Normal Saline] 1,000 ml Med  01/05/21 15:15 Active





 IV ASDIRECTED   


 


 Sodium Chloride 0.9% [Saline Flush] Med  01/05/21 15:02 Active





 10 ml FLUSH ASDIRECTED PRN   


 


 Peripheral IV Insertion Adult [OM.PC] Stat Oth  01/05/21 15:02 Ordered


 


 EKG 12 Lead [EK] Stat Ther  01/05/21 15:04 Ordered








                                Medication Orders





Sodium Chloride (Normal Saline)  1,000 mls @ 125 mls/hr IV ASDIRECTED LEXY


   Last Admin: 01/05/21 15:18  Dose: 125 mls/hr


   Documented by: HELEN


Sodium Chloride (Saline Flush)  10 ml FLUSH ASDIRECTED PRN


   PRN Reason: Keep Vein Open


   Last Admin: 01/05/21 15:18  Dose: 10 ml


   Documented by: HELEN








Labs: 


                                Laboratory Tests











  01/05/21 01/05/21 01/05/21 Range/Units





  15:26 15:35 15:35 


 


WBC   7.82   (4.23-9.07)  K/mm3


 


RBC   3.99 L   (4.63-6.08)  M/mm3


 


Hgb   11.5 L   (13.7-17.5)  gm/dl


 


Hct   37.1 L   (40.1-51.0)  %


 


MCV   93.0 H   (79.0-92.2)  fl


 


MCH   28.8   (25.7-32.2)  pg


 


MCHC   31.0 L   (32.2-35.5)  g/dl


 


RDW Std Deviation   48.6 H   (35.1-43.9)  fL


 


Plt Count   234   (163-337)  K/mm3


 


MPV   9.8   (9.4-12.3)  fl


 


Neut % (Auto)   83.8 H   (34.0-67.9)  %


 


Lymph % (Auto)   7.0 L   (21.8-53.1)  %


 


Mono % (Auto)   8.6   (5.3-12.2)  %


 


Eos % (Auto)   0 L   (0.8-7.0)  


 


Baso % (Auto)   0.3   (0.1-1.2)  %


 


Neut # (Auto)   6.56 H   (1.78-5.38)  K/mm3


 


Lymph # (Auto)   0.55 L   (1.32-3.57)  K/mm3


 


Mono # (Auto)   0.67   (0.30-0.82)  K/mm3


 


Eos # (Auto)   0.00 L   (0.04-0.54)  K/mm3


 


Baso # (Auto)   0.02   (0.01-0.08)  K/mm3


 


Manual Slide Review   Abnormal smear   


 


PT    11.9  (9.7-12.0)  SECONDS


 


INR    1.11  


 


APTT    26.5  (21.7-31.4)  SECONDS


 


Sodium     (136-145)  mEq/L


 


Potassium     (3.5-5.1)  mEq/L


 


Chloride     ()  mEq/L


 


Carbon Dioxide     (21-32)  mEq/L


 


Anion Gap     (5-15)  


 


BUN     (7-18)  mg/dL


 


Creatinine     (0.7-1.3)  mg/dL


 


Est Cr Clr Drug Dosing     


 


Estimated GFR (MDRD)     (>60)  mL/min


 


BUN/Creatinine Ratio     (14-18)  


 


Glucose     ()  mg/dL


 


Calcium     (8.5-10.1)  mg/dL


 


Total Bilirubin     (0.2-1.0)  mg/dL


 


AST     (15-37)  U/L


 


ALT     (16-63)  U/L


 


Alkaline Phosphatase     ()  U/L


 


C-Reactive Protein     (<1.0)  mg/dL


 


Total Protein     (6.4-8.2)  g/dl


 


Albumin     (3.4-5.0)  g/dl


 


Globulin     gm/dL


 


Albumin/Globulin Ratio     (1-2)  


 


Urine Color     (Yellow)  


 


Urine Appearance     (Clear)  


 


Urine pH     (5.0-8.0)  


 


Ur Specific Gravity     (1.005-1.030)  


 


Urine Protein     (Negative)  


 


Urine Glucose (UA)     (Negative)  


 


Urine Ketones     (Negative)  


 


Urine Occult Blood     (Negative)  


 


Urine Nitrite     (Negative)  


 


Urine Bilirubin     (Negative)  


 


Urine Urobilinogen     (0.2-1.0)  


 


Ur Leukocyte Esterase     (Negative)  


 


Urine RBC     (0-5)  /hpf


 


Urine WBC     (0-5)  /hpf


 


Ur Squamous Epith Cells     (0-5)  /hpf


 


Urine Bacteria     (FEW)  /hpf


 


Urine Mucus     (FEW)  /hpf


 


SARS-CoV-2 RNA (LONG)  Negative    (NEGATIVE)  














  01/05/21 01/05/21 Range/Units





  15:35 16:05 


 


WBC    (4.23-9.07)  K/mm3


 


RBC    (4.63-6.08)  M/mm3


 


Hgb    (13.7-17.5)  gm/dl


 


Hct    (40.1-51.0)  %


 


MCV    (79.0-92.2)  fl


 


MCH    (25.7-32.2)  pg


 


MCHC    (32.2-35.5)  g/dl


 


RDW Std Deviation    (35.1-43.9)  fL


 


Plt Count    (163-337)  K/mm3


 


MPV    (9.4-12.3)  fl


 


Neut % (Auto)    (34.0-67.9)  %


 


Lymph % (Auto)    (21.8-53.1)  %


 


Mono % (Auto)    (5.3-12.2)  %


 


Eos % (Auto)    (0.8-7.0)  


 


Baso % (Auto)    (0.1-1.2)  %


 


Neut # (Auto)    (1.78-5.38)  K/mm3


 


Lymph # (Auto)    (1.32-3.57)  K/mm3


 


Mono # (Auto)    (0.30-0.82)  K/mm3


 


Eos # (Auto)    (0.04-0.54)  K/mm3


 


Baso # (Auto)    (0.01-0.08)  K/mm3


 


Manual Slide Review    


 


PT    (9.7-12.0)  SECONDS


 


INR    


 


APTT    (21.7-31.4)  SECONDS


 


Sodium  136   (136-145)  mEq/L


 


Potassium  4.0   (3.5-5.1)  mEq/L


 


Chloride  100   ()  mEq/L


 


Carbon Dioxide  28   (21-32)  mEq/L


 


Anion Gap  12.0   (5-15)  


 


BUN  16   (7-18)  mg/dL


 


Creatinine  0.9   (0.7-1.3)  mg/dL


 


Est Cr Clr Drug Dosing  TNP   


 


Estimated GFR (MDRD)  > 60   (>60)  mL/min


 


BUN/Creatinine Ratio  17.8   (14-18)  


 


Glucose  149 H   ()  mg/dL


 


Calcium  9.4   (8.5-10.1)  mg/dL


 


Total Bilirubin  0.8   (0.2-1.0)  mg/dL


 


AST  15   (15-37)  U/L


 


ALT  17   (16-63)  U/L


 


Alkaline Phosphatase  80   ()  U/L


 


C-Reactive Protein  6.2 H*   (<1.0)  mg/dL


 


Total Protein  7.8   (6.4-8.2)  g/dl


 


Albumin  3.5   (3.4-5.0)  g/dl


 


Globulin  4.3   gm/dL


 


Albumin/Globulin Ratio  0.8 L   (1-2)  


 


Urine Color   Yellow  (Yellow)  


 


Urine Appearance   Slt cloudy H  (Clear)  


 


Urine pH   7.5  (5.0-8.0)  


 


Ur Specific Gravity   1.025  (1.005-1.030)  


 


Urine Protein   1+ H  (Negative)  


 


Urine Glucose (UA)   Negative  (Negative)  


 


Urine Ketones   Trace H  (Negative)  


 


Urine Occult Blood   Negative  (Negative)  


 


Urine Nitrite   Negative  (Negative)  


 


Urine Bilirubin   Negative  (Negative)  


 


Urine Urobilinogen   0.2  (0.2-1.0)  


 


Ur Leukocyte Esterase   Negative  (Negative)  


 


Urine RBC   0-5  (0-5)  /hpf


 


Urine WBC   0-5  (0-5)  /hpf


 


Ur Squamous Epith Cells   5-10 H  (0-5)  /hpf


 


Urine Bacteria   Rare  (FEW)  /hpf


 


Urine Mucus   Many H  (FEW)  /hpf


 


SARS-CoV-2 RNA (LONG)    (NEGATIVE)  











Meds: 


Medications











Generic Name Dose Route Start Last Admin





  Trade Name Freq  PRN Reason Stop Dose Admin


 


Sodium Chloride  1,000 mls @ 125 mls/hr  01/05/21 15:15  01/05/21 15:18





  Normal Saline  IV   125 mls/hr





  ASDIRECTED LEXY   Administration


 


Sodium Chloride  10 ml  01/05/21 15:02  01/05/21 15:18





  Saline Flush  FLUSH   10 ml





  ASDIRECTED PRN   Administration





  Keep Vein Open  














- Re-Assessments/Exams


Free Text/Narrative Re-Assessment/Exam: 





01/05/21 15:56


I ordered oxygen PRN, IV NS at 125mL/hr, EKG, CXR, labs and COVID 19 screen.  

His EKG shows a sinus tachycardia with no acute changes.  His CXR shows no 

infiltrates.


01/05/21 16:35


His WBC was normal.  His Hgb was low at 11.5.  His PT and PTT are normal.  His 

CRP is elevated at 6.2.  His UA shows no UTI.  His COVID is negative.





He was a little dehydrated.  I am giving him a liter of fluid.  I feel this was 

an immune response from the COVID 19 vaccine.  I will discharge him home.





Departure





- Departure


Time of Disposition: 16:40


Disposition: DC/[facility]fer to [facility]


Condition: Good


Clinical Impression: 


 Dehydration








- Discharge Information


*PRESCRIPTION DRUG MONITORING PROGRAM REVIEWED*: Not Applicable


*COPY OF PRESCRIPTION DRUG MONITORING REPORT IN PATIENT TIFFANY: Not Applicable


Referrals: 


Regis Barajas MD [Primary Care Provider] - 


Forms:  ED Department Discharge


Additional Instructions: 


John had dehydration.  He was given a liter of fluid.  His chest x-ray shows no

infiltrate and his urinalysis shows no urinary tract infection.  I believe the 

fever was from a immune response from the COVID 19 vaccine.  Please return if 

John is worse.





Sepsis Event Note (ED)





- Evaluation


Sepsis Screening Result: No Definite Risk





- Focused Exam


Vital Signs: 


                                   Vital Signs











  Temp Pulse Resp BP Pulse Ox


 


 01/05/21 14:35  98.2 F  124 H  16  111/74  92 L














- My Orders


Last 24 Hours: 


My Active Orders





01/05/21 15:02


Cardiac Monitoring [RC] .AS DIRECTED 


Oxygen Therapy [RC] PRN 


Sodium Chloride 0.9% [Saline Flush]   10 ml FLUSH ASDIRECTED PRN 


Peripheral IV Insertion Adult [OM.PC] Stat 





01/05/21 15:03


Peripheral IV Care [RC] .AS DIRECTED 





01/05/21 15:04


EKG Documentation Completion [RC] ASDIRECTED 


EKG 12 Lead [EK] Stat 





01/05/21 15:15


Sodium Chloride 0.9% [Normal Saline] 1,000 ml IV ASDIRECTED 














- Assessment/Plan


Last 24 Hours: 


My Active Orders





01/05/21 15:02


Cardiac Monitoring [RC] .AS DIRECTED 


Oxygen Therapy [RC] PRN 


Sodium Chloride 0.9% [Saline Flush]   10 ml FLUSH ASDIRECTED PRN 


Peripheral IV Insertion Adult [OM.PC] Stat 





01/05/21 15:03


Peripheral IV Care [RC] .AS DIRECTED 





01/05/21 15:04


EKG Documentation Completion [RC] ASDIRECTED 


EKG 12 Lead [EK] Stat 





01/05/21 15:15


Sodium Chloride 0.9% [Normal Saline] 1,000 ml IV ASDIRECTED

## 2021-11-28 ENCOUNTER — HOSPITAL ENCOUNTER (INPATIENT)
Dept: HOSPITAL 41 - JD.ED | Age: 61
LOS: 3 days | Discharge: NURSING, REHAB OR LONG-TERM CARE | DRG: 193 | End: 2021-12-01
Payer: MEDICARE

## 2021-11-28 DIAGNOSIS — Z20.822: ICD-10-CM

## 2021-11-28 DIAGNOSIS — F88: ICD-10-CM

## 2021-11-28 DIAGNOSIS — E11.9: ICD-10-CM

## 2021-11-28 DIAGNOSIS — F02.80: ICD-10-CM

## 2021-11-28 DIAGNOSIS — J96.01: ICD-10-CM

## 2021-11-28 DIAGNOSIS — G30.9: ICD-10-CM

## 2021-11-28 DIAGNOSIS — E78.00: ICD-10-CM

## 2021-11-28 DIAGNOSIS — D64.9: ICD-10-CM

## 2021-11-28 DIAGNOSIS — R63.2: ICD-10-CM

## 2021-11-28 DIAGNOSIS — N28.9: ICD-10-CM

## 2021-11-28 DIAGNOSIS — Z79.899: ICD-10-CM

## 2021-11-28 DIAGNOSIS — E78.5: ICD-10-CM

## 2021-11-28 DIAGNOSIS — I10: ICD-10-CM

## 2021-11-28 DIAGNOSIS — Z88.8: ICD-10-CM

## 2021-11-28 DIAGNOSIS — F41.9: ICD-10-CM

## 2021-11-28 DIAGNOSIS — A41.9: Primary | ICD-10-CM

## 2021-11-28 DIAGNOSIS — E86.0: ICD-10-CM

## 2021-11-28 DIAGNOSIS — R41.89: ICD-10-CM

## 2021-11-28 DIAGNOSIS — Z86.16: ICD-10-CM

## 2021-11-28 DIAGNOSIS — J18.9: ICD-10-CM

## 2021-11-28 DIAGNOSIS — Z79.84: ICD-10-CM

## 2021-11-28 DIAGNOSIS — J18.1: ICD-10-CM

## 2021-11-28 DIAGNOSIS — Z88.0: ICD-10-CM

## 2021-11-28 DIAGNOSIS — Z79.4: ICD-10-CM

## 2021-11-28 PROCEDURE — 36415 COLL VENOUS BLD VENIPUNCTURE: CPT

## 2021-11-28 PROCEDURE — 84484 ASSAY OF TROPONIN QUANT: CPT

## 2021-11-28 PROCEDURE — 87641 MR-STAPH DNA AMP PROBE: CPT

## 2021-11-28 PROCEDURE — 85379 FIBRIN DEGRADATION QUANT: CPT

## 2021-11-28 PROCEDURE — 80048 BASIC METABOLIC PNL TOTAL CA: CPT

## 2021-11-28 PROCEDURE — 71045 X-RAY EXAM CHEST 1 VIEW: CPT

## 2021-11-28 PROCEDURE — 83605 ASSAY OF LACTIC ACID: CPT

## 2021-11-28 PROCEDURE — 93005 ELECTROCARDIOGRAM TRACING: CPT

## 2021-11-28 PROCEDURE — 86738 MYCOPLASMA ANTIBODY: CPT

## 2021-11-28 PROCEDURE — U0002 COVID-19 LAB TEST NON-CDC: HCPCS

## 2021-11-28 PROCEDURE — 87040 BLOOD CULTURE FOR BACTERIA: CPT

## 2021-11-28 PROCEDURE — 85025 COMPLETE CBC W/AUTO DIFF WBC: CPT

## 2021-11-28 PROCEDURE — 71275 CT ANGIOGRAPHY CHEST: CPT

## 2021-11-28 PROCEDURE — 85610 PROTHROMBIN TIME: CPT

## 2021-11-28 PROCEDURE — 80053 COMPREHEN METABOLIC PANEL: CPT

## 2021-11-28 PROCEDURE — 85730 THROMBOPLASTIN TIME PARTIAL: CPT

## 2021-11-28 RX ADMIN — Medication SCH APPLIC: at 20:28

## 2021-11-28 RX ADMIN — SODIUM CHLORIDE SCH UNIT: 9 INJECTION, SOLUTION INTRAVENOUS at 14:50

## 2021-11-28 RX ADMIN — MAGNESIUM HYDROXIDE SCH ML: 400 SUSPENSION ORAL at 20:25

## 2021-11-28 RX ADMIN — SODIUM CHLORIDE SCH: 9 INJECTION, SOLUTION INTRAVENOUS at 19:51

## 2021-11-28 NOTE — EDM.PDOC
ED HPI GENERAL MEDICAL PROBLEM





- General


Chief Complaint: Respiratory Problem


Stated Complaint: [facility]


Time Seen by Provider: 11/28/21 00:41





- History of Present Illness


INITIAL COMMENTS - FREE TEXT/NARRATIVE: 





61-year-old male brought in from the [facility] nursing [facility] with hypoxia.





Patient presents the emergency room via EMS.  He was noted to have a worsening 

cough and his O2 saturation was found to be in the 80s.  Upon arrival by EMS the

patient's O2 saturation had improved to the low 90s with supplemental oxygen.  

He was in the low 90s at 2 L.  EMS also found him to have a wet cough.  He was 

afebrile with a temperature of 99.7.





Patient is able to answer simple questions.  He denies any chest pain or chest 

pressure.  He also denies any pain.  However when asked why he came to the 

emergency room he started talking about shampoo.  The patient has a significant 

psychiatric history and resides at the psychiatric [facility] at the [facility]





- Related Data


                                    Allergies











Allergy/AdvReac Type Severity Reaction Status Date / Time


 


acetaminophen [From Tylenol] Allergy Severe Facial Verified 11/28/21 00:36





   Swelling  


 


Penicillins Allergy Severe Facial Verified 11/28/21 00:36





   Swelling  


 


naproxen [From Aleve] Allergy Mild Cannot Verified 11/28/21 00:36





   Remember  











Home Meds: 


                                    Home Meds





Losartan [Cozaar] 100 mg PO DAILY 06/15/19 [History]


atorvaSTATin [Lipitor] 20 mg PO BEDTIME 06/17/19 [History]


Calcium Carbonate [Tums] 500 mg PO BID 10/07/20 [History]


Cholecalciferol (Vitamin D3) [Vitamin D3] 1,000 unit PO DAILY 10/07/20 [History]


Furosemide [Lasix] 20 mg PO DAILY 10/07/20 [History]


Ibuprofen 600 mg PO Q6HR PRN 10/07/20 [History]


LORazepam [Ativan] 0.5 mg PO QID 10/07/20 [History]


Melatonin 9 mg PO BEDTIME 10/07/20 [History]


Mirtazapine [Remeron] 7.5 mg PO BEDTIME 10/07/20 [History]


Multivit-Min/FA/Lycopen/Lutein [Certavite Sr with Lutein Tab] 1 each PO DAILY 

10/07/20 [History]


bisacodyL [Dulcolax] 5 mg PO BID PRN 10/07/20 [History]


polyethylene glycoL 3350 [MiraLAX] 17 gm PO DAILY PRN 10/07/20 [History]


risperiDONE [Risperdal] 3 mg PO BID 10/07/20 [History]


Ipratropium/Albuterol Sulfate [Iprat-Albut 0.5-3(2.5) MG/3 ML] 1 inhalation INH 

QID PRN 12/07/20 [History]


Magnesium Hydroxide [Milk of Magnesia] 30 ml PO BEDTIME 01/05/21 [History]


metFORMIN [Glucophage XR] 1,000 mg PO BID 01/05/21 [History]


[place]/Min Oil/Kiana/Wool Alcoh [Eucerin Creme] 1 dose TOP BID 11/28/21 [History]


Menthol/Selenium Sulfide [Selsun Blue 1% Shampoo] 118 ml .XX MOTH 11/28/21 

[History]


OLANZapine [ZyPREXA] 10 mg PO DAILY 11/28/21 [History]


Tamsulosin [Tamsulosin 24 Hr] 0.4 mg PO DAILY 11/28/21 [History]


fluvoxaMINE [fluvoxaMINE Maleate] 150 mg PO BID 11/28/21 [History]











Past Medical History


HEENT History: Reports: None


Cardiovascular History: Reports: High Cholesterol, Hypertension


Respiratory History: Reports: Other (See Below)


Other Respiratory History: acute respiratory disease (+ COVID)


Gastrointestinal History: Reports: None


Other Gastrointestinal History: polyphagia


Genitourinary History: Reports: None


Musculoskeletal History: Reports: None


Neurological History: Reports: Alzheimers Disease, Other (See Below)


Other Neuro History: restlessness/agitation


Psychiatric History: Reports: Anxiety


Endocrine/Metabolic History: Reports: Diabetes, Type II


Hematologic History: Reports: None


Immunologic History: Reports: None


Oncologic (Cancer) History: Reports: None


Dermatologic History: Reports: None





- Infectious Disease History


Infectious Disease History: Reports: Chicken Pox, Measles, Mumps, Novel 

Coronavirus





- Past Surgical History


Head Surgeries/Procedures: Reports: None


HEENT Surgical History: Reports: None


Cardiovascular Surgical History: Reports: None


Respiratory Surgical History: Reports: None


GI Surgical History: Reports: None


Male  Surgical History: Reports: None


Endocrine Surgical History: Reports: None


Neurological Surgical History: Reports: None


Musculoskeletal Surgical History: Reports: ORIF


Other Musculoskeletal Surgeries/Procedures:: ORIF of Right Foot. Brace in place


Oncologic Surgical History: Reports: None


Dermatological Surgical History: Reports: None





Social & Family History





- Family History


Family Medical History: No Pertinent Family History





- Caffeine Use


Caffeine Use: Reports: None





- Living Situation & Occupation


Living situation: Reports: Single, Extended Care Facility ([facility])


Occupation: Disabled





ED ROS GENERAL





- Review of Systems


Review Of Systems: See Below


Reason Not Obtained: Difficult to obtain due to psychiatric condition





ED EXAM, GENERAL





- Physical Exam


Exam: See Below


Exam Limited By: No Limitations


General Appearance: Alert, No Apparent Distress, Other (O2 89-91% on room air)


Ears: Normal External Exam, Normal Canal, Hearing Grossly Normal, Normal TMs


Nose: Normal Inspection, Normal Mucosa, No Blood


Throat/Mouth: Normal Inspection, Normal Lips, Normal Gums, Normal Oropharynx, 

Normal Voice, No Airway Compromise


Head: Atraumatic, Normocephalic


Neck: Normal Inspection, Supple, Non-Tender, Full Range of Motion.  No: 

Lymphadenopathy (L), Lymphadenopathy (R)


Respiratory/Chest: No Respiratory Distress, No Accessory Muscle Use, Chest Non-

Tender, Decreased Breath Sounds, Crackles (Crackles noted in both lung bases 

left more so than right)


Cardiovascular: Regular Rate, Rhythm, No Edema, No Murmur


GI/Abdominal: Normal Bowel Sounds, Soft, Non-Tender


Back Exam: Normal Inspection.  No: CVA Tenderness (L), CVA Tenderness (R)


Extremities: Normal Inspection, No Pedal Edema


  ** #1 Interpretation


EKG Date: 11/28/21


Rhythm: Other (Sinus tachycardia)


Rate (Beats/Min): 118


Axis: Normal


P-Wave: Present


QRS: Normal


ST-T: Normal


QT: Normal


Comparison: No Change (No significant change from January 5 of this year he had 

a sinus tachycardia then as well)


EKG Interpretation Comments: 





Abnormal EKG





Course





- Vital Signs


Last Recorded V/S: 


                                Last Vital Signs











Temp  37.4 C   11/28/21 06:00


 


Pulse  100   11/28/21 00:30


 


Resp  20   11/28/21 00:30


 


BP  118/83   11/28/21 00:30


 


Pulse Ox  90 L  11/28/21 00:30














- Orders/Labs/Meds


Orders: 


                               Active Orders 24 hr











 Category Date Time Status


 


 Ang Chest [CT] Stat Exams  11/28/21 02:01 Taken


 


 Ang Chest [CT] Stat Exams  11/28/21 03:37 Taken


 


 Chest 1V Frontal [CR] Stat Exams  11/28/21 00:42 Taken


 


 BLOOD CULTURE [MREF] Stat Lab  11/28/21 00:56 Received


 


 BLOOD CULTURE [MREF] Stat Lab  11/28/21 01:07 Received


 


 Lactated Ringers [Ringers, Lactated] 1,000 ml Med  11/28/21 02:00 Active





 IV ASDIRECTED   


 


 Sodium Chloride 0.9% [Normal Saline] 100 ml Med  11/28/21 02:15 Active





 IV ASDIRECTED   


 


 Sodium Chloride 0.9% [Normal Saline] 100 ml Med  11/28/21 03:45 Active





 IV ASDIRECTED   


 


 Blood Culture x2 Reflex Set [OM.PC] Stat Oth  11/28/21 00:43 Ordered








                                Medication Orders





Lactated Ringer's (Ringers, Lactated)  1,000 mls @ 150 mls/hr IV ASDIRECTED LEXY


   Last Infusion: 11/28/21 04:38  Dose: 999 mls/hr


   Documented by: ALLY


   Admin: 11/28/21 03:42  Dose: 150 mls/hr


   Documented by: ALLY


Sodium Chloride (Normal Saline)  100 mls @ 70 mls/min IV ASDIRECTED LEXY


   Last Admin: 11/28/21 02:41  Dose: 70 mls/min


   Documented by: MIKEY


Sodium Chloride (Normal Saline)  100 mls @ 70 mls/min IV ASDIRECTED LEXY


   Last Admin: 11/28/21 04:20  Dose: 70 mls/min


   Documented by: MIKEY








Labs: 


                                Laboratory Tests











  11/28/21 11/28/21 11/28/21 Range/Units





  00:56 00:56 00:56 


 


WBC  12.53 H    (4.23-9.07)  K/mm3


 


RBC  4.06 L    (4.63-6.08)  M/mm3


 


Hgb  12.1 L    (13.7-17.5)  gm/dl


 


Hct  38.7 L    (40.1-51.0)  %


 


MCV  95.3 H    (79.0-92.2)  fl


 


MCH  29.8    (25.7-32.2)  pg


 


MCHC  31.3 L    (32.2-35.5)  g/dl


 


RDW Std Deviation  46.1 H    (35.1-43.9)  fL


 


Plt Count  226    (163-337)  K/mm3


 


MPV  10.1    (9.4-12.3)  fl


 


Neut % (Auto)  87.5 H    (34.0-67.9)  %


 


Lymph % (Auto)  5.8 L    (21.8-53.1)  %


 


Mono % (Auto)  6.0    (5.3-12.2)  %


 


Eos % (Auto)  0.1 L    (0.8-7.0)  


 


Baso % (Auto)  0.2    (0.1-1.2)  %


 


Neut # (Auto)  10.97 H    (1.78-5.38)  K/mm3


 


Lymph # (Auto)  0.73 L    (1.32-3.57)  K/mm3


 


Mono # (Auto)  0.75    (0.30-0.82)  K/mm3


 


Eos # (Auto)  0.01 L    (0.04-0.54)  K/mm3


 


Baso # (Auto)  0.02    (0.01-0.08)  K/mm3


 


PT     (9.7-12.0)  SECONDS


 


INR     


 


APTT     (21.7-31.4)  SECONDS


 


D-Dimer, Quantitative     (0.19-0.50)  mg/L


 


Sodium   141   (136-145)  mEq/L


 


Potassium   4.3   (3.5-5.1)  mEq/L


 


Chloride   102   ()  mEq/L


 


Carbon Dioxide   20 L   (21-32)  mEq/L


 


Anion Gap   23.3 H   (5-15)  


 


BUN   24 H   (7-18)  mg/dL


 


Creatinine   0.9   (0.7-1.3)  mg/dL


 


Est Cr Clr Drug Dosing   TNP   


 


Estimated GFR (MDRD)   > 60   (>60)  mL/min


 


BUN/Creatinine Ratio   26.7 H   (14-18)  


 


Glucose   206 H   (70-99)  mg/dL


 


Lactic Acid    10.0 H*  (0.4-2.0)  mmol/L


 


Calcium   10.0   (8.5-10.1)  mg/dL


 


Total Bilirubin   0.4   (0.2-1.0)  mg/dL


 


AST   18   (15-37)  U/L


 


ALT   23   (16-63)  U/L


 


Alkaline Phosphatase   79   ()  U/L


 


Troponin I     (0.00-0.056)  ng/mL


 


Total Protein   7.8   (6.4-8.2)  g/dl


 


Albumin   3.7   (3.4-5.0)  g/dl


 


Globulin   4.1   gm/dL


 


Albumin/Globulin Ratio   0.9 L   (1-2)  


 


Mycoplasma pneumon IgM     (NEGATIVE)  


 


SARS-CoV-2 RNA (LONG)     (NEGATIVE)  


 


MRSA (PCR)     














  11/28/21 11/28/21 11/28/21 Range/Units





  00:56 00:56 00:56 


 


WBC     (4.23-9.07)  K/mm3


 


RBC     (4.63-6.08)  M/mm3


 


Hgb     (13.7-17.5)  gm/dl


 


Hct     (40.1-51.0)  %


 


MCV     (79.0-92.2)  fl


 


MCH     (25.7-32.2)  pg


 


MCHC     (32.2-35.5)  g/dl


 


RDW Std Deviation     (35.1-43.9)  fL


 


Plt Count     (163-337)  K/mm3


 


MPV     (9.4-12.3)  fl


 


Neut % (Auto)     (34.0-67.9)  %


 


Lymph % (Auto)     (21.8-53.1)  %


 


Mono % (Auto)     (5.3-12.2)  %


 


Eos % (Auto)     (0.8-7.0)  


 


Baso % (Auto)     (0.1-1.2)  %


 


Neut # (Auto)     (1.78-5.38)  K/mm3


 


Lymph # (Auto)     (1.32-3.57)  K/mm3


 


Mono # (Auto)     (0.30-0.82)  K/mm3


 


Eos # (Auto)     (0.04-0.54)  K/mm3


 


Baso # (Auto)     (0.01-0.08)  K/mm3


 


PT    10.9  (9.7-12.0)  SECONDS


 


INR    0.98  


 


APTT    23.6  (21.7-31.4)  SECONDS


 


D-Dimer, Quantitative  1.01 H    (0.19-0.50)  mg/L


 


Sodium     (136-145)  mEq/L


 


Potassium     (3.5-5.1)  mEq/L


 


Chloride     ()  mEq/L


 


Carbon Dioxide     (21-32)  mEq/L


 


Anion Gap     (5-15)  


 


BUN     (7-18)  mg/dL


 


Creatinine     (0.7-1.3)  mg/dL


 


Est Cr Clr Drug Dosing     


 


Estimated GFR (MDRD)     (>60)  mL/min


 


BUN/Creatinine Ratio     (14-18)  


 


Glucose     (70-99)  mg/dL


 


Lactic Acid     (0.4-2.0)  mmol/L


 


Calcium     (8.5-10.1)  mg/dL


 


Total Bilirubin     (0.2-1.0)  mg/dL


 


AST     (15-37)  U/L


 


ALT     (16-63)  U/L


 


Alkaline Phosphatase     ()  U/L


 


Troponin I   < 0.017   (0.00-0.056)  ng/mL


 


Total Protein     (6.4-8.2)  g/dl


 


Albumin     (3.4-5.0)  g/dl


 


Globulin     gm/dL


 


Albumin/Globulin Ratio     (1-2)  


 


Mycoplasma pneumon IgM     (NEGATIVE)  


 


SARS-CoV-2 RNA (LONG)     (NEGATIVE)  


 


MRSA (PCR)     














  11/28/21 11/28/21 11/28/21 Range/Units





  00:56 02:42 02:46 


 


WBC     (4.23-9.07)  K/mm3


 


RBC     (4.63-6.08)  M/mm3


 


Hgb     (13.7-17.5)  gm/dl


 


Hct     (40.1-51.0)  %


 


MCV     (79.0-92.2)  fl


 


MCH     (25.7-32.2)  pg


 


MCHC     (32.2-35.5)  g/dl


 


RDW Std Deviation     (35.1-43.9)  fL


 


Plt Count     (163-337)  K/mm3


 


MPV     (9.4-12.3)  fl


 


Neut % (Auto)     (34.0-67.9)  %


 


Lymph % (Auto)     (21.8-53.1)  %


 


Mono % (Auto)     (5.3-12.2)  %


 


Eos % (Auto)     (0.8-7.0)  


 


Baso % (Auto)     (0.1-1.2)  %


 


Neut # (Auto)     (1.78-5.38)  K/mm3


 


Lymph # (Auto)     (1.32-3.57)  K/mm3


 


Mono # (Auto)     (0.30-0.82)  K/mm3


 


Eos # (Auto)     (0.04-0.54)  K/mm3


 


Baso # (Auto)     (0.01-0.08)  K/mm3


 


PT     (9.7-12.0)  SECONDS


 


INR     


 


APTT     (21.7-31.4)  SECONDS


 


D-Dimer, Quantitative     (0.19-0.50)  mg/L


 


Sodium     (136-145)  mEq/L


 


Potassium     (3.5-5.1)  mEq/L


 


Chloride     ()  mEq/L


 


Carbon Dioxide     (21-32)  mEq/L


 


Anion Gap     (5-15)  


 


BUN     (7-18)  mg/dL


 


Creatinine     (0.7-1.3)  mg/dL


 


Est Cr Clr Drug Dosing     


 


Estimated GFR (MDRD)     (>60)  mL/min


 


BUN/Creatinine Ratio     (14-18)  


 


Glucose     (70-99)  mg/dL


 


Lactic Acid     (0.4-2.0)  mmol/L


 


Calcium     (8.5-10.1)  mg/dL


 


Total Bilirubin     (0.2-1.0)  mg/dL


 


AST     (15-37)  U/L


 


ALT     (16-63)  U/L


 


Alkaline Phosphatase     ()  U/L


 


Troponin I     (0.00-0.056)  ng/mL


 


Total Protein     (6.4-8.2)  g/dl


 


Albumin     (3.4-5.0)  g/dl


 


Globulin     gm/dL


 


Albumin/Globulin Ratio     (1-2)  


 


Mycoplasma pneumon IgM  Negative    (NEGATIVE)  


 


SARS-CoV-2 RNA (LONG)    Negative  (NEGATIVE)  


 


MRSA (PCR)   Negative   














  11/28/21 11/28/21 11/28/21 Range/Units





  03:40 06:09 06:09 


 


WBC     (4.23-9.07)  K/mm3


 


RBC     (4.63-6.08)  M/mm3


 


Hgb     (13.7-17.5)  gm/dl


 


Hct     (40.1-51.0)  %


 


MCV     (79.0-92.2)  fl


 


MCH     (25.7-32.2)  pg


 


MCHC     (32.2-35.5)  g/dl


 


RDW Std Deviation     (35.1-43.9)  fL


 


Plt Count     (163-337)  K/mm3


 


MPV     (9.4-12.3)  fl


 


Neut % (Auto)     (34.0-67.9)  %


 


Lymph % (Auto)     (21.8-53.1)  %


 


Mono % (Auto)     (5.3-12.2)  %


 


Eos % (Auto)     (0.8-7.0)  


 


Baso % (Auto)     (0.1-1.2)  %


 


Neut # (Auto)     (1.78-5.38)  K/mm3


 


Lymph # (Auto)     (1.32-3.57)  K/mm3


 


Mono # (Auto)     (0.30-0.82)  K/mm3


 


Eos # (Auto)     (0.04-0.54)  K/mm3


 


Baso # (Auto)     (0.01-0.08)  K/mm3


 


PT     (9.7-12.0)  SECONDS


 


INR     


 


APTT     (21.7-31.4)  SECONDS


 


D-Dimer, Quantitative     (0.19-0.50)  mg/L


 


Sodium   138   (136-145)  mEq/L


 


Potassium   4.0   (3.5-5.1)  mEq/L


 


Chloride   102   ()  mEq/L


 


Carbon Dioxide   24   (21-32)  mEq/L


 


Anion Gap   16.0 H   (5-15)  


 


BUN   20 H   (7-18)  mg/dL


 


Creatinine   0.8   (0.7-1.3)  mg/dL


 


Est Cr Clr Drug Dosing   93.81   


 


Estimated GFR (MDRD)   > 60   (>60)  mL/min


 


BUN/Creatinine Ratio   25.0 H   (14-18)  


 


Glucose   156 H   (70-99)  mg/dL


 


Lactic Acid  4.3 H*   4.3 H*  (0.4-2.0)  mmol/L


 


Calcium   8.8   (8.5-10.1)  mg/dL


 


Total Bilirubin     (0.2-1.0)  mg/dL


 


AST     (15-37)  U/L


 


ALT     (16-63)  U/L


 


Alkaline Phosphatase     ()  U/L


 


Troponin I     (0.00-0.056)  ng/mL


 


Total Protein     (6.4-8.2)  g/dl


 


Albumin     (3.4-5.0)  g/dl


 


Globulin     gm/dL


 


Albumin/Globulin Ratio     (1-2)  


 


Mycoplasma pneumon IgM     (NEGATIVE)  


 


SARS-CoV-2 RNA (LONG)     (NEGATIVE)  


 


MRSA (PCR)     











Meds: 


Medications











Generic Name Dose Route Start Last Admin





  Trade Name Freq  PRN Reason Stop Dose Admin


 


Lactated Ringer's  1,000 mls @ 150 mls/hr  11/28/21 02:00  11/28/21 04:38





  Ringers, Lactated  IV   999 mls/hr





  ASDIRECTED LEXY   Infusion


 


Sodium Chloride  100 mls @ 70 mls/min  11/28/21 02:15  11/28/21 02:41





  Normal Saline  IV   70 mls/min





  ASDIRECTED LEXY   Administration


 


Sodium Chloride  100 mls @ 70 mls/min  11/28/21 03:45  11/28/21 04:20





  Normal Saline  IV   70 mls/min





  ASDIRECTED LEXY   Administration














Discontinued Medications














Generic Name Dose Route Start Last Admin





  Trade Name Kayce  PRN Reason Stop Dose Admin


 


Lactated Ringer's  1,000 mls @ 999 mls/hr  11/28/21 01:59  11/28/21 02:05





  Ringers, Lactated  IV  11/28/21 02:59  999 mls/hr





  .BOLUS ONE   Administration


 


Levofloxacin/Dextrose 750 mg/  150 mls @ 100 mls/hr  11/28/21 02:39  11/28/21 

03:02





  Premix  IV  11/28/21 04:08  100 mls/hr





  ONETIME ONE   Administration


 


Lactated Ringer's  1,000 mls @ 999 mls/hr  11/28/21 05:35  11/28/21 05:41





  Ringers, Lactated  IV  11/28/21 06:35  999 mls/hr





  .BOLUS ONE   Administration


 


Iopamidol  100 ml  11/28/21 02:04  11/28/21 02:41





  Iopamidol 755 Mg/Ml 100 Ml Bottle  IVPUSH  11/28/21 02:05  100 ml





  ONETIME ONE   Administration


 


Iopamidol  100 ml  11/28/21 03:41  11/28/21 04:20





  Iopamidol 755 Mg/Ml 100 Ml Bottle  IVPUSH  11/28/21 03:42  100 ml





  ONETIME ONE   Administration


 


Lorazepam  0.5 mg  11/28/21 03:29  11/28/21 03:36





  Lorazepam 0.5 Mg Tab  PO  11/28/21 03:30  0.5 mg





  ONETIME ONE   Administration


 


Lorazepam  0.5 mg  11/28/21 05:37  11/28/21 05:41





  Lorazepam 0.5 Mg Tab  PO  11/28/21 05:38  0.5 mg





  ONETIME ONE   Administration


 


Sodium Chloride  10 ml  11/28/21 02:04  11/28/21 02:41





  Sodium Chloride 0.9% 10 Ml Sdv  FLUSH  11/28/21 02:05  10 ml





  ONETIME ONE   Administration


 


Sodium Chloride  10 ml  11/28/21 03:41  11/28/21 04:20





  Sodium Chloride 0.9% 10 Ml Sdv  FLUSH  11/28/21 03:42  10 ml





  ONETIME ONE   Administration














- Re-Assessments/Exams


Free Text/Narrative Re-Assessment/Exam: 





11/28/21 02:25


Chest x-ray shows what could be lower lobe infiltrate.   D-dimer is elevated 

will proceed to a CTA.





Abnormal labs include a white count of 12,500 D-dimer 1.01 anion gap 23.3 BUN 24

 creatinine 0.9 lactic acid 10.0 troponin is less than our detectable limit at 

0.017.  Patient situation is concerning for sepsis fluids started will wait on 

antibiotics will be started because of his penicillin allergy will use Levaquin.

  We are screening for MRSA.


11/28/21 03:21


CTA is suggestive of a atypical process we will check a mycoplasma.  More 

concerning as the study was essentially nondiagnostic for evaluation of a PE due

 to bolus timing but there is suggestion of a potential PE in the right lower 

lobe.  I discussed it with the CT tech will attempt repeat study in another 45 

to 60 minutes.


11/28/21 05:51


We did get the repeat CT this does not show any evidence of pulmonary embolism 

again pulmonary findings of the right and left lower lobes are compatible with 

small airway inflammation or atypical infection.  The patient was started on 

Levaquin and.  Further review of the patient's vitals show that he was 

tachycardic on his last visit here review of the vitals from the skilled nursing

 facility showed that he is often tachycardic in the 120s.  At this point we 

have a repeat lactic acid and basic metabolic panel if these are reassuring 

anticipate discharging back to the nursing home on oral Levaquin.


11/28/21 07:06


Most recent lactic acid is 4.3 given that I will not send the patient back to 

the nursing home he will stay here.  As the patient had a contrast load and is 

on Metformin, his Metformin will be held for 2 days.  Case discussed with Dr. Hays who will accept.











Departure





- Departure


Time of Disposition: 07:10


Disposition: Admitted As Inpatient 66


Clinical Impression: 


 Dehydration, Sepsis





Pneumonia


Qualifiers:


 Pneumonia type: due to unspecified organism Laterality: right Lung location: 

lower lobe of lung Qualified Code(s): J18.1 - Lobar pneumonia, unspecified 

organism








- Discharge Information


Referrals: 


Regis Barajas MD [Primary Care Provider] - 


Forms:  ED Department Discharge


Additional Instructions: 


Return to the emergency room with any questions problems or worsening symptoms.





Discontinue Metformin resume on Tuesday, November 30.





Levaquin 750 mg daily for 7 days first dose given here in the emergency room.





Follow-up with regular healthcare provider towards the end of this week.





Sepsis Event Note (ED)





- Focused Exam


Vital Signs: 


                                   Vital Signs











  Temp Pulse Resp BP Pulse Ox


 


 11/28/21 06:00  37.4 C    


 


 11/28/21 00:30  36.2 C  100  20  118/83  90 L














- My Orders


Last 24 Hours: 


My Active Orders





11/28/21 00:42


Chest 1V Frontal [CR] Stat 





11/28/21 00:43


Blood Culture x2 Reflex Set [OM.PC] Stat 





11/28/21 00:56


BLOOD CULTURE [MREF] Stat 





11/28/21 01:07


BLOOD CULTURE [MREF] Stat 





11/28/21 02:00


Lactated Ringers [Ringers, Lactated] 1,000 ml IV ASDIRECTED 





11/28/21 02:01


Ang Chest [CT] Stat 





11/28/21 02:15


Sodium Chloride 0.9% [Normal Saline] 100 ml IV ASDIRECTED 





11/28/21 03:37


Ang Chest [CT] Stat 





11/28/21 03:45


Sodium Chloride 0.9% [Normal Saline] 100 ml IV ASDIRECTED 














- Assessment/Plan


Last 24 Hours: 


My Active Orders





11/28/21 00:42


Chest 1V Frontal [CR] Stat 





11/28/21 00:43


Blood Culture x2 Reflex Set [OM.PC] Stat 





11/28/21 00:56


BLOOD CULTURE [MREF] Stat 





11/28/21 01:07


BLOOD CULTURE [MREF] Stat 





11/28/21 02:00


Lactated Ringers [Ringers, Lactated] 1,000 ml IV ASDIRECTED 





11/28/21 02:01


Ang Chest [CT] Stat 





11/28/21 02:15


Sodium Chloride 0.9% [Normal Saline] 100 ml IV ASDIRECTED 





11/28/21 03:37


Ang Chest [CT] Stat 





11/28/21 03:45


Sodium Chloride 0.9% [Normal Saline] 100 ml IV ASDIRECTED

## 2021-11-28 NOTE — CT
CT chest

 

Technique: Multiple axial sections were obtained from above the lung 

apices inferiorly through the lung bases.  Intravenous contrast was 

utilized.  Study has been performed as a pulmonary angiogram protocol.

 

Comparison: Prior chest CT study performed earlier on the same day 

(2:28 AM).

 

Findings: Pulmonary arteries are better opacified.  No filling defects

 are seen to indicate pulmonary embolism.  Ascending aorta is slightly

 prominent with AP dimension of 4.0 cm.  Calcified lymph nodes are 

seen within the mediastinum.  No pericardial thickening is seen.  

Heart does not appear enlarged.  Stable cyst is noted within the right

 kidney.

 

Scattered areas of parenchymal density are seen within the right upper

 lung and within both lower lungs.  Stable calcified granuloma is seen

 within the lingula.

 

Bone window settings were reviewed which appear stable from prior 

exam.

 

Impression:

1.  No findings of pulmonary embolism.

2.  Patchy areas of increased density within the right upper and 

within both lower lungs.  As mentioned on prior exam, this is 

suggestive of either COVID-19 pneumonia or other types of pneumonia.

3.  Slight aneurysmal dilatation of the ascending aorta.

4.  Stable bony findings as well as other findings which are chronic.

 

Diagnostic code #3

 

I agree with preliminary report from [facility], finalized on 11/28/21, 5:43

 AM CST

## 2021-11-28 NOTE — CR
Chest: Portable view of the chest was obtained.

 

Comparison: Prior chest x-ray of 01/05/21.

 

Patchy increased density is seen within both lungs.  Small nodule is 

seen within the left lung base which appears to be calcified.  Upper 

lungs are clear.  Heart size and mediastinum are normal.  Old healed 

left clavicle fracture is seen which is healed.  No acute osseous 

finding is seen.

 

Impression:

1.  Slight density within both lung bases.  Findings are suspicious 

for mild areas of possible pneumonia if patient has infectious 

symptoms.

2.  Calcified granuloma within the left lung base.

3.  Incidental bony finding as noted above.

 

Diagnostic code #3

## 2021-11-28 NOTE — PCM.HP.2
H&P History of Present Illness





- General


Date of Service: 11/28/21


Admit Problem/Dx: 


                           Admission Diagnosis/Problem





Admission Diagnosis/Problem      Pneumonia








Source of Information: Patient, Old Records.  No: Family


History Limitations: Reports: Altered Mental Status





- History of Present Illness


Initial Comments - Free Text/Narative: 





The patient is a 61-year-old gentleman from a skilled nursing facility that was 

brought into the emergency department after he was found to have worsening cough

and low oxygen levels.  The patient has a significant psychiatric history and 

has been in a psychiatric ayala at [facility].  The patient is 

heavily medicated and somewhat confused.  He is a poor historian.  Most of the 

information is been taken from his previous charting as well as prior records.  

It was reported that the patient's oxygen levels were low around 80%.  Patient 

himself says that he has been having trouble breathing and feels short of 

breath.  The patient's COVID-19 testing was negative.


Onset of Symptoms: Reports: Unknown/Unsure


Duration of Symptoms: Reports: Day(s):


Location: Reports: Generalized


Quality: Reports: Ache


Improves with: Reports: None


Worsens with: Reports: None


Context: Reports: Sick Contact, Other (Nursing home)


Associated Symptoms: Reports: Cough, Fever/Chills, Loss of Appetite.  Denies: 

Nausea/Vomiting





- Related Data


Allergies/Adverse Reactions: 


                                    Allergies











Allergy/AdvReac Type Severity Reaction Status Date / Time


 


acetaminophen [From Tylenol] Allergy Severe Facial Verified 11/28/21 12:26





   Swelling  


 


Penicillins Allergy Severe Facial Verified 11/28/21 12:26





   Swelling  


 


naproxen [From Aleve] Allergy Mild Cannot Verified 11/28/21 12:26





   Remember  











Home Medications: 


                                    Home Meds





Losartan [Cozaar] 100 mg PO DAILY 06/15/19 [History]


atorvaSTATin [Lipitor] 20 mg PO BEDTIME 06/17/19 [History]


Calcium Carbonate [Tums] 500 mg PO BID 10/07/20 [History]


Cholecalciferol (Vitamin D3) [Vitamin D3] 1,000 unit PO DAILY 10/07/20 [History]


Furosemide [Lasix] 20 mg PO DAILY 10/07/20 [History]


Ibuprofen 600 mg PO Q6HR PRN 10/07/20 [History]


LORazepam [Ativan] 0.5 mg PO QID 10/07/20 [History]


Melatonin 9 mg PO BEDTIME 10/07/20 [History]


Mirtazapine [Remeron] 7.5 mg PO BEDTIME 10/07/20 [History]


Multivit-Min/FA/Lycopen/Lutein [Certavite Sr with Lutein Tab] 1 each PO DAILY 

10/07/20 [History]


bisacodyL [Dulcolax] 5 mg PO BID PRN 10/07/20 [History]


polyethylene glycoL 3350 [MiraLAX] 17 gm PO DAILY PRN 10/07/20 [History]


risperiDONE [Risperdal] 3 mg PO BID 10/07/20 [History]


Ipratropium/Albuterol Sulfate [Iprat-Albut 0.5-3(2.5) MG/3 ML] 1 inhalation INH 

QID PRN 12/07/20 [History]


Magnesium Hydroxide [Milk of Magnesia] 30 ml PO BEDTIME 01/05/21 [History]


metFORMIN [Glucophage XR] 1,000 mg PO BID 01/05/21 [History]


Savoy/Min Oil/Kiana/Wool Alcoh [Eucerin Creme] 1 dose TOP BID 11/28/21 [History]


Menthol/Selenium Sulfide [Selsun Blue 1% Shampoo] 118 ml .XX MOTH 11/28/21 

[History]


OLANZapine [ZyPREXA] 10 mg PO DAILY 11/28/21 [History]


Tamsulosin [Tamsulosin 24 Hr] 0.4 mg PO DAILY 11/28/21 [History]


fluvoxaMINE [fluvoxaMINE Maleate] 150 mg PO BID 11/28/21 [History]











Past Medical History


HEENT History: Reports: Other (See Below)


Cardiovascular History: Reports: High Cholesterol, Hypertension


Respiratory History: Reports: Other (See Below)


Other Respiratory History: acute respiratory disease (+ COVID)


Gastrointestinal History: Reports: Chronic Constipation


Other Gastrointestinal History: polyphagia, dysphagia


Genitourinary History: Reports: BPH


Musculoskeletal History: Reports: None


Neurological History: Reports: Alzheimers Disease, Other (See Below)


Other Neuro History: restlessness/agitation


Psychiatric History: Reports: Anxiety, Psychosis, Other (See Below)


Other Psychiatric History: delusions, wandering, hoarding disorder, mild 

intellectual disabilities


Endocrine/Metabolic History: Reports: Diabetes, Type II


Hematologic History: Reports: None


Immunologic History: Reports: None


Oncologic (Cancer) History: Reports: None


Dermatologic History: Reports: Other (See Below)


Other Dermatologic History: xerosis cutis





- Infectious Disease History


Infectious Disease History: Reports: Chicken Pox, Measles, Mumps, Novel 

Coronavirus





- Past Surgical History


Head Surgeries/Procedures: Reports: None


HEENT Surgical History: Reports: None


Cardiovascular Surgical History: Reports: None


Respiratory Surgical History: Reports: None


GI Surgical History: Reports: None


Male  Surgical History: Reports: None


Endocrine Surgical History: Reports: None


Neurological Surgical History: Reports: None


Musculoskeletal Surgical History: Reports: ORIF


Other Musculoskeletal Surgeries/Procedures:: ORIF of Right Foot. Brace in place


Oncologic Surgical History: Reports: None


Dermatological Surgical History: Reports: None





Social & Family History





- Family History


Family Medical History: No Pertinent Family History





- Tobacco Use


Tobacco Use Status *Q: Never Tobacco User


Second Hand Smoke Exposure: No





- Caffeine Use


Caffeine Use: Reports: Coffee





- Recreational Drug Use


Recreational Drug Use: No





- Living Situation & Occupation


Living situation: Reports: Single, Extended Care Facility ([facility])


Occupation: Disabled





H&P Review of Systems





- Review of Systems:


Review Of Systems: Unable To Obtain


Reason Not Obtained: The patient is confused and medicated for his psychiatric 

issues.





Exam





- Exam


Exam: See Below





- Vital Signs


Vital Signs: 


                                Last Vital Signs











Temp  37.4 C   11/28/21 06:00


 


Pulse  100   11/28/21 00:30


 


Resp  20   11/28/21 00:30


 


BP  118/83   11/28/21 00:30


 


Pulse Ox  90 L  11/28/21 00:30











Weight: 78.562 kg





- Exam


Quality Assessment: Supplemental Oxygen, DVT Prophylaxis


General: Alert, Oriented, Sedated


HEENT: Conjunctiva Clear, EACs Clear, EOMI, Nares Patent, Posterior Pharynx 

Clear.  No: Mucosa Moist & Pink (Dry)


Neck: Supple, Trachea Midline


Lungs: Decreased Breath Sounds, Crackles (Widely scattered)


Cardiovascular: Regular Rhythm, Tachycardia


GI/Abdominal Exam: Normal Bowel Sounds, Soft, Non-Tender, No Distention


 (Male) Exam: Deferred


Rectal (Males) Exam: Deferred


Back Exam: Normal Inspection, Full Range of Motion


Extremities: Normal Inspection, No Pedal Edema


Skin: Warm, Dry, Intact


Neuro Extensive - Mental Status: Alert, Oriented x3


Psychiatric: Alert, Normal Affect





- Patient Data


Lab Results Last 24 hrs: 


                         Laboratory Results - last 24 hr











  11/28/21 11/28/21 11/28/21 Range/Units





  00:56 00:56 00:56 


 


WBC  12.53 H    (4.23-9.07)  K/mm3


 


RBC  4.06 L    (4.63-6.08)  M/mm3


 


Hgb  12.1 L    (13.7-17.5)  gm/dl


 


Hct  38.7 L    (40.1-51.0)  %


 


MCV  95.3 H    (79.0-92.2)  fl


 


MCH  29.8    (25.7-32.2)  pg


 


MCHC  31.3 L    (32.2-35.5)  g/dl


 


RDW Std Deviation  46.1 H    (35.1-43.9)  fL


 


Plt Count  226    (163-337)  K/mm3


 


MPV  10.1    (9.4-12.3)  fl


 


Neut % (Auto)  87.5 H    (34.0-67.9)  %


 


Lymph % (Auto)  5.8 L    (21.8-53.1)  %


 


Mono % (Auto)  6.0    (5.3-12.2)  %


 


Eos % (Auto)  0.1 L    (0.8-7.0)  


 


Baso % (Auto)  0.2    (0.1-1.2)  %


 


Neut # (Auto)  10.97 H    (1.78-5.38)  K/mm3


 


Lymph # (Auto)  0.73 L    (1.32-3.57)  K/mm3


 


Mono # (Auto)  0.75    (0.30-0.82)  K/mm3


 


Eos # (Auto)  0.01 L    (0.04-0.54)  K/mm3


 


Baso # (Auto)  0.02    (0.01-0.08)  K/mm3


 


PT     (9.7-12.0)  SECONDS


 


INR     


 


APTT     (21.7-31.4)  SECONDS


 


D-Dimer, Quantitative     (0.19-0.50)  mg/L


 


Sodium   141   (136-145)  mEq/L


 


Potassium   4.3   (3.5-5.1)  mEq/L


 


Chloride   102   ()  mEq/L


 


Carbon Dioxide   20 L   (21-32)  mEq/L


 


Anion Gap   23.3 H   (5-15)  


 


BUN   24 H   (7-18)  mg/dL


 


Creatinine   0.9   (0.7-1.3)  mg/dL


 


Est Cr Clr Drug Dosing   TNP   


 


Estimated GFR (MDRD)   > 60   (>60)  mL/min


 


BUN/Creatinine Ratio   26.7 H   (14-18)  


 


Glucose   206 H   (70-99)  mg/dL


 


Lactic Acid    10.0 H*  (0.4-2.0)  mmol/L


 


Calcium   10.0   (8.5-10.1)  mg/dL


 


Total Bilirubin   0.4   (0.2-1.0)  mg/dL


 


AST   18   (15-37)  U/L


 


ALT   23   (16-63)  U/L


 


Alkaline Phosphatase   79   ()  U/L


 


Troponin I     (0.00-0.056)  ng/mL


 


Total Protein   7.8   (6.4-8.2)  g/dl


 


Albumin   3.7   (3.4-5.0)  g/dl


 


Globulin   4.1   gm/dL


 


Albumin/Globulin Ratio   0.9 L   (1-2)  


 


Mycoplasma pneumon IgM     (NEGATIVE)  


 


SARS-CoV-2 RNA (LONG)     (NEGATIVE)  


 


MRSA (PCR)     














  11/28/21 11/28/21 11/28/21 Range/Units





  00:56 00:56 00:56 


 


WBC     (4.23-9.07)  K/mm3


 


RBC     (4.63-6.08)  M/mm3


 


Hgb     (13.7-17.5)  gm/dl


 


Hct     (40.1-51.0)  %


 


MCV     (79.0-92.2)  fl


 


MCH     (25.7-32.2)  pg


 


MCHC     (32.2-35.5)  g/dl


 


RDW Std Deviation     (35.1-43.9)  fL


 


Plt Count     (163-337)  K/mm3


 


MPV     (9.4-12.3)  fl


 


Neut % (Auto)     (34.0-67.9)  %


 


Lymph % (Auto)     (21.8-53.1)  %


 


Mono % (Auto)     (5.3-12.2)  %


 


Eos % (Auto)     (0.8-7.0)  


 


Baso % (Auto)     (0.1-1.2)  %


 


Neut # (Auto)     (1.78-5.38)  K/mm3


 


Lymph # (Auto)     (1.32-3.57)  K/mm3


 


Mono # (Auto)     (0.30-0.82)  K/mm3


 


Eos # (Auto)     (0.04-0.54)  K/mm3


 


Baso # (Auto)     (0.01-0.08)  K/mm3


 


PT    10.9  (9.7-12.0)  SECONDS


 


INR    0.98  


 


APTT    23.6  (21.7-31.4)  SECONDS


 


D-Dimer, Quantitative  1.01 H    (0.19-0.50)  mg/L


 


Sodium     (136-145)  mEq/L


 


Potassium     (3.5-5.1)  mEq/L


 


Chloride     ()  mEq/L


 


Carbon Dioxide     (21-32)  mEq/L


 


Anion Gap     (5-15)  


 


BUN     (7-18)  mg/dL


 


Creatinine     (0.7-1.3)  mg/dL


 


Est Cr Clr Drug Dosing     


 


Estimated GFR (MDRD)     (>60)  mL/min


 


BUN/Creatinine Ratio     (14-18)  


 


Glucose     (70-99)  mg/dL


 


Lactic Acid     (0.4-2.0)  mmol/L


 


Calcium     (8.5-10.1)  mg/dL


 


Total Bilirubin     (0.2-1.0)  mg/dL


 


AST     (15-37)  U/L


 


ALT     (16-63)  U/L


 


Alkaline Phosphatase     ()  U/L


 


Troponin I   < 0.017   (0.00-0.056)  ng/mL


 


Total Protein     (6.4-8.2)  g/dl


 


Albumin     (3.4-5.0)  g/dl


 


Globulin     gm/dL


 


Albumin/Globulin Ratio     (1-2)  


 


Mycoplasma pneumon IgM     (NEGATIVE)  


 


SARS-CoV-2 RNA (LONG)     (NEGATIVE)  


 


MRSA (PCR)     














  11/28/21 11/28/21 11/28/21 Range/Units





  00:56 02:42 02:46 


 


WBC     (4.23-9.07)  K/mm3


 


RBC     (4.63-6.08)  M/mm3


 


Hgb     (13.7-17.5)  gm/dl


 


Hct     (40.1-51.0)  %


 


MCV     (79.0-92.2)  fl


 


MCH     (25.7-32.2)  pg


 


MCHC     (32.2-35.5)  g/dl


 


RDW Std Deviation     (35.1-43.9)  fL


 


Plt Count     (163-337)  K/mm3


 


MPV     (9.4-12.3)  fl


 


Neut % (Auto)     (34.0-67.9)  %


 


Lymph % (Auto)     (21.8-53.1)  %


 


Mono % (Auto)     (5.3-12.2)  %


 


Eos % (Auto)     (0.8-7.0)  


 


Baso % (Auto)     (0.1-1.2)  %


 


Neut # (Auto)     (1.78-5.38)  K/mm3


 


Lymph # (Auto)     (1.32-3.57)  K/mm3


 


Mono # (Auto)     (0.30-0.82)  K/mm3


 


Eos # (Auto)     (0.04-0.54)  K/mm3


 


Baso # (Auto)     (0.01-0.08)  K/mm3


 


PT     (9.7-12.0)  SECONDS


 


INR     


 


APTT     (21.7-31.4)  SECONDS


 


D-Dimer, Quantitative     (0.19-0.50)  mg/L


 


Sodium     (136-145)  mEq/L


 


Potassium     (3.5-5.1)  mEq/L


 


Chloride     ()  mEq/L


 


Carbon Dioxide     (21-32)  mEq/L


 


Anion Gap     (5-15)  


 


BUN     (7-18)  mg/dL


 


Creatinine     (0.7-1.3)  mg/dL


 


Est Cr Clr Drug Dosing     


 


Estimated GFR (MDRD)     (>60)  mL/min


 


BUN/Creatinine Ratio     (14-18)  


 


Glucose     (70-99)  mg/dL


 


Lactic Acid     (0.4-2.0)  mmol/L


 


Calcium     (8.5-10.1)  mg/dL


 


Total Bilirubin     (0.2-1.0)  mg/dL


 


AST     (15-37)  U/L


 


ALT     (16-63)  U/L


 


Alkaline Phosphatase     ()  U/L


 


Troponin I     (0.00-0.056)  ng/mL


 


Total Protein     (6.4-8.2)  g/dl


 


Albumin     (3.4-5.0)  g/dl


 


Globulin     gm/dL


 


Albumin/Globulin Ratio     (1-2)  


 


Mycoplasma pneumon IgM  Negative    (NEGATIVE)  


 


SARS-CoV-2 RNA (LONG)    Negative  (NEGATIVE)  


 


MRSA (PCR)   Negative   














  11/28/21 11/28/21 11/28/21 Range/Units





  03:40 06:09 06:09 


 


WBC     (4.23-9.07)  K/mm3


 


RBC     (4.63-6.08)  M/mm3


 


Hgb     (13.7-17.5)  gm/dl


 


Hct     (40.1-51.0)  %


 


MCV     (79.0-92.2)  fl


 


MCH     (25.7-32.2)  pg


 


MCHC     (32.2-35.5)  g/dl


 


RDW Std Deviation     (35.1-43.9)  fL


 


Plt Count     (163-337)  K/mm3


 


MPV     (9.4-12.3)  fl


 


Neut % (Auto)     (34.0-67.9)  %


 


Lymph % (Auto)     (21.8-53.1)  %


 


Mono % (Auto)     (5.3-12.2)  %


 


Eos % (Auto)     (0.8-7.0)  


 


Baso % (Auto)     (0.1-1.2)  %


 


Neut # (Auto)     (1.78-5.38)  K/mm3


 


Lymph # (Auto)     (1.32-3.57)  K/mm3


 


Mono # (Auto)     (0.30-0.82)  K/mm3


 


Eos # (Auto)     (0.04-0.54)  K/mm3


 


Baso # (Auto)     (0.01-0.08)  K/mm3


 


PT     (9.7-12.0)  SECONDS


 


INR     


 


APTT     (21.7-31.4)  SECONDS


 


D-Dimer, Quantitative     (0.19-0.50)  mg/L


 


Sodium   138   (136-145)  mEq/L


 


Potassium   4.0   (3.5-5.1)  mEq/L


 


Chloride   102   ()  mEq/L


 


Carbon Dioxide   24   (21-32)  mEq/L


 


Anion Gap   16.0 H   (5-15)  


 


BUN   20 H   (7-18)  mg/dL


 


Creatinine   0.8   (0.7-1.3)  mg/dL


 


Est Cr Clr Drug Dosing   93.81   


 


Estimated GFR (MDRD)   > 60   (>60)  mL/min


 


BUN/Creatinine Ratio   25.0 H   (14-18)  


 


Glucose   156 H   (70-99)  mg/dL


 


Lactic Acid  4.3 H*   4.3 H*  (0.4-2.0)  mmol/L


 


Calcium   8.8   (8.5-10.1)  mg/dL


 


Total Bilirubin     (0.2-1.0)  mg/dL


 


AST     (15-37)  U/L


 


ALT     (16-63)  U/L


 


Alkaline Phosphatase     ()  U/L


 


Troponin I     (0.00-0.056)  ng/mL


 


Total Protein     (6.4-8.2)  g/dl


 


Albumin     (3.4-5.0)  g/dl


 


Globulin     gm/dL


 


Albumin/Globulin Ratio     (1-2)  


 


Mycoplasma pneumon IgM     (NEGATIVE)  


 


SARS-CoV-2 RNA (LONG)     (NEGATIVE)  


 


MRSA (PCR)     














  11/28/21 Range/Units





  09:35 


 


WBC   (4.23-9.07)  K/mm3


 


RBC   (4.63-6.08)  M/mm3


 


Hgb   (13.7-17.5)  gm/dl


 


Hct   (40.1-51.0)  %


 


MCV   (79.0-92.2)  fl


 


MCH   (25.7-32.2)  pg


 


MCHC   (32.2-35.5)  g/dl


 


RDW Std Deviation   (35.1-43.9)  fL


 


Plt Count   (163-337)  K/mm3


 


MPV   (9.4-12.3)  fl


 


Neut % (Auto)   (34.0-67.9)  %


 


Lymph % (Auto)   (21.8-53.1)  %


 


Mono % (Auto)   (5.3-12.2)  %


 


Eos % (Auto)   (0.8-7.0)  


 


Baso % (Auto)   (0.1-1.2)  %


 


Neut # (Auto)   (1.78-5.38)  K/mm3


 


Lymph # (Auto)   (1.32-3.57)  K/mm3


 


Mono # (Auto)   (0.30-0.82)  K/mm3


 


Eos # (Auto)   (0.04-0.54)  K/mm3


 


Baso # (Auto)   (0.01-0.08)  K/mm3


 


PT   (9.7-12.0)  SECONDS


 


INR   


 


APTT   (21.7-31.4)  SECONDS


 


D-Dimer, Quantitative   (0.19-0.50)  mg/L


 


Sodium   (136-145)  mEq/L


 


Potassium   (3.5-5.1)  mEq/L


 


Chloride   ()  mEq/L


 


Carbon Dioxide   (21-32)  mEq/L


 


Anion Gap   (5-15)  


 


BUN   (7-18)  mg/dL


 


Creatinine   (0.7-1.3)  mg/dL


 


Est Cr Clr Drug Dosing   


 


Estimated GFR (MDRD)   (>60)  mL/min


 


BUN/Creatinine Ratio   (14-18)  


 


Glucose   (70-99)  mg/dL


 


Lactic Acid  2.9 H*  (0.4-2.0)  mmol/L


 


Calcium   (8.5-10.1)  mg/dL


 


Total Bilirubin   (0.2-1.0)  mg/dL


 


AST   (15-37)  U/L


 


ALT   (16-63)  U/L


 


Alkaline Phosphatase   ()  U/L


 


Troponin I   (0.00-0.056)  ng/mL


 


Total Protein   (6.4-8.2)  g/dl


 


Albumin   (3.4-5.0)  g/dl


 


Globulin   gm/dL


 


Albumin/Globulin Ratio   (1-2)  


 


Mycoplasma pneumon IgM   (NEGATIVE)  


 


SARS-CoV-2 RNA (LONG)   (NEGATIVE)  


 


MRSA (PCR)   











Result Diagrams: 


                                 11/28/21 00:56





                                 11/28/21 06:09





Sepsis Event Note





- Focused Exam


Vital Signs: 


                                   Vital Signs











  Temp Pulse Resp BP Pulse Ox


 


 11/28/21 06:00  37.4 C    


 


 11/28/21 00:30  36.2 C  100  20  118/83  90 L














- Problem List


(1) Acute respiratory failure


SNOMED Code(s): 46618668


   ICD Code: J96.00 - ACUTE RESPIRATORY FAILURE, UNSP W HYPOXIA OR HYPERCAPNIA  

 Status: Acute   Priority: High   Current Visit: Yes   


Qualifiers: 


   Respiratory failure complication: hypoxia   Qualified Code(s): J96.01 - Acute

 respiratory failure with hypoxia   





(2) Pneumonia


SNOMED Code(s): 817304964


   ICD Code: J18.9 - PNEUMONIA, UNSPECIFIED ORGANISM   Status: Acute   Priority:

 High   Current Visit: Yes   


Qualifiers: 


   Pneumonia type: due to unspecified organism   Laterality: unspecified 

laterality   Lung location: lower lobe of lung   Qualified Code(s): J18.9 - 

Pneumonia, unspecified organism   





(3) Delayed emotional development


SNOMED Code(s): 640290758


   ICD Code: F88 - OTHER DISORDERS OF PSYCHOLOGICAL DEVELOPMENT   Status: 

Chronic   Priority: Medium   Current Visit: Yes   





(4) Diabetes mellitus type 2 in obese


SNOMED Code(s): 74906898


   ICD Code: E11.69 - TYPE 2 DIABETES MELLITUS WITH OTHER SPECIFIED COMPLICATIO

N; E66.9 - OBESITY, UNSPECIFIED   Status: Chronic   Priority: High   Current 

Visit: Yes   





(5) Hypertension


SNOMED Code(s): 61035998


   ICD Code: I10 - ESSENTIAL (PRIMARY) HYPERTENSION   Status: Chronic   

Priority: High   Current Visit: Yes   


Qualifiers: 


   Hypertension type: primary hypertension   Qualified Code(s): I10 - Essential 

(primary) hypertension   





(6) Total self-care deficit


SNOMED Code(s): 18902039


   ICD Code: R41.89 - OTH SYMPTOMS AND SIGNS W COGNITIVE FUNCTIONS AND AWARENESS

   Status: Chronic   Priority: High   Current Visit: Yes   


Problem List Initiated/Reviewed/Updated: Yes


Orders Last 24hrs: 


                               Active Orders 24 hr











 Category Date Time Status


 


 Patient Status [ADT] Routine ADT  11/28/21 10:47 Active


 


 BLOOD CULTURE [MREF] Stat Lab  11/28/21 00:56 Received


 


 BLOOD CULTURE [MREF] Stat Lab  11/28/21 01:07 Received


 


 REFLEX LACTIC ACID YES OR NO [CHEM] Routine Lab  11/28/21 10:08 Received


 


 Lactated Ringers [Ringers, Lactated] 1,000 ml Med  11/28/21 02:00 Active





 IV ASDIRECTED   


 


 Sodium Chloride 0.9% [Normal Saline] 100 ml Med  11/28/21 02:15 Active





 IV ASDIRECTED   


 


 Sodium Chloride 0.9% [Normal Saline] 100 ml Med  11/28/21 03:45 Active





 IV ASDIRECTED   


 


 Blood Culture x2 Reflex Set [OM.PC] Stat Oth  11/28/21 00:43 Ordered








                                Medication Orders





Lactated Ringer's (Ringers, Lactated)  1,000 mls @ 150 mls/hr IV ASDIRECTED LEXY


   Last Infusion: 11/28/21 04:38  Dose: 999 mls/hr


   Documented by: ALLY


   Admin: 11/28/21 03:42  Dose: 150 mls/hr


   Documented by: ALLY


Sodium Chloride (Normal Saline)  100 mls @ 70 mls/min IV ASDIRECTED LEXY


   Last Admin: 11/28/21 02:41  Dose: 70 mls/min


   Documented by: MIKEY


Sodium Chloride (Normal Saline)  100 mls @ 70 mls/min IV ASDIRECTED LEXY


   Last Admin: 11/28/21 04:20  Dose: 70 mls/min


   Documented by: MIKEY








Assessment/Plan Comment:: 





The patient is a 61-year-old gentleman who is a resident of psychiatric facility

 at [facility].  Clearly because of the skilled needs 

the patient has been admitted as an inpatient.  He has been admitted through the

 emergency department with acute respiratory failure due to pneumonia.  He has 

been tested negative for COVID-19 infection.  The patient had been started on 

Levaquin.  The patient will also be kept on Levaquin 500 mg IV daily starting 

tomorrow.  The patient will be on oxygen to help keep his saturations around 90 

to 92%.  He is also diabetic and I have ordered a diabetic diet for him.  He 

will have insulin sliding scale.  The patient's blood sugar will be checked 3 

times a day and at bedtime.  He is also anticoagulated with the use of Lovenox 

40 mg subcutaneous daily for the DVT prophylaxis.  PT OT has been ordered for 

the patient.  Once the patient's oxygen demands have improved he will likely be 

appropriate for return to psychiatric facility.





- Mortality Measure


Prognosis:: Poor

## 2021-11-28 NOTE — CT
CT chest

 

Technique: Multiple axial sections were obtained from above the lung 

apices inferiorly through the lung bases.  Intravenous contrast was 

utilized.  Study performed as a pulmonary angiogram protocol.

 

Comparison: Prior chest x-ray performed earlier on the same day (01:02

 AM).

 

Findings: Pulmonary arteries are not optimally opacified but no 

definite filling defects to indicate pulmonary embolism.  Ascending 

aorta is slightly aneurysmal with measurement of 4.0 cm.  Descending 

aorta is normal.  Calcified mediastinal lymph nodes are seen which are

 benign.  No pericardial thickening is seen.  Visualized upper 

abdominal structures shows a cyst within the right kidney measuring 

2.3 cm.  Small hiatal hernia is noted.

 

Lung window settings were reviewed.  Patchy areas of increased density

 are seen within the right upper lung as well as within both lower 

lungs.  Calcified nodule is noted within the lingula compatible with a

 granuloma measuring 1.1 cm.

 

Bone window settings were reviewed which show scattered disc space 

narrowing within the thoracic spine with endplate spurring and mild 

kyphosis.  Several old rib fractures are seen within the left chest 

which appear to be healed.  Old healed left clavicle fracture is 

noted.

 

Impression:

1.  No definite findings of pulmonary embolism.

2.  Patchy areas of increased density within the right upper lung and 

within both lower lungs which could represent mild areas of COVID-19 

pneumonia or other areas of pneumonia.  Please correlate if patient 

has infectious symptoms.

3.  Slightly enlarged ascending aorta with AP dimension of 4.0 cm 

compatible with mild aneurysm.

4.  Other findings as noted above which are chronic.  

 

Diagnostic code #3

 

I mostly agree with preliminary report from [facility] (ascending aorta 

finding as noted above), finalized on 11/28/21, 4:11 AM CST, code 2

## 2021-11-29 RX ADMIN — Medication SCH APPLIC: at 09:17

## 2021-11-29 RX ADMIN — MAGNESIUM HYDROXIDE SCH ML: 400 SUSPENSION ORAL at 20:27

## 2021-11-29 RX ADMIN — SODIUM CHLORIDE SCH: 9 INJECTION, SOLUTION INTRAVENOUS at 12:03

## 2021-11-29 RX ADMIN — SODIUM CHLORIDE SCH UNIT: 9 INJECTION, SOLUTION INTRAVENOUS at 19:17

## 2021-11-29 RX ADMIN — SODIUM CHLORIDE SCH: 9 INJECTION, SOLUTION INTRAVENOUS at 09:18

## 2021-11-29 RX ADMIN — MAGNESIUM HYDROXIDE SCH: 400 SUSPENSION ORAL at 20:59

## 2021-11-29 RX ADMIN — LEVOFLOXACIN SCH MLS/HR: 500 INJECTION, SOLUTION INTRAVENOUS at 02:32

## 2021-11-29 RX ADMIN — Medication SCH APPLIC: at 20:32

## 2021-11-29 NOTE — PCM.PN
- General Info


Date of Service: 11/29/21


Admission Dx/Problem (Free Text): 


                           Admission Diagnosis/Problem





Admission Diagnosis/Problem      Pneumonia








Subjective Update: 





The patient is a 61-year-old gentleman who had presented from skilled nursing 

facility to the emergency department due to worsening cough and low oxygen 

levels.  The the patient has a significant psychiatric history.  He is somewhat 

responsive but he is medicated.  The patient is tolerating diet.  The patient 

replies primarily with shaking and nodding of head although he can speak slowly.


Functional Status: Reports: Pain Controlled, Tolerating Diet





- Review of Systems


Systems Review Comment:: 





The patient's review of system is not reliable due to his psychiatric illness 

and medications.





- Patient Data


Vitals - Most Recent: 


                                Last Vital Signs











Temp  36.9 C   11/29/21 05:36


 


Pulse  81   11/29/21 05:40


 


Resp  18   11/29/21 05:36


 


BP  125/93 H  11/29/21 05:36


 


Pulse Ox  95   11/29/21 05:40











Weight - Most Recent: 81.102 kg


I&O - Last 24 Hours: 


                                 Intake & Output











 11/28/21 11/29/21 11/29/21





 22:59 06:59 14:59


 


Intake Total 320 100 


 


Balance 320 100 











Lab Results Last 24 Hours: 


                         Laboratory Results - last 24 hr











  11/28/21 11/28/21 11/28/21 Range/Units





  09:35 12:28 14:36 


 


WBC     (4.23-9.07)  K/mm3


 


RBC     (4.63-6.08)  M/mm3


 


Hgb     (13.7-17.5)  gm/dl


 


Hct     (40.1-51.0)  %


 


MCV     (79.0-92.2)  fl


 


MCH     (25.7-32.2)  pg


 


MCHC     (32.2-35.5)  g/dl


 


RDW Std Deviation     (35.1-43.9)  fL


 


Plt Count     (163-337)  K/mm3


 


MPV     (9.4-12.3)  fl


 


Neut % (Auto)     (34.0-67.9)  %


 


Lymph % (Auto)     (21.8-53.1)  %


 


Mono % (Auto)     (5.3-12.2)  %


 


Eos % (Auto)     (0.8-7.0)  


 


Baso % (Auto)     (0.1-1.2)  %


 


Neut # (Auto)     (1.78-5.38)  K/mm3


 


Lymph # (Auto)     (1.32-3.57)  K/mm3


 


Mono # (Auto)     (0.30-0.82)  K/mm3


 


Eos # (Auto)     (0.04-0.54)  K/mm3


 


Baso # (Auto)     (0.01-0.08)  K/mm3


 


D-Dimer, Quantitative     (0.19-0.50)  mg/L


 


Sodium     (136-145)  mEq/L


 


Potassium     (3.5-5.1)  mEq/L


 


Chloride     ()  mEq/L


 


Carbon Dioxide     (21-32)  mEq/L


 


Anion Gap     (5-15)  


 


BUN     (7-18)  mg/dL


 


Creatinine     (0.7-1.3)  mg/dL


 


Est Cr Clr Drug Dosing     mL/min


 


Estimated GFR (MDRD)     (>60)  mL/min


 


BUN/Creatinine Ratio     (14-18)  


 


Glucose     (70-99)  mg/dL


 


POC Glucose    154 H  (70-99)  mg/dL


 


Lactic Acid  2.9 H*  2.4 H*   (0.4-2.0)  mmol/L


 


Calcium     (8.5-10.1)  mg/dL


 


Magnesium     (1.8-2.4)  mg/dL


 


Total Bilirubin     (0.2-1.0)  mg/dL


 


AST     (15-37)  U/L


 


ALT     (16-63)  U/L


 


Alkaline Phosphatase     ()  U/L


 


Total Protein     (6.4-8.2)  g/dl


 


Albumin     (3.4-5.0)  g/dl


 


Globulin     gm/dL


 


Albumin/Globulin Ratio     (1-2)  














  11/28/21 11/28/21 11/29/21 Range/Units





  15:48 16:44 06:30 


 


WBC    8.56  (4.23-9.07)  K/mm3


 


RBC    3.19 L  (4.63-6.08)  M/mm3


 


Hgb    9.5 L D  (13.7-17.5)  gm/dl


 


Hct    31.0 L  (40.1-51.0)  %


 


MCV    97.2 H  (79.0-92.2)  fl


 


MCH    29.8  (25.7-32.2)  pg


 


MCHC    30.6 L  (32.2-35.5)  g/dl


 


RDW Std Deviation    49.8 H  (35.1-43.9)  fL


 


Plt Count    173  (163-337)  K/mm3


 


MPV    9.7  (9.4-12.3)  fl


 


Neut % (Auto)    74.9 H  (34.0-67.9)  %


 


Lymph % (Auto)    12.7 L  (21.8-53.1)  %


 


Mono % (Auto)    9.9  (5.3-12.2)  %


 


Eos % (Auto)    1.9  (0.8-7.0)  


 


Baso % (Auto)    0.2  (0.1-1.2)  %


 


Neut # (Auto)    6.41 H  (1.78-5.38)  K/mm3


 


Lymph # (Auto)    1.09 L  (1.32-3.57)  K/mm3


 


Mono # (Auto)    0.85 H  (0.30-0.82)  K/mm3


 


Eos # (Auto)    0.16  (0.04-0.54)  K/mm3


 


Baso # (Auto)    0.02  (0.01-0.08)  K/mm3


 


D-Dimer, Quantitative     (0.19-0.50)  mg/L


 


Sodium     (136-145)  mEq/L


 


Potassium     (3.5-5.1)  mEq/L


 


Chloride     ()  mEq/L


 


Carbon Dioxide     (21-32)  mEq/L


 


Anion Gap     (5-15)  


 


BUN     (7-18)  mg/dL


 


Creatinine     (0.7-1.3)  mg/dL


 


Est Cr Clr Drug Dosing     mL/min


 


Estimated GFR (MDRD)     (>60)  mL/min


 


BUN/Creatinine Ratio     (14-18)  


 


Glucose     (70-99)  mg/dL


 


POC Glucose   131 H   (70-99)  mg/dL


 


Lactic Acid  1.8    (0.4-2.0)  mmol/L


 


Calcium     (8.5-10.1)  mg/dL


 


Magnesium     (1.8-2.4)  mg/dL


 


Total Bilirubin     (0.2-1.0)  mg/dL


 


AST     (15-37)  U/L


 


ALT     (16-63)  U/L


 


Alkaline Phosphatase     ()  U/L


 


Total Protein     (6.4-8.2)  g/dl


 


Albumin     (3.4-5.0)  g/dl


 


Globulin     gm/dL


 


Albumin/Globulin Ratio     (1-2)  














  11/29/21 11/29/21 11/29/21 Range/Units





  06:30 06:30 07:01 


 


WBC     (4.23-9.07)  K/mm3


 


RBC     (4.63-6.08)  M/mm3


 


Hgb     (13.7-17.5)  gm/dl


 


Hct     (40.1-51.0)  %


 


MCV     (79.0-92.2)  fl


 


MCH     (25.7-32.2)  pg


 


MCHC     (32.2-35.5)  g/dl


 


RDW Std Deviation     (35.1-43.9)  fL


 


Plt Count     (163-337)  K/mm3


 


MPV     (9.4-12.3)  fl


 


Neut % (Auto)     (34.0-67.9)  %


 


Lymph % (Auto)     (21.8-53.1)  %


 


Mono % (Auto)     (5.3-12.2)  %


 


Eos % (Auto)     (0.8-7.0)  


 


Baso % (Auto)     (0.1-1.2)  %


 


Neut # (Auto)     (1.78-5.38)  K/mm3


 


Lymph # (Auto)     (1.32-3.57)  K/mm3


 


Mono # (Auto)     (0.30-0.82)  K/mm3


 


Eos # (Auto)     (0.04-0.54)  K/mm3


 


Baso # (Auto)     (0.01-0.08)  K/mm3


 


D-Dimer, Quantitative  0.59 H    (0.19-0.50)  mg/L


 


Sodium   141   (136-145)  mEq/L


 


Potassium   3.9   (3.5-5.1)  mEq/L


 


Chloride   106   ()  mEq/L


 


Carbon Dioxide   32   (21-32)  mEq/L


 


Anion Gap   6.9   (5-15)  


 


BUN   14   (7-18)  mg/dL


 


Creatinine   0.7   (0.7-1.3)  mg/dL


 


Est Cr Clr Drug Dosing   107.21   mL/min


 


Estimated GFR (MDRD)   > 60   (>60)  mL/min


 


BUN/Creatinine Ratio   20.0 H   (14-18)  


 


Glucose   126 H   (70-99)  mg/dL


 


POC Glucose    118 H  (70-99)  mg/dL


 


Lactic Acid     (0.4-2.0)  mmol/L


 


Calcium   8.5   (8.5-10.1)  mg/dL


 


Magnesium   2.1   (1.8-2.4)  mg/dL


 


Total Bilirubin   0.5   (0.2-1.0)  mg/dL


 


AST   12 L   (15-37)  U/L


 


ALT   17   (16-63)  U/L


 


Alkaline Phosphatase   61   ()  U/L


 


Total Protein   5.6 L   (6.4-8.2)  g/dl


 


Albumin   2.8 L   (3.4-5.0)  g/dl


 


Globulin   2.8   gm/dL


 


Albumin/Globulin Ratio   1.0   (1-2)  











Jamel Results Last 24 Hours: 


                                  Microbiology











 11/28/21 01:07 Blood Culture - Preliminary





 Blood - Venous - Lab Draw 


 


 11/28/21 00:56 Blood Culture - Preliminary





 Blood - Venous 











Med Orders - Current: 


                               Current Medications





Albuterol/Ipratropium (Albuterol/Ipratropium 3.0-0.5 Mg/3 Ml Neb Soln)  3 ml NEB

Q4H PRN


   PRN Reason: Shortness Of Breath/wheezing


Enoxaparin Sodium (Enoxaparin 40 Mg/0.4 Ml Syringe)  40 mg SUBCUT DAILY [facility]


Fluvoxamine Maleate (Fluvoxamine 50 Mg Tab)  150 mg PO BID [facility]


   Last Admin: 11/28/21 20:27 Dose:  150 mg


   Documented by: 


Furosemide (Furosemide 20 Mg Tab)  20 mg PO DAILY [facility]


Lactated Ringer's (Ringers, Lactated)  1,000 mls @ 150 mls/hr IV ASDIRECTED [facility]


   Last Admin: 11/29/21 02:18 Dose:  150 mls/hr


   Documented by: 


Levofloxacin/Dextrose 500 mg/ (Premix)  100 mls @ 100 mls/hr IV Q24H [facility]


   Last Admin: 11/29/21 02:32 Dose:  100 mls/hr


   Documented by: 


Insulin Human Regular (Insulin Regular, Human 100 Units/Ml 3 Ml Vial)  0 unit 

SUBCUT TIDPC [facility]; Protocol


   Last Admin: 11/28/21 19:51 Dose:  Not Given


   Documented by: 


Lorazepam (Lorazepam 0.5 Mg Tab)  0.5 mg PO QID [facility]


   Last Admin: 11/28/21 20:27 Dose:  0.5 mg


   Documented by: 


Losartan Potassium (Losartan 100 Mg Tab)  100 mg PO DAILY [facility]


Magnesium Hydroxide (Magnesium Hydroxide 400 Mg/5 Ml Susp 30 Ml Cup)  30 ml PO 

BEDTIME [facility]


   Last Admin: 11/28/21 20:25 Dose:  30 ml


   Documented by: 


Melatonin (Melatonin 3 Mg Tab)  9 mg PO BEDTIME [facility]


   Last Admin: 11/28/21 20:25 Dose:  9 mg


   Documented by: 


Mineral Oil/White Petrolatum (Mineral Oil/White Petrolatum Crm 113 Gm Jar)  0 gm

TOP BID [facility]


   Last Admin: 11/28/21 20:28 Dose:  1 applic


   Documented by: 


Mirtazapine (Mirtazapine 15 Mg Tab)  7.5 mg PO BEDTIME [facility]


   Last Admin: 11/28/21 20:26 Dose:  7.5 mg


   Documented by: 


Morphine Sulfate (Morphine 2 Mg/Ml Syringe)  2 mg IVPUSH Q2H PRN


   PRN Reason: Pain (severe 7-10)


   Stop: 11/29/21 12:28


Olanzapine (Olanzapine 5 Mg Tab)  10 mg PO DAILY [facility]


Ondansetron HCl (Ondansetron 4 Mg Tab.Dis)  4 mg PO Q4H PRN


   PRN Reason: nausea, able to take PO


Oxycodone HCl (Oxycodone 5 Mg Tab)  5 mg PO Q4H PRN


   PRN Reason: Pain (moderate 4-6)


Polyethylene Glycol (Polyethylene Glycol 3350 Powder 17 Gm Packet)  17 gm PO 

DAILY PRN


   PRN Reason: Constipation


Risperidone (Risperidone 1 Mg Tab)  3 mg PO BID [facility]


   Last Admin: 11/28/21 20:25 Dose:  3 mg


   Documented by: 


Tamsulosin HCl (Tamsulosin 0.4 Mg Cap.Er)  0.4 mg PO DAILY [facility]





Discontinued Medications





Albuterol/Ipratropium (Albuterol/Ipratropium 3.0-0.5 Mg/3 Ml Neb Soln)  3 ml INH

QID PRN


   PRN Reason: Cough


Lactated Ringer's (Ringers, Lactated)  1,000 mls @ 999 mls/hr IV .BOLUS ONE


   Stop: 11/28/21 02:59


   Last Admin: 11/28/21 02:05 Dose:  999 mls/hr


   Documented by: 


Sodium Chloride (Normal Saline)  100 mls @ 70 mls/min IV ASDIRECTED [facility]


   Last Admin: 11/28/21 02:41 Dose:  70 mls/min


   Documented by: 


Levofloxacin/Dextrose 750 mg/ (Premix)  150 mls @ 100 mls/hr IV ONETIME ONE


   Stop: 11/28/21 04:08


   Last Admin: 11/28/21 03:02 Dose:  100 mls/hr


   Documented by: 


Sodium Chloride (Normal Saline)  100 mls @ 70 mls/min IV ASDIRECTED [facility]


   Last Admin: 11/28/21 04:20 Dose:  70 mls/min


   Documented by: 


Lactated Ringer's (Ringers, Lactated)  1,000 mls @ 999 mls/hr IV .BOLUS ONE


   Stop: 11/28/21 06:35


   Last Admin: 11/28/21 05:41 Dose:  999 mls/hr


   Documented by: 


Levofloxacin/Dextrose 500 mg/ (Premix)  100 mls @ 100 mls/hr IV Q24H [facility]


   Last Admin: 11/28/21 17:11 Dose:  Not Given


   Documented by: 


Iopamidol (Iopamidol 755 Mg/Ml 100 Ml Bottle)  100 ml IVPUSH ONETIME ONE


   Stop: 11/28/21 02:05


   Last Admin: 11/28/21 02:41 Dose:  100 ml


   Documented by: 


Iopamidol (Iopamidol 755 Mg/Ml 100 Ml Bottle)  100 ml IVPUSH ONETIME ONE


   Stop: 11/28/21 03:42


   Last Admin: 11/28/21 04:20 Dose:  100 ml


   Documented by: 


Lorazepam (Lorazepam 0.5 Mg Tab)  0.5 mg PO ONETIME ONE


   Stop: 11/28/21 03:30


   Last Admin: 11/28/21 03:36 Dose:  0.5 mg


   Documented by: 


Lorazepam (Lorazepam 0.5 Mg Tab)  0.5 mg PO ONETIME ONE


   Stop: 11/28/21 05:38


   Last Admin: 11/28/21 05:41 Dose:  0.5 mg


   Documented by: 


Non-Formulary Medication (Menthol/Selenium Sulfide [Selsun Blue 1% Shampoo])  

118 ml .XX MOTH [facility]


Sodium Chloride (Sodium Chloride 0.9% 10 Ml Sdv)  10 ml FLUSH ONETIME ONE


   Stop: 11/28/21 02:05


   Last Admin: 11/28/21 02:41 Dose:  10 ml


   Documented by: 


Sodium Chloride (Sodium Chloride 0.9% 10 Ml Sdv)  10 ml FLUSH ONETIME ONE


   Stop: 11/28/21 03:42


   Last Admin: 11/28/21 04:20 Dose:  10 ml


   Documented by: 











- Exam


Quality Assessment: DVT Prophylaxis.  No: Supplemental Oxygen


General: Alert, Oriented, Lethargic


HEENT: Pupils Equal, Pupils Reactive


Neck: Supple, Trachea Midline


Lungs: Clear to Auscultation, Normal Respiratory Effort


Cardiovascular: Regular Rate, Regular Rhythm


GI/Abdominal Exam: Normal Bowel Sounds, No Distention


 (Male) Exam: Deferred


Back Exam: Normal Inspection, Full Range of Motion


Extremities: Normal Inspection, No Pedal Edema


Skin: Warm, Dry, Intact


Neurological: No New Focal Deficit


Psy/Mental Status: Alert





- Patient Data


Lab Results Last 24 hrs: 


                         Laboratory Results - last 24 hr











  11/28/21 11/28/21 11/28/21 Range/Units





  09:35 12:28 14:36 


 


WBC     (4.23-9.07)  K/mm3


 


RBC     (4.63-6.08)  M/mm3


 


Hgb     (13.7-17.5)  gm/dl


 


Hct     (40.1-51.0)  %


 


MCV     (79.0-92.2)  fl


 


MCH     (25.7-32.2)  pg


 


MCHC     (32.2-35.5)  g/dl


 


RDW Std Deviation     (35.1-43.9)  fL


 


Plt Count     (163-337)  K/mm3


 


MPV     (9.4-12.3)  fl


 


Neut % (Auto)     (34.0-67.9)  %


 


Lymph % (Auto)     (21.8-53.1)  %


 


Mono % (Auto)     (5.3-12.2)  %


 


Eos % (Auto)     (0.8-7.0)  


 


Baso % (Auto)     (0.1-1.2)  %


 


Neut # (Auto)     (1.78-5.38)  K/mm3


 


Lymph # (Auto)     (1.32-3.57)  K/mm3


 


Mono # (Auto)     (0.30-0.82)  K/mm3


 


Eos # (Auto)     (0.04-0.54)  K/mm3


 


Baso # (Auto)     (0.01-0.08)  K/mm3


 


D-Dimer, Quantitative     (0.19-0.50)  mg/L


 


Sodium     (136-145)  mEq/L


 


Potassium     (3.5-5.1)  mEq/L


 


Chloride     ()  mEq/L


 


Carbon Dioxide     (21-32)  mEq/L


 


Anion Gap     (5-15)  


 


BUN     (7-18)  mg/dL


 


Creatinine     (0.7-1.3)  mg/dL


 


Est Cr Clr Drug Dosing     mL/min


 


Estimated GFR (MDRD)     (>60)  mL/min


 


BUN/Creatinine Ratio     (14-18)  


 


Glucose     (70-99)  mg/dL


 


POC Glucose    154 H  (70-99)  mg/dL


 


Lactic Acid  2.9 H*  2.4 H*   (0.4-2.0)  mmol/L


 


Calcium     (8.5-10.1)  mg/dL


 


Magnesium     (1.8-2.4)  mg/dL


 


Total Bilirubin     (0.2-1.0)  mg/dL


 


AST     (15-37)  U/L


 


ALT     (16-63)  U/L


 


Alkaline Phosphatase     ()  U/L


 


Total Protein     (6.4-8.2)  g/dl


 


Albumin     (3.4-5.0)  g/dl


 


Globulin     gm/dL


 


Albumin/Globulin Ratio     (1-2)  














  11/28/21 11/28/21 11/29/21 Range/Units





  15:48 16:44 06:30 


 


WBC    8.56  (4.23-9.07)  K/mm3


 


RBC    3.19 L  (4.63-6.08)  M/mm3


 


Hgb    9.5 L D  (13.7-17.5)  gm/dl


 


Hct    31.0 L  (40.1-51.0)  %


 


MCV    97.2 H  (79.0-92.2)  fl


 


MCH    29.8  (25.7-32.2)  pg


 


MCHC    30.6 L  (32.2-35.5)  g/dl


 


RDW Std Deviation    49.8 H  (35.1-43.9)  fL


 


Plt Count    173  (163-337)  K/mm3


 


MPV    9.7  (9.4-12.3)  fl


 


Neut % (Auto)    74.9 H  (34.0-67.9)  %


 


Lymph % (Auto)    12.7 L  (21.8-53.1)  %


 


Mono % (Auto)    9.9  (5.3-12.2)  %


 


Eos % (Auto)    1.9  (0.8-7.0)  


 


Baso % (Auto)    0.2  (0.1-1.2)  %


 


Neut # (Auto)    6.41 H  (1.78-5.38)  K/mm3


 


Lymph # (Auto)    1.09 L  (1.32-3.57)  K/mm3


 


Mono # (Auto)    0.85 H  (0.30-0.82)  K/mm3


 


Eos # (Auto)    0.16  (0.04-0.54)  K/mm3


 


Baso # (Auto)    0.02  (0.01-0.08)  K/mm3


 


D-Dimer, Quantitative     (0.19-0.50)  mg/L


 


Sodium     (136-145)  mEq/L


 


Potassium     (3.5-5.1)  mEq/L


 


Chloride     ()  mEq/L


 


Carbon Dioxide     (21-32)  mEq/L


 


Anion Gap     (5-15)  


 


BUN     (7-18)  mg/dL


 


Creatinine     (0.7-1.3)  mg/dL


 


Est Cr Clr Drug Dosing     mL/min


 


Estimated GFR (MDRD)     (>60)  mL/min


 


BUN/Creatinine Ratio     (14-18)  


 


Glucose     (70-99)  mg/dL


 


POC Glucose   131 H   (70-99)  mg/dL


 


Lactic Acid  1.8    (0.4-2.0)  mmol/L


 


Calcium     (8.5-10.1)  mg/dL


 


Magnesium     (1.8-2.4)  mg/dL


 


Total Bilirubin     (0.2-1.0)  mg/dL


 


AST     (15-37)  U/L


 


ALT     (16-63)  U/L


 


Alkaline Phosphatase     ()  U/L


 


Total Protein     (6.4-8.2)  g/dl


 


Albumin     (3.4-5.0)  g/dl


 


Globulin     gm/dL


 


Albumin/Globulin Ratio     (1-2)  














  11/29/21 11/29/21 11/29/21 Range/Units





  06:30 06:30 07:01 


 


WBC     (4.23-9.07)  K/mm3


 


RBC     (4.63-6.08)  M/mm3


 


Hgb     (13.7-17.5)  gm/dl


 


Hct     (40.1-51.0)  %


 


MCV     (79.0-92.2)  fl


 


MCH     (25.7-32.2)  pg


 


MCHC     (32.2-35.5)  g/dl


 


RDW Std Deviation     (35.1-43.9)  fL


 


Plt Count     (163-337)  K/mm3


 


MPV     (9.4-12.3)  fl


 


Neut % (Auto)     (34.0-67.9)  %


 


Lymph % (Auto)     (21.8-53.1)  %


 


Mono % (Auto)     (5.3-12.2)  %


 


Eos % (Auto)     (0.8-7.0)  


 


Baso % (Auto)     (0.1-1.2)  %


 


Neut # (Auto)     (1.78-5.38)  K/mm3


 


Lymph # (Auto)     (1.32-3.57)  K/mm3


 


Mono # (Auto)     (0.30-0.82)  K/mm3


 


Eos # (Auto)     (0.04-0.54)  K/mm3


 


Baso # (Auto)     (0.01-0.08)  K/mm3


 


D-Dimer, Quantitative  0.59 H    (0.19-0.50)  mg/L


 


Sodium   141   (136-145)  mEq/L


 


Potassium   3.9   (3.5-5.1)  mEq/L


 


Chloride   106   ()  mEq/L


 


Carbon Dioxide   32   (21-32)  mEq/L


 


Anion Gap   6.9   (5-15)  


 


BUN   14   (7-18)  mg/dL


 


Creatinine   0.7   (0.7-1.3)  mg/dL


 


Est Cr Clr Drug Dosing   107.21   mL/min


 


Estimated GFR (MDRD)   > 60   (>60)  mL/min


 


BUN/Creatinine Ratio   20.0 H   (14-18)  


 


Glucose   126 H   (70-99)  mg/dL


 


POC Glucose    118 H  (70-99)  mg/dL


 


Lactic Acid     (0.4-2.0)  mmol/L


 


Calcium   8.5   (8.5-10.1)  mg/dL


 


Magnesium   2.1   (1.8-2.4)  mg/dL


 


Total Bilirubin   0.5   (0.2-1.0)  mg/dL


 


AST   12 L   (15-37)  U/L


 


ALT   17   (16-63)  U/L


 


Alkaline Phosphatase   61   ()  U/L


 


Total Protein   5.6 L   (6.4-8.2)  g/dl


 


Albumin   2.8 L   (3.4-5.0)  g/dl


 


Globulin   2.8   gm/dL


 


Albumin/Globulin Ratio   1.0   (1-2)  











Result Diagrams: 


                                 11/29/21 06:30





                                 11/29/21 06:30


Jamel Results Last 24 hrs: 


                                  Microbiology











 11/28/21 01:07 Blood Culture - Preliminary





 Blood - Venous - Lab Draw 


 


 11/28/21 00:56 Blood Culture - Preliminary





 Blood - Venous 














Sepsis Event Note





- Evaluation


Sepsis Screening Result: No Definite Risk





- Focused Exam


Vital Signs: 


                                   Vital Signs











  Temp Pulse Resp BP Pulse Ox


 


 11/29/21 05:40   81    95


 


 11/29/21 05:36  36.9 C  73  18  125/93 H  90 L


 


 11/29/21 00:49  36.5 C  71  18  107/60  93 L


 


 11/28/21 20:24  36.4 C  77  16  124/90  93 L














- Problem List & Annotations


(1) Acute respiratory failure


SNOMED Code(s): 33709124


   Code(s): J96.00 - ACUTE RESPIRATORY FAILURE, UNSP W HYPOXIA OR HYPERCAPNIA   

Status: Acute   Priority: High   Current Visit: Yes   


Qualifiers: 


   Respiratory failure complication: hypoxia   Qualified Code(s): J96.01 - Acute

respiratory failure with hypoxia   





(2) Pneumonia


SNOMED Code(s): 388116996


   Code(s): J18.9 - PNEUMONIA, UNSPECIFIED ORGANISM   Status: Acute   Priority: 

High   Current Visit: Yes   


Qualifiers: 


   Pneumonia type: due to unspecified organism   Laterality: unspecified 

laterality   Lung location: lower lobe of lung   Qualified Code(s): J18.9 - 

Pneumonia, unspecified organism   





(3) Delayed emotional development


SNOMED Code(s): 647227495


   Code(s): F88 - OTHER DISORDERS OF PSYCHOLOGICAL DEVELOPMENT   Status: Chronic

  Priority: Medium   Current Visit: Yes   





(4) Diabetes mellitus type 2 in obese


SNOMED Code(s): 16737505


   Code(s): E11.69 - TYPE 2 DIABETES MELLITUS WITH OTHER SPECIFIED COMPLICATION;

E66.9 - OBESITY, UNSPECIFIED   Status: Chronic   Priority: High   Current Visit:

Yes   





(5) Hypertension


SNOMED Code(s): 03408089


   Code(s): I10 - ESSENTIAL (PRIMARY) HYPERTENSION   Status: Chronic   Priority:

High   Current Visit: Yes   


Qualifiers: 


   Hypertension type: primary hypertension   Qualified Code(s): I10 - Essential 

(primary) hypertension   





(6) Total self-care deficit


SNOMED Code(s): 04912328


   Code(s): R41.89 - OTH SYMPTOMS AND SIGNS W COGNITIVE FUNCTIONS AND AWARENESS 

 Status: Chronic   Priority: High   Current Visit: Yes   





- Problem List Review


Problem List Initiated/Reviewed/Updated: Yes





- My Orders


Last 24 Hours: 


My Active Orders





11/28/21 12:24


polyethylene glycoL 3350 [MiraLAX]   17 gm PO DAILY PRN 





11/28/21 12:28


Cardiac Monitoring [RC] CONTINUOUS 


Oxygen Therapy [RC] PRN 


RT Aerosol Therapy [RC] ASDIRECTED 


Up ad Roslyn [RC] BID 


VTE/DVT Education [RC] DAILY 


Vital Signs [RC] Q4HR 


Albuterol/Ipratropium [DuoNeb 3.0-0.5 MG/3 ML]   3 ml NEB Q4H PRN 


Morphine   2 mg IVPUSH Q2H PRN 


Ondansetron [Zofran ODT]   4 mg PO Q4H PRN 


oxyCODONE   5 mg PO Q4H PRN 





11/28/21 12:54


Blood Glucose Check, Bedside [RC] TIDMEALS 





11/28/21 13:00


Insulin Regular, Human [HumuLIN R]   See Protocol  SUBCUT TIDPC 


LORazepam [Ativan]   0.5 mg PO QID 





11/28/21 16:20


Consult to Speech Language Pathology [SLP Evaluation and Treatment] [CONS] 

Routine 





11/28/21 16:27


Resuscitation Status Routine 





11/28/21 Dinner


Consistent Carbohydrate Diet [DIET] 


Mechanical Soft Diet [DIET] 





11/28/21 21:00


Magnesium Hydroxide [Milk of Magnesia]   30 ml PO BEDTIME 


Melatonin   9 mg PO BEDTIME 


Mineral Oil/Petrolatum,White [Hydrocerin Crm]   0 gm TOP BID 


Mirtazapine [Remeron]   7.5 mg PO BEDTIME 


fluvoxaMINE   150 mg PO BID 


risperiDONE [RisperiDAL]   3 mg PO BID 





11/29/21 03:00


Levofloxacin/Dextrose 5%-Water [Levaquin in D5W 500 MG/100 ML] 500 mg   Premix 

Bag 1 bag IV Q24H 





11/29/21 06:30


C-REACTIVE PROTEIN [CHEM] AM 


COMPREHENSIVE METABOLIC PN,CMP [CHEM] AM 


MAGNESIUM [CHEM] AM 





11/29/21 09:00


Enoxaparin [Lovenox]   40 mg SUBCUT DAILY 


Furosemide [Lasix]   20 mg PO DAILY 


Losartan [Cozaar]   100 mg PO DAILY 


OLANZapine [ZyPREXA]   10 mg PO DAILY 


Tamsulosin [Flomax]   0.4 mg PO DAILY 














- Plan


Plan:: 





The patient is a 61-year-old gentleman who is a resident of psychiatric facility

 at [facility].  Clearly because of the skilled needs 

the patient has been admitted as an inpatient.  He has been admitted through the

 emergency department with acute respiratory failure due to pneumonia.  He has 

been tested negative for COVID-19 infection.  The patient had been started on 

Levaquin.  The patient will also be kept on Levaquin 500 mg IV daily starting 

tomorrow.  The patient will be on oxygen to help keep his saturations around 90 

to 92%.  He is also diabetic and I have ordered a diabetic diet for him.  He 

will have insulin sliding scale.  The patient's blood sugar will be checked 3 

times a day and at bedtime.  He is also anticoagulated with the use of Lovenox 

40 mg subcutaneous daily for the DVT prophylaxis.  PT OT has been ordered for 

the patient.  Once the patient's oxygen demands have improved he will likely be 

appropriate for return to psychiatric facility.





11/29/2021





The patient is a 61-year-old gentleman who was admitted secondary to pneumonia. 

 Because the patient is a resident at a nursing home the patient was placed on 

Levaquin the patient has been tolerating this antibiotic well.  The patient's 

oxygen will be titrated to keep his saturations around 92%.  He is on a diabetic

 diet and this will continue.  The patient is also on insulin sliding scale.  He

 is also anticoagulated with the use of Lovenox.  The patient should be 

appropriate for discharge back to psychiatric facility once his oxygen demands 

have improved.  He is also anemic and repeat laboratory studies have been 

ordered and if the patient has his hemoglobin drop below 7.0 g/dL will consider 

transfusion.

## 2021-11-30 RX ADMIN — LEVOFLOXACIN SCH MLS/HR: 500 INJECTION, SOLUTION INTRAVENOUS at 02:46

## 2021-11-30 RX ADMIN — Medication SCH APPLIC: at 21:05

## 2021-11-30 RX ADMIN — SODIUM CHLORIDE SCH: 9 INJECTION, SOLUTION INTRAVENOUS at 19:38

## 2021-11-30 RX ADMIN — MAGNESIUM HYDROXIDE SCH ML: 400 SUSPENSION ORAL at 21:14

## 2021-11-30 RX ADMIN — SODIUM CHLORIDE SCH: 9 INJECTION, SOLUTION INTRAVENOUS at 09:47

## 2021-11-30 RX ADMIN — Medication SCH APPLIC: at 09:51

## 2021-11-30 RX ADMIN — SODIUM CHLORIDE SCH: 9 INJECTION, SOLUTION INTRAVENOUS at 12:42

## 2021-11-30 NOTE — PCM.PN
- General Info


Date of Service: 11/30/21


Admission Dx/Problem (Free Text): 


                           Admission Diagnosis/Problem





Admission Diagnosis/Problem      Pneumonia








Subjective Update: 





The patient is a 61-year-old gentleman who had presented from skilled nursing 

facility to the emergency department due to worsening cough and low oxygen 

levels.  The the patient has a significant psychiatric history.  The patient 

says that he is doing better with head nods.  The patient is eating well.  The 

patient replies primarily with shaking and nodding of head although he can speak

slowly.


Functional Status: Reports: Pain Controlled, Tolerating Diet.  Denies: New 

Symptoms





- Review of Systems


General: Reports: No Symptoms


HEENT: Reports: No Symptoms


Pulmonary: Reports: No Symptoms


Cardiovascular: Reports: No Symptoms


Gastrointestinal: Reports: No Symptoms


Genitourinary: Reports: No Symptoms


Musculoskeletal: Reports: No Symptoms


Skin: Reports: No Symptoms


Neurological: Reports: No Symptoms


Psychiatric: Reports: No Symptoms





- Patient Data


Vitals - Most Recent: 


                                Last Vital Signs











Temp  36.4 C   11/30/21 07:41


 


Pulse  58 L  11/30/21 07:41


 


Resp  19   11/30/21 07:41


 


BP  124/66   11/30/21 07:41


 


Pulse Ox  95   11/30/21 07:41











Weight - Most Recent: 81.556 kg


I&O - Last 24 Hours: 


                                 Intake & Output











 11/29/21 11/30/21 11/30/21





 22:59 06:59 14:59


 


Intake Total 2000 300 


 


Balance 2000 300 











Lab Results Last 24 Hours: 


                         Laboratory Results - last 24 hr











  11/29/21 11/29/21 11/30/21 Range/Units





  11:54 16:56 05:45 


 


WBC    5.18  (4.23-9.07)  K/mm3


 


RBC    3.29 L  (4.63-6.08)  M/mm3


 


Hgb    9.7 L  (13.7-17.5)  gm/dl


 


Hct    32.0 L  (40.1-51.0)  %


 


MCV    97.3 H  (79.0-92.2)  fl


 


MCH    29.5  (25.7-32.2)  pg


 


MCHC    30.3 L  (32.2-35.5)  g/dl


 


RDW Std Deviation    49.0 H  (35.1-43.9)  fL


 


Plt Count    166  (163-337)  K/mm3


 


MPV    9.7  (9.4-12.3)  fl


 


Neut % (Auto)    63.8  (34.0-67.9)  %


 


Lymph % (Auto)    23.4  (21.8-53.1)  %


 


Mono % (Auto)    9.7  (5.3-12.2)  %


 


Eos % (Auto)    2.5  (0.8-7.0)  


 


Baso % (Auto)    0.4  (0.1-1.2)  %


 


Neut # (Auto)    3.31  (1.78-5.38)  K/mm3


 


Lymph # (Auto)    1.21 L  (1.32-3.57)  K/mm3


 


Mono # (Auto)    0.50  (0.30-0.82)  K/mm3


 


Eos # (Auto)    0.13  (0.04-0.54)  K/mm3


 


Baso # (Auto)    0.02  (0.01-0.08)  K/mm3


 


Sodium     (136-145)  mEq/L


 


Potassium     (3.5-5.1)  mEq/L


 


Chloride     ()  mEq/L


 


Carbon Dioxide     (21-32)  mEq/L


 


Anion Gap     (5-15)  


 


BUN     (7-18)  mg/dL


 


Creatinine     (0.7-1.3)  mg/dL


 


Est Cr Clr Drug Dosing     mL/min


 


Estimated GFR (MDRD)     (>60)  mL/min


 


BUN/Creatinine Ratio     (14-18)  


 


Glucose     (70-99)  mg/dL


 


POC Glucose  126 H  165 H   (70-99)  mg/dL


 


Calcium     (8.5-10.1)  mg/dL


 


Total Bilirubin     (0.2-1.0)  mg/dL


 


AST     (15-37)  U/L


 


ALT     (16-63)  U/L


 


Alkaline Phosphatase     ()  U/L


 


C-Reactive Protein     (<1.0)  mg/dL


 


Total Protein     (6.4-8.2)  g/dl


 


Albumin     (3.4-5.0)  g/dl


 


Globulin     gm/dL


 


Albumin/Globulin Ratio     (1-2)  














  11/30/21 11/30/21 11/30/21 Range/Units





  05:45 05:45 06:21 


 


WBC     (4.23-9.07)  K/mm3


 


RBC     (4.63-6.08)  M/mm3


 


Hgb     (13.7-17.5)  gm/dl


 


Hct     (40.1-51.0)  %


 


MCV     (79.0-92.2)  fl


 


MCH     (25.7-32.2)  pg


 


MCHC     (32.2-35.5)  g/dl


 


RDW Std Deviation     (35.1-43.9)  fL


 


Plt Count     (163-337)  K/mm3


 


MPV     (9.4-12.3)  fl


 


Neut % (Auto)     (34.0-67.9)  %


 


Lymph % (Auto)     (21.8-53.1)  %


 


Mono % (Auto)     (5.3-12.2)  %


 


Eos % (Auto)     (0.8-7.0)  


 


Baso % (Auto)     (0.1-1.2)  %


 


Neut # (Auto)     (1.78-5.38)  K/mm3


 


Lymph # (Auto)     (1.32-3.57)  K/mm3


 


Mono # (Auto)     (0.30-0.82)  K/mm3


 


Eos # (Auto)     (0.04-0.54)  K/mm3


 


Baso # (Auto)     (0.01-0.08)  K/mm3


 


Sodium  144    (136-145)  mEq/L


 


Potassium  3.8    (3.5-5.1)  mEq/L


 


Chloride  107    ()  mEq/L


 


Carbon Dioxide  29    (21-32)  mEq/L


 


Anion Gap  11.8    (5-15)  


 


BUN  15    (7-18)  mg/dL


 


Creatinine  0.6 L    (0.7-1.3)  mg/dL


 


Est Cr Clr Drug Dosing  133.50    mL/min


 


Estimated GFR (MDRD)  > 60    (>60)  mL/min


 


BUN/Creatinine Ratio  25.0 H    (14-18)  


 


Glucose  117 H    (70-99)  mg/dL


 


POC Glucose    123 H  (70-99)  mg/dL


 


Calcium  8.3 L    (8.5-10.1)  mg/dL


 


Total Bilirubin  0.3    (0.2-1.0)  mg/dL


 


AST  10 L    (15-37)  U/L


 


ALT  18    (16-63)  U/L


 


Alkaline Phosphatase  55    ()  U/L


 


C-Reactive Protein   11.7 H*   (<1.0)  mg/dL


 


Total Protein  5.8 L    (6.4-8.2)  g/dl


 


Albumin  2.8 L    (3.4-5.0)  g/dl


 


Globulin  3.0    gm/dL


 


Albumin/Globulin Ratio  0.9 L    (1-2)  











Jamel Results Last 24 Hours: 


                                  Microbiology











 11/28/21 01:07 Blood Culture - Preliminary





 Blood - Venous - Lab Draw 


 


 11/28/21 00:56 Blood Culture - Preliminary





 Blood - Venous 











Med Orders - Current: 


                               Current Medications





Albuterol/Ipratropium (Albuterol/Ipratropium 3.0-0.5 Mg/3 Ml Neb Soln)  3 ml NEB

Q4H PRN


   PRN Reason: Shortness Of Breath/wheezing


Enoxaparin Sodium (Enoxaparin 40 Mg/0.4 Ml Syringe)  40 mg SUBCUT DAILY [facility]


   Last Admin: 11/29/21 09:17 Dose:  40 mg


   Documented by: 


Fluvoxamine Maleate (Fluvoxamine 50 Mg Tab)  150 mg PO BID [facility]


   Last Admin: 11/29/21 20:21 Dose:  150 mg


   Documented by: 


Furosemide (Furosemide 20 Mg Tab)  20 mg PO DAILY [facility]


   Last Admin: 11/29/21 09:16 Dose:  20 mg


   Documented by: 


Levofloxacin/Dextrose 500 mg/ (Premix)  100 mls @ 100 mls/hr IV Q24H [facility]


   Last Admin: 11/30/21 02:46 Dose:  100 mls/hr


   Documented by: 


Insulin Human Regular (Insulin Regular, Human 100 Units/Ml 3 Ml Vial)  0 unit 

SUBCUT TIDPC [facility]; Protocol


   Last Admin: 11/29/21 19:17 Dose:  2 unit


   Documented by: 


Lorazepam (Lorazepam 0.5 Mg Tab)  0.5 mg PO QID [facility]


   Last Admin: 11/29/21 20:22 Dose:  0.5 mg


   Documented by: 


Losartan Potassium (Losartan 100 Mg Tab)  100 mg PO DAILY [facility]


   Last Admin: 11/29/21 09:16 Dose:  100 mg


   Documented by: 


Magnesium Hydroxide (Magnesium Hydroxide 400 Mg/5 Ml Susp 30 Ml Cup)  30 ml PO 

BEDTIME [facility]


   Last Admin: 11/29/21 20:59 Dose:  Not Given


   Documented by: 


Melatonin (Melatonin 3 Mg Tab)  9 mg PO BEDTIME [facility]


   Last Admin: 11/29/21 20:20 Dose:  9 mg


   Documented by: 


Mineral Oil/White Petrolatum (Mineral Oil/White Petrolatum Crm 113 Gm Jar)  0 gm

TOP BID [facility]


   Last Admin: 11/29/21 20:32 Dose:  1 applic


   Documented by: 


Mirtazapine (Mirtazapine 15 Mg Tab)  7.5 mg PO BEDTIME [facility]


   Last Admin: 11/29/21 20:18 Dose:  7.5 mg


   Documented by: 


Olanzapine (Olanzapine 5 Mg Tab)  10 mg PO DAILY [facility]


   Last Admin: 11/29/21 09:16 Dose:  10 mg


   Documented by: 


Ondansetron HCl (Ondansetron 4 Mg Tab.Dis)  4 mg PO Q4H PRN


   PRN Reason: nausea, able to take PO


Oxycodone HCl (Oxycodone 5 Mg Tab)  5 mg PO Q4H PRN


   PRN Reason: Pain (moderate 4-6)


Polyethylene Glycol (Polyethylene Glycol 3350 Powder 17 Gm Packet)  17 gm PO 

DAILY PRN


   PRN Reason: Constipation


Risperidone (Risperidone 1 Mg Tab)  3 mg PO BID [facility]


   Last Admin: 11/29/21 20:20 Dose:  3 mg


   Documented by: 


Tamsulosin HCl (Tamsulosin 0.4 Mg Cap.Er)  0.4 mg PO DAILY [facility]


   Last Admin: 11/29/21 09:16 Dose:  0.4 mg


   Documented by: 





Discontinued Medications





Albuterol/Ipratropium (Albuterol/Ipratropium 3.0-0.5 Mg/3 Ml Neb Soln)  3 ml INH

QID PRN


   PRN Reason: Cough


Lactated Ringer's (Ringers, Lactated)  1,000 mls @ 999 mls/hr IV .BOLUS ONE


   Stop: 11/28/21 02:59


   Last Admin: 11/28/21 02:05 Dose:  999 mls/hr


   Documented by: 


Lactated Ringer's (Ringers, Lactated)  [facility],000 [facility]


   Last Admin: 11/29/21 16:28 Dose:  150 mls/hr


   Documented by: 


Sodium Chloride (Normal Saline)  100 mls @ 70 mls/min IV ASDIRECTED [facility]


   Last Admin: 11/28/21 02:41 Dose:  70 mls/min


   Documented by: 


Levofloxacin/Dextrose 750 mg/ (Premix)  150 mls @ 100 mls/hr IV ONETIME ONE


   Stop: 11/28/21 04:08


   Last Admin: 11/28/21 03:02 Dose:  100 mls/hr


   Documented by: 


Sodium Chloride (Normal Saline)  100 mls @ 70 mls/min IV ASDIRECTED [facility]


   Last Admin: 11/28/21 04:20 Dose:  70 mls/min


   Documented by: 


Lactated Ringer's (Ringers, Lactated)  1,000 mls @ 999 mls/hr IV .BOLUS ONE


   Stop: 11/28/21 06:35


   Last Admin: 11/28/21 05:41 Dose:  999 mls/hr


   Documented by: 


Levofloxacin/Dextrose 500 mg/ (Premix)  100 mls @ 100 mls/hr IV Q24H [facility]


   Last Admin: 11/28/21 17:11 Dose:  Not Given


   Documented by: 


Iopamidol (Iopamidol 755 Mg/Ml 100 Ml Bottle)  100 ml IVPUSH ONETIME ONE


   Stop: 11/28/21 02:05


   Last Admin: 11/28/21 02:41 Dose:  100 ml


   Documented by: 


Iopamidol (Iopamidol 755 Mg/Ml 100 Ml Bottle)  100 ml IVPUSH ONETIME ONE


   Stop: 11/28/21 03:42


   Last Admin: 11/28/21 04:20 Dose:  100 ml


   Documented by: 


Lorazepam (Lorazepam 0.5 Mg Tab)  0.5 mg PO ONETIME ONE


   Stop: 11/28/21 03:30


   Last Admin: 11/28/21 03:36 Dose:  0.5 mg


   Documented by: 


Lorazepam (Lorazepam 0.5 Mg Tab)  0.5 mg PO ONETIME ONE


   Stop: 11/28/21 05:38


   Last Admin: 11/28/21 05:41 Dose:  0.5 mg


   Documented by: 


Morphine Sulfate (Morphine 2 Mg/Ml Syringe)  2 mg IVPUSH Q2H PRN


   PRN Reason: Pain (severe 7-10)


   Stop: 11/29/21 12:28


Non-Formulary Medication (Menthol/Selenium Sulfide [Selsun Blue 1% Shampoo])  

118 ml .XX MOTH LEXY


Sodium Chloride (Sodium Chloride 0.9% 10 Ml Sdv)  10 ml FLUSH ONETIME ONE


   Stop: 11/28/21 02:05


   Last Admin: 11/28/21 02:41 Dose:  10 ml


   Documented by: 


Sodium Chloride (Sodium Chloride 0.9% 10 Ml Sdv)  10 ml FLUSH ONETIME ONE


   Stop: 11/28/21 03:42


   Last Admin: 11/28/21 04:20 Dose:  10 ml


   Documented by: 











- Exam


Quality Assessment: DVT Prophylaxis.  No: Supplemental Oxygen


General: Alert, Oriented, Cooperative, No Acute Distress


HEENT: Pupils Equal, Pupils Reactive, EOMI, Mucous Membr. Moist/Pink


Neck: Supple, Trachea Midline


Lungs: Clear to Auscultation, Normal Respiratory Effort


Cardiovascular: Regular Rate, Regular Rhythm


GI/Abdominal Exam: Normal Bowel Sounds, Soft, Non-Tender, No Distention


 (Male) Exam: Deferred


Back Exam: Normal Inspection (Slow to move), Full Range of Motion (Laying flat.)


Extremities: Normal Inspection, No Pedal Edema


Skin: Warm, Dry, Intact


Neurological: No New Focal Deficit


Psy/Mental Status: Alert, Normal Affect, Normal Mood





- Patient Data


Lab Results Last 24 hrs: 


                         Laboratory Results - last 24 hr











  11/29/21 11/29/21 11/30/21 Range/Units





  11:54 16:56 05:45 


 


WBC    5.18  (4.23-9.07)  K/mm3


 


RBC    3.29 L  (4.63-6.08)  M/mm3


 


Hgb    9.7 L  (13.7-17.5)  gm/dl


 


Hct    32.0 L  (40.1-51.0)  %


 


MCV    97.3 H  (79.0-92.2)  fl


 


MCH    29.5  (25.7-32.2)  pg


 


MCHC    30.3 L  (32.2-35.5)  g/dl


 


RDW Std Deviation    49.0 H  (35.1-43.9)  fL


 


Plt Count    166  (163-337)  K/mm3


 


MPV    9.7  (9.4-12.3)  fl


 


Neut % (Auto)    63.8  (34.0-67.9)  %


 


Lymph % (Auto)    23.4  (21.8-53.1)  %


 


Mono % (Auto)    9.7  (5.3-12.2)  %


 


Eos % (Auto)    2.5  (0.8-7.0)  


 


Baso % (Auto)    0.4  (0.1-1.2)  %


 


Neut # (Auto)    3.31  (1.78-5.38)  K/mm3


 


Lymph # (Auto)    1.21 L  (1.32-3.57)  K/mm3


 


Mono # (Auto)    0.50  (0.30-0.82)  K/mm3


 


Eos # (Auto)    0.13  (0.04-0.54)  K/mm3


 


Baso # (Auto)    0.02  (0.01-0.08)  K/mm3


 


Sodium     (136-145)  mEq/L


 


Potassium     (3.5-5.1)  mEq/L


 


Chloride     ()  mEq/L


 


Carbon Dioxide     (21-32)  mEq/L


 


Anion Gap     (5-15)  


 


BUN     (7-18)  mg/dL


 


Creatinine     (0.7-1.3)  mg/dL


 


Est Cr Clr Drug Dosing     mL/min


 


Estimated GFR (MDRD)     (>60)  mL/min


 


BUN/Creatinine Ratio     (14-18)  


 


Glucose     (70-99)  mg/dL


 


POC Glucose  126 H  165 H   (70-99)  mg/dL


 


Calcium     (8.5-10.1)  mg/dL


 


Total Bilirubin     (0.2-1.0)  mg/dL


 


AST     (15-37)  U/L


 


ALT     (16-63)  U/L


 


Alkaline Phosphatase     ()  U/L


 


C-Reactive Protein     (<1.0)  mg/dL


 


Total Protein     (6.4-8.2)  g/dl


 


Albumin     (3.4-5.0)  g/dl


 


Globulin     gm/dL


 


Albumin/Globulin Ratio     (1-2)  














  11/30/21 11/30/21 11/30/21 Range/Units





  05:45 05:45 06:21 


 


WBC     (4.23-9.07)  K/mm3


 


RBC     (4.63-6.08)  M/mm3


 


Hgb     (13.7-17.5)  gm/dl


 


Hct     (40.1-51.0)  %


 


MCV     (79.0-92.2)  fl


 


MCH     (25.7-32.2)  pg


 


MCHC     (32.2-35.5)  g/dl


 


RDW Std Deviation     (35.1-43.9)  fL


 


Plt Count     (163-337)  K/mm3


 


MPV     (9.4-12.3)  fl


 


Neut % (Auto)     (34.0-67.9)  %


 


Lymph % (Auto)     (21.8-53.1)  %


 


Mono % (Auto)     (5.3-12.2)  %


 


Eos % (Auto)     (0.8-7.0)  


 


Baso % (Auto)     (0.1-1.2)  %


 


Neut # (Auto)     (1.78-5.38)  K/mm3


 


Lymph # (Auto)     (1.32-3.57)  K/mm3


 


Mono # (Auto)     (0.30-0.82)  K/mm3


 


Eos # (Auto)     (0.04-0.54)  K/mm3


 


Baso # (Auto)     (0.01-0.08)  K/mm3


 


Sodium  144    (136-145)  mEq/L


 


Potassium  3.8    (3.5-5.1)  mEq/L


 


Chloride  107    ()  mEq/L


 


Carbon Dioxide  29    (21-32)  mEq/L


 


Anion Gap  11.8    (5-15)  


 


BUN  15    (7-18)  mg/dL


 


Creatinine  0.6 L    (0.7-1.3)  mg/dL


 


Est Cr Clr Drug Dosing  133.50    mL/min


 


Estimated GFR (MDRD)  > 60    (>60)  mL/min


 


BUN/Creatinine Ratio  25.0 H    (14-18)  


 


Glucose  117 H    (70-99)  mg/dL


 


POC Glucose    123 H  (70-99)  mg/dL


 


Calcium  8.3 L    (8.5-10.1)  mg/dL


 


Total Bilirubin  0.3    (0.2-1.0)  mg/dL


 


AST  10 L    (15-37)  U/L


 


ALT  18    (16-63)  U/L


 


Alkaline Phosphatase  55    ()  U/L


 


C-Reactive Protein   11.7 H*   (<1.0)  mg/dL


 


Total Protein  5.8 L    (6.4-8.2)  g/dl


 


Albumin  2.8 L    (3.4-5.0)  g/dl


 


Globulin  3.0    gm/dL


 


Albumin/Globulin Ratio  0.9 L    (1-2)  











Result Diagrams: 


                                 11/30/21 05:45





                                 11/30/21 05:45


Jamel Results Last 24 hrs: 


                                  Microbiology











 11/28/21 01:07 Blood Culture - Preliminary





 Blood - Venous - Lab Draw 


 


 11/28/21 00:56 Blood Culture - Preliminary





 Blood - Venous 














Sepsis Event Note





- Evaluation


Sepsis Screening Result: No Definite Risk





- Focused Exam


Vital Signs: 


                                   Vital Signs











  Temp Pulse Resp BP Pulse Ox


 


 11/30/21 07:41  36.4 C  58 L  19  124/66  95


 


 11/30/21 02:58  36.4 C  59 L  16  111/66  92 L


 


 11/29/21 23:32  36.6 C  58 L  16  108/57 L  95














- Problem List & Annotations


(1) Acute respiratory failure


SNOMED Code(s): 14925167


   Code(s): J96.00 - ACUTE RESPIRATORY FAILURE, UNSP W HYPOXIA OR HYPERCAPNIA   

Status: Resolved   Priority: High   Current Visit: Yes   


Qualifiers: 


   Respiratory failure complication: hypoxia   Qualified Code(s): J96.01 - Acute

respiratory failure with hypoxia   





(2) Pneumonia


SNOMED Code(s): 262891742


   Code(s): J18.9 - PNEUMONIA, UNSPECIFIED ORGANISM   Status: Acute   Priority: 

High   Current Visit: Yes   


Qualifiers: 


   Pneumonia type: due to unspecified organism   Laterality: unspecified 

laterality   Lung location: lower lobe of lung   Qualified Code(s): J18.9 - 

Pneumonia, unspecified organism   





(3) Delayed emotional development


SNOMED Code(s): 220425174


   Code(s): F88 - OTHER DISORDERS OF PSYCHOLOGICAL DEVELOPMENT   Status: Chronic

  Priority: Medium   Current Visit: Yes   





(4) Diabetes mellitus type 2 in obese


SNOMED Code(s): 54004989


   Code(s): E11.69 - TYPE 2 DIABETES MELLITUS WITH OTHER SPECIFIED COMPLICATION;

E66.9 - OBESITY, UNSPECIFIED   Status: Chronic   Priority: High   Current Visit:

Yes   





(5) Hypertension


SNOMED Code(s): 40983752


   Code(s): I10 - ESSENTIAL (PRIMARY) HYPERTENSION   Status: Chronic   Priority:

High   Current Visit: Yes   


Qualifiers: 


   Hypertension type: primary hypertension   Qualified Code(s): I10 - Essential 

(primary) hypertension   





(6) Total self-care deficit


SNOMED Code(s): 46077220


   Code(s): R41.89 - OTH SYMPTOMS AND SIGNS W COGNITIVE FUNCTIONS AND AWARENESS 

 Status: Chronic   Priority: High   Current Visit: Yes   





- Problem List Review


Problem List Initiated/Reviewed/Updated: Yes





- My Orders


Last 24 Hours: 


My Active Orders





11/29/21 09:00


Enoxaparin [Lovenox]   40 mg SUBCUT DAILY 


Furosemide [Lasix]   20 mg PO DAILY 


Losartan [Cozaar]   100 mg PO DAILY 


OLANZapine [ZyPREXA]   10 mg PO DAILY 


Tamsulosin [Flomax]   0.4 mg PO DAILY 





11/29/21 10:50


PT Evaluation and Treatment [CONS] Routine 





11/29/21 10:51


OT Evaluation and Treatment [CONS] Routine 





11/29/21 Dinner


Consistent Carbohydrate Diet [DIET] 














- Plan


Plan:: 





The patient is a 61-year-old gentleman who is a resident of psychiatric facility

 at [facility].  Clearly because of the skilled needs 

the patient has been admitted as an inpatient.  He has been admitted through the

 emergency department with acute respiratory failure due to pneumonia.  He has 

been tested negative for COVID-19 infection.  The patient had been started on 

Levaquin.  The patient will also be kept on Levaquin 500 mg IV daily starting 

tomorrow.  The patient will be on oxygen to help keep his saturations around 90 

to 92%.  He is also diabetic and I have ordered a diabetic diet for him.  He 

will have insulin sliding scale.  The patient's blood sugar will be checked 3 

times a day and at bedtime.  He is also anticoagulated with the use of Lovenox 

40 mg subcutaneous daily for the DVT prophylaxis.  PT OT has been ordered for 

the patient.  Once the patient's oxygen demands have improved he will likely be 

appropriate for return to psychiatric facility.





11/29/2021





The patient is a 61-year-old gentleman who was admitted secondary to pneumonia. 

 Because the patient is a resident at a nursing home the patient was placed on 

Levaquin the patient has been tolerating this antibiotic well.  The patient's 

oxygen will be titrated to keep his saturations around 92%.  He is on a diabetic

 diet and this will continue.  The patient is also on insulin sliding scale.  He

 is also anticoagulated with the use of Lovenox.  The patient should be 

appropriate for discharge back to psychiatric facility once his oxygen demands 

have improved.  He is also anemic and repeat laboratory studies have been 

ordered and if the patient has his hemoglobin drop below 7.0 g/dL will consider 

transfusion.





11/30/2021





The patient is a 61-year-old gentleman who had been admitted secondary to 

pneumonia from a skilled facility.  The patient has been doing well.  He is 

heavily medicated and does have a psychiatric history.  The patient's oxygen 

will can be kept around 92%.  The patient is also anticoagulated with the use of

 Lovenox.  Should be appropriate for discharge back to nursing facility likely 

tomorrow.  Patient is also anemic and we will continue to monitor this with 

repeat laboratory studies and if the patient's hemoglobin drops below 7.0 g/dL 

will consider transfusion.  The patient is also to continue with his current 

diet as tolerated.  The patient's hemoglobin has remained stable throughout 

hospitalization.

## 2021-12-01 VITALS — DIASTOLIC BLOOD PRESSURE: 41 MMHG | SYSTOLIC BLOOD PRESSURE: 114 MMHG | HEART RATE: 76 BPM

## 2021-12-01 RX ADMIN — SODIUM CHLORIDE SCH UNIT: 9 INJECTION, SOLUTION INTRAVENOUS at 12:49

## 2021-12-01 RX ADMIN — Medication SCH APPLIC: at 09:36

## 2021-12-01 RX ADMIN — SODIUM CHLORIDE SCH: 9 INJECTION, SOLUTION INTRAVENOUS at 09:36

## 2021-12-01 RX ADMIN — LEVOFLOXACIN SCH MLS/HR: 500 INJECTION, SOLUTION INTRAVENOUS at 03:05

## 2021-12-01 NOTE — [id number]
**Discharge Summary





- Hospital Course


HPI Initial Comments: 





The patient is a 61-year-old gentleman from a skilled nursing facility that was 

brought into the emergency department after he was found to have worsening cough

and low oxygen levels.  The patient has a significant psychiatric history and 

has been in a psychiatric ayala at [facility].  The patient is 

heavily medicated and somewhat confused.  He is a poor historian.  Most of the 

information is been taken from his previous charting as well as prior records.  

It was reported that the patient's oxygen levels were low around 80%.  Patient 

himself says that he has been having trouble breathing and feels short of 

breath.  The patient's COVID-19 testing was negative.





Assessment/Plan Comment:: 





The patient is a 61-year-old gentleman who is a resident of psychiatric facility

at [facility].  Clearly because of the skilled needs 

the patient has been admitted as an inpatient.  He has been admitted through the

emergency department with acute respiratory failure due to pneumonia.  He has 

been tested negative for COVID-19 infection.  The patient had been started on 

Levaquin.  The patient will also be kept on Levaquin 500 mg IV daily starting 

tomorrow.  The patient will be on oxygen to help keep his saturations around 90 

to 92%.  He is also diabetic and I have ordered a diabetic diet for him.  He 

will have insulin sliding scale.  The patient's blood sugar will be checked 3 

times a day and at bedtime.  He is also anticoagulated with the use of Lovenox 

40 mg subcutaneous daily for the DVT prophylaxis.  PT OT has been ordered for 

the patient.  Once the patient's oxygen demands have improved he will likely be 

appropriate for return to psychiatric facility.





- Mortality Measure


Prognosis:: Poor








Addendum: The patient had 2 separate CT exams of his chest with contrast and 

therefore to help prevent contrast-induced nephropathy the patient's Metformin 

has been held and he will be kept on IV fluids.


Diagnosis: Stroke: No





- Discharge Data


Discharge Date: 12/01/21


Discharge Disposition: DC/Tfer to Long Term Care [facility]


Condition: Good





- Referral to Home Health


Primary Care Physician: 


Regis Barajas MD








- Patient Summary/Data


Consults: 


                                  Consultations





11/28/21 16:20


Consult to Speech Language Pathology [SLP Evaluation and Treatment] [CONS] 

Routine 





11/29/21 10:50


PT Evaluation and Treatment [CONS] Routine 





11/29/21 10:51


OT Evaluation and Treatment [CONS] Routine 











Hospital Course: 





11/29/2021





The patient is a 61-year-old gentleman who was admitted secondary to pneumonia. 

 Because the patient is a resident at a nursing home the patient was placed on 

Levaquin the patient has been tolerating this antibiotic well.  The patient's 

oxygen will be titrated to keep his saturations around 92%.  He is on a diabetic

 diet and this will continue.  The patient is also on insulin sliding scale.  He

 is also anticoagulated with the use of Lovenox.  The patient should be 

appropriate for discharge back to psychiatric facility once his oxygen demands 

have improved.  He is also anemic and repeat laboratory studies have been 

ordered and if the patient has his hemoglobin drop below 7.0 g/dL will consider 

transfusion.





11/30/2021





The patient is a 61-year-old gentleman who had been admitted secondary to 

pneumonia from a skilled facility.  The patient has been doing well.  He is 

heavily medicated and does have a psychiatric history.  The patient's oxygen 

will can be kept around 92%.  The patient is also anticoagulated with the use of

 Lovenox.  Should be appropriate for discharge back to nursing facility likely 

tomorrow.  Patient is also anemic and we will continue to monitor this with 

repeat laboratory studies and if the patient's hemoglobin drops below 7.0 g/dL 

will consider transfusion.  The patient is also to continue with his current 

diet as tolerated.  The patient's hemoglobin has remained stable throughout 

hospitalization.





12/1/2021day of discharge





61-year-old male with Alzheimer's disease, delayed emotional development, 

delusions, wandering, hoarding disorder, mild intellectual disabilities and 

admitted for pneumonia.  Patient has been on Levaquin with good results.  White 

count is normal today at 4.6 and CRP is down to 5.5.  He is on room air and 

ready for discharge back to [facility].





- Patient Instructions


Diet: Usual Diet as Tolerated


Driving: Do Not Drive


Showering/Bathing: May Shower


Other/Special Instructions: Follow up with PCP next week.





- Discharge Plan


*PRESCRIPTION DRUG MONITORING PROGRAM REVIEWED*: No


*COPY OF PRESCRIPTION DRUG MONITORING REPORT IN PATIENT TIFFANY: No


Prescriptions/Med Rec: 


levoFLOXacin [Levaquin] 500 mg PO Q24H #2 tablet


Home Medications: 


                                    Home Meds





Losartan [Cozaar] 100 mg PO DAILY 06/15/19 [History]


atorvaSTATin [Lipitor] 20 mg PO BEDTIME 06/17/19 [History]


Calcium Carbonate [Tums] 500 mg PO BID 10/07/20 [History]


Cholecalciferol (Vitamin D3) [Vitamin D3] 1,000 unit PO DAILY 10/07/20 [History]


Furosemide [Lasix] 20 mg PO DAILY 10/07/20 [History]


LORazepam [Ativan] 0.5 mg PO QID 10/07/20 [History]


Melatonin 9 mg PO BEDTIME 10/07/20 [History]


Mirtazapine [Remeron] 7.5 mg PO BEDTIME 10/07/20 [History]


Multivit-Min/FA/Lycopen/Lutein [Certavite Senior Tablet] 1 tab PO DAILY 10/07/20

[History]


bisacodyL [Dulcolax] 5 mg PO BID PRN 10/07/20 [History]


polyethylene glycoL 3350 [MiraLAX] 17 gm PO DAILY PRN 10/07/20 [History]


risperiDONE [Risperdal] 3 mg PO BID 10/07/20 [History]


Ipratropium/Albuterol Sulfate [Iprat-Albut 0.5-3(2.5) MG/3 ML] 1 inhalation INH 

QID PRN 12/07/20 [History]


Magnesium Hydroxide [Milk of Magnesia] 30 ml PO BEDTIME 01/05/21 [History]


metFORMIN [Glucophage XR] 1,000 mg PO BIDMEALS 01/05/21 [History]


[place]/Min Oil/Kiana/Wool Alcoh [Eucerin Creme] 1 dose TOP BID 11/28/21 [History]


OLANZapine [ZyPREXA] 10 mg PO DAILY 11/28/21 [History]


Tamsulosin [Flomax] 0.4 mg PO DAILY 11/28/21 [History]


fluvoxaMINE [Luvox] 150 mg PO BID 11/28/21 [History]


levoFLOXacin [Levaquin] 500 mg PO Q24H #2 tablet 12/01/21 [Rx]








Oxygen Therapy Mode: Room Air


Patient Handouts:  Sepsis, Diagnosis, Adult, Community-Acquired Pneumonia, Adult


Forms:  ED Department Discharge


Referrals: 


RatRegis murrieta MD [Primary Care Provider] -  (Nurse will call Nursing 

home to set up appt. arian Lopez at [facility].)





- Discharge Summary/Plan Comment


DC Time >30 min.: Yes


Total # of Minutes for Discharge Time: 





40


Total time spent includes seeing the patient, doing discharge paperwork, and 

arranging care.





- General Info


Date of Service: 12/01/21


Admission Dx/Problem (Free Text: 


                           Admission Diagnosis/Problem





Admission Diagnosis/Problem      Pneumonia








Subjective Update: 





Patient denied any pain.





- Review of Systems


General: Reports: Other (Patient is a poor historian only shaking his head when 

asked if he had pain.)





- Patient Data


Vitals - Most Recent: 


                                Last Vital Signs











Temp  97.9 F   12/01/21 07:14


 


Pulse  65   12/01/21 07:14


 


Resp  19   12/01/21 07:14


 


BP  106/67   12/01/21 09:36


 


Pulse Ox  92 L  12/01/21 07:14











Weight - Most Recent: 178 lb 3.2 oz


I&O - Last 24 hours: 


                                 Intake & Output











 11/30/21 12/01/21 12/01/21





 22:59 06:59 14:59


 


Intake Total 960 500 210


 


Balance 960 500 210











Lab Results - Last 24 hrs: 


                         Laboratory Results - last 24 hr











  11/30/21 12/01/21 12/01/21 Range/Units





  16:44 06:43 07:20 


 


WBC    4.62  (4.23-9.07)  K/mm3


 


RBC    3.45 L  (4.63-6.08)  M/mm3


 


Hgb    10.3 L  (13.7-17.5)  gm/dl


 


Hct    33.3 L  (40.1-51.0)  %


 


MCV    96.5 H  (79.0-92.2)  fl


 


MCH    29.9  (25.7-32.2)  pg


 


MCHC    30.9 L  (32.2-35.5)  g/dl


 


RDW Std Deviation    48.4 H  (35.1-43.9)  fL


 


Plt Count    185  (163-337)  K/mm3


 


MPV    9.5  (9.4-12.3)  fl


 


Neut % (Auto)    63.5  (34.0-67.9)  %


 


Lymph % (Auto)    25.5  (21.8-53.1)  %


 


Mono % (Auto)    8.2  (5.3-12.2)  %


 


Eos % (Auto)    2.4  (0.8-7.0)  


 


Baso % (Auto)    0.2  (0.1-1.2)  %


 


Neut # (Auto)    2.93  (1.78-5.38)  K/mm3


 


Lymph # (Auto)    1.18 L  (1.32-3.57)  K/mm3


 


Mono # (Auto)    0.38  (0.30-0.82)  K/mm3


 


Eos # (Auto)    0.11  (0.04-0.54)  K/mm3


 


Baso # (Auto)    0.01  (0.01-0.08)  K/mm3


 


Sodium     (136-145)  mEq/L


 


Potassium     (3.5-5.1)  mEq/L


 


Chloride     ()  mEq/L


 


Carbon Dioxide     (21-32)  mEq/L


 


Anion Gap     (5-15)  


 


BUN     (7-18)  mg/dL


 


Creatinine     (0.7-1.3)  mg/dL


 


Est Cr Clr Drug Dosing     mL/min


 


Estimated GFR (MDRD)     (>60)  mL/min


 


BUN/Creatinine Ratio     (14-18)  


 


Glucose     (70-99)  mg/dL


 


POC Glucose  116 H  116 H   (70-99)  mg/dL


 


Calcium     (8.5-10.1)  mg/dL


 


C-Reactive Protein     (<1.0)  mg/dL














  12/01/21 12/01/21 Range/Units





  07:20 11:06 


 


WBC    (4.23-9.07)  K/mm3


 


RBC    (4.63-6.08)  M/mm3


 


Hgb    (13.7-17.5)  gm/dl


 


Hct    (40.1-51.0)  %


 


MCV    (79.0-92.2)  fl


 


MCH    (25.7-32.2)  pg


 


MCHC    (32.2-35.5)  g/dl


 


RDW Std Deviation    (35.1-43.9)  fL


 


Plt Count    (163-337)  K/mm3


 


MPV    (9.4-12.3)  fl


 


Neut % (Auto)    (34.0-67.9)  %


 


Lymph % (Auto)    (21.8-53.1)  %


 


Mono % (Auto)    (5.3-12.2)  %


 


Eos % (Auto)    (0.8-7.0)  


 


Baso % (Auto)    (0.1-1.2)  %


 


Neut # (Auto)    (1.78-5.38)  K/mm3


 


Lymph # (Auto)    (1.32-3.57)  K/mm3


 


Mono # (Auto)    (0.30-0.82)  K/mm3


 


Eos # (Auto)    (0.04-0.54)  K/mm3


 


Baso # (Auto)    (0.01-0.08)  K/mm3


 


Sodium  145   (136-145)  mEq/L


 


Potassium  3.8   (3.5-5.1)  mEq/L


 


Chloride  108 H   ()  mEq/L


 


Carbon Dioxide  31   (21-32)  mEq/L


 


Anion Gap  9.8   (5-15)  


 


BUN  21 H   (7-18)  mg/dL


 


Creatinine  0.6 L   (0.7-1.3)  mg/dL


 


Est Cr Clr Drug Dosing  133.50   mL/min


 


Estimated GFR (MDRD)  > 60   (>60)  mL/min


 


BUN/Creatinine Ratio  35.0 H   (14-18)  


 


Glucose  132 H   (70-99)  mg/dL


 


POC Glucose   153 H  (70-99)  mg/dL


 


Calcium  8.5   (8.5-10.1)  mg/dL


 


C-Reactive Protein  5.5 H*   (<1.0)  mg/dL











Med Orders - Current: 


                               Current Medications





Albuterol/Ipratropium (Albuterol/Ipratropium 3.0-0.5 Mg/3 Ml Neb Soln)  3 ml NEB

Q4H PRN


   PRN Reason: Shortness Of Breath/wheezing


Enoxaparin Sodium (Enoxaparin 40 Mg/0.4 Ml Syringe)  40 mg SUBCUT DAILY Critical access hospital


   Last Admin: 12/01/21 09:34 Dose:  40 mg


   Documented by: 


Fluvoxamine Maleate (Fluvoxamine 50 Mg Tab)  150 mg PO BID [facility] hospital


   Last Admin: 12/01/21 09:34 Dose:  150 mg


   Documented by: 


Furosemide (Furosemide 20 Mg Tab)  20 mg PO DAILY [facility] hospital


   Last Admin: 12/01/21 09:36 Dose:  20 mg


   Documented by: 


Insulin Human Regular (Insulin Regular, Human 100 Units/Ml 3 Ml Vial)  0 unit 

SUBCUT TIDPC Critical access hospital; Protocol


   Last Admin: 12/01/21 09:36 Dose:  Not Given


   Documented by: 


Levofloxacin (Levofloxacin 500 Mg Tab)  500 mg PO Q24H [facility] hospital


Lorazepam (Lorazepam 0.5 Mg Tab)  0.5 mg PO QID [facility] hospital


   Last Admin: 12/01/21 09:34 Dose:  0.5 mg


   Documented by: 


Losartan Potassium (Losartan 100 Mg Tab)  100 mg PO DAILY [facility] hospital


   Last Admin: 12/01/21 09:36 Dose:  100 mg


   Documented by: 


Magnesium Hydroxide (Magnesium Hydroxide 400 Mg/5 Ml Susp 30 Ml Cup)  30 ml PO 

BEDTIME Critical access hospital


   Last Admin: 11/30/21 21:14 Dose:  30 ml


   Documented by: 


Melatonin (Melatonin 3 Mg Tab)  9 mg PO BEDTIME Critical access hospital


   Last Admin: 11/30/21 21:03 Dose:  9 mg


   Documented by: 


Mineral Oil/White Petrolatum (Mineral Oil/White Petrolatum Crm 113 Gm Jar)  0 gm

TOP BID Critical access hospital


   Last Admin: 12/01/21 09:36 Dose:  1 applic


   Documented by: 


Mirtazapine (Mirtazapine 15 Mg Tab)  7.5 mg PO BEDTIME Critical access hospital


   Last Admin: 11/30/21 21:04 Dose:  7.5 mg


   Documented by: 


Olanzapine (Olanzapine 5 Mg Tab)  10 mg PO DAILY Critical [facility] hospital


   Last Admin: 12/01/21 09:34 Dose:  10 mg


   Documented by: 


Ondansetron HCl (Ondansetron 4 Mg Tab.Dis)  4 mg PO Q4H PRN


   PRN Reason: nausea, able to take PO


Oxycodone HCl (Oxycodone 5 Mg Tab)  5 mg PO Q4H PRN


   PRN Reason: Pain (moderate 4-6)


Polyethylene Glycol (Polyethylene Glycol 3350 Powder 17 Gm Packet)  17 gm PO 

DAILY PRN


   PRN Reason: Constipation


Risperidone (Risperidone 1 Mg Tab)  3 mg PO BID Critical [facility] hospital


   Last Admin: 12/01/21 09:35 Dose:  3 mg


   Documented by: 


Tamsulosin HCl (Tamsulosin 0.4 Mg Cap.Er)  0.4 mg PO DAILY Critical [facility] hospital


   Last Admin: 12/01/21 09:36 Dose:  0.4 mg


   Documented by: 





Discontinued Medications





Albuterol/Ipratropium (Albuterol/Ipratropium 3.0-0.5 Mg/3 Ml Neb Soln)  3 ml INH

QID PRN


   PRN Reason: Cough


Bisacodyl (Bisacodyl 10 Mg Supp)  10 mg RECTAL ONETIME ONE


   Stop: 12/01/21 11:10


   Last Admin: 12/01/21 11:20 Dose:  10 mg


   Documented by: 


Lactated Ringer's (Ringers, Lactated)  1,000 mls @ 999 mls/hr IV .BOLUS ONE


   Stop: 11/28/21 02:59


   Last Admin: 11/28/21 02:05 Dose:  999 mls/hr


   Documented by: 


Lactated Ringer's (Ringers, Lactated)  1,000 mls @ 150 mls/hr IV ASDIRECTED Critical access hospital


   Last Admin: 11/29/21 16:28 Dose:  150 mls/hr


   Documented by: 


Sodium Chloride (Normal Saline)  100 mls @ 70 mls/min IV ASDIRECTED Critical access hospital


   Last Admin: 11/28/21 02:41 Dose:  70 mls/min


   Documented by: 


Levofloxacin/Dextrose 750 mg/ (Premix)  150 mls @ 100 mls/hr IV ONETIME ONE


   Stop: 11/28/21 04:08


   Last Admin: 11/28/21 03:02 Dose:  100 mls/hr


   Documented by: 


Sodium Chloride (Normal Saline)  100 mls @ 70 mls/min IV ASDIRECTED Critical access hospital


   Last Admin: 11/28/21 04:20 Dose:  70 mls/min


   Documented by: 


Lactated Ringer's (Ringers, Lactated)  1,000 mls @ 999 mls/hr IV .BOLUS ONE


   Stop: 11/28/21 06:35


   Last Admin: 11/28/21 05:41 Dose:  999 mls/hr


   Documented by: 


Levofloxacin/Dextrose 500 mg/ (Premix)  100 mls @ 100 mls/hr IV Q24H Critical access hospital


   Last Admin: 11/28/21 17:11 Dose:  Not Given


   Documented by: 


Levofloxacin/Dextrose 500 mg/ (Premix)  100 mls @ 100 mls/hr IV Q24H Critical [facility] hospital


   Last Admin: 12/01/21 03:05 Dose:  100 mls/hr


   Documented by: 


Iopamidol (Iopamidol 755 Mg/Ml 100 Ml Bottle)  100 ml IVPUSH ONETIME ONE


   Stop: 11/28/21 02:05


   Last Admin: 11/28/21 02:41 Dose:  100 ml


   Documented by: 


Iopamidol (Iopamidol 755 Mg/Ml 100 Ml Bottle)  100 ml IVPUSH ONETIME ONE


   Stop: 11/28/21 03:42


   Last Admin: 11/28/21 04:20 Dose:  100 ml


   Documented by: 


Lorazepam (Lorazepam 0.5 Mg Tab)  0.5 mg PO ONETIME ONE


   Stop: 11/28/21 03:30


   Last Admin: 11/28/21 03:36 Dose:  0.5 mg


   Documented by: 


Lorazepam (Lorazepam 0.5 Mg Tab)  0.5 mg PO ONETIME ONE


   Stop: 11/28/21 05:38


   Last Admin: 11/28/21 05:41 Dose:  0.5 mg


   Documented by: 


Morphine Sulfate (Morphine 2 Mg/Ml Syringe)  2 mg IVPUSH Q2H PRN


   PRN Reason: Pain (severe 7-10)


   Stop: 11/29/21 12:28


Non-Formulary Medication (Menthol/Selenium Sulfide [Selsun Blue 1% Shampoo])  

118 ml .XX MOTH LEXY


Sodium Chloride (Sodium Chloride 0.9% 10 Ml Sdv)  10 ml FLUSH ONETIME ONE


   Stop: 11/28/21 02:05


   Last Admin: 11/28/21 02:41 Dose:  10 ml


   Documented by: 


Sodium Chloride (Sodium Chloride 0.9% 10 Ml Sdv)  10 ml FLUSH ONETIME ONE


   Stop: 11/28/21 03:42


   Last Admin: 11/28/21 04:20 Dose:  10 ml


   Documented by: 











- Exam


Quality Assessment: Denies: Supplemental Oxygen


General: Reports: Alert.  Denies: Oriented


Neck: Reports: Supple


Lungs: Reports: Clear to Auscultation, Normal Respiratory Effort


Cardiovascular: Reports: Regular Rate, Regular Rhythm


GI/Abdominal Exam: Normal Bowel Sounds, Soft, Non-Tender, No Distention


Extremities: Normal Inspection, Normal Range of Motion, Non-Tender, No Pedal 

Edema, Normal Capillary Refill

## 2022-01-26 ENCOUNTER — HOSPITAL ENCOUNTER (EMERGENCY)
Dept: HOSPITAL 41 - JD.ED | Age: 62
Discharge: NURSING, REHAB OR LONG-TERM CARE | End: 2022-01-26
Payer: MEDICARE

## 2022-01-26 ENCOUNTER — HOSPITAL ENCOUNTER (OUTPATIENT)
Dept: HOSPITAL 41 - JD.ED | Age: 62
Discharge: HOME | End: 2022-01-26
Attending: SURGERY
Payer: MEDICARE

## 2022-01-26 VITALS — HEART RATE: 115 BPM | DIASTOLIC BLOOD PRESSURE: 80 MMHG | SYSTOLIC BLOOD PRESSURE: 146 MMHG

## 2022-01-26 VITALS — SYSTOLIC BLOOD PRESSURE: 119 MMHG | DIASTOLIC BLOOD PRESSURE: 55 MMHG

## 2022-01-26 VITALS — HEART RATE: 90 BPM

## 2022-01-26 DIAGNOSIS — E11.9: ICD-10-CM

## 2022-01-26 DIAGNOSIS — Z01.812: ICD-10-CM

## 2022-01-26 DIAGNOSIS — Z79.899: ICD-10-CM

## 2022-01-26 DIAGNOSIS — Z79.84: ICD-10-CM

## 2022-01-26 DIAGNOSIS — Z88.5: ICD-10-CM

## 2022-01-26 DIAGNOSIS — N40.0: ICD-10-CM

## 2022-01-26 DIAGNOSIS — Z88.8: ICD-10-CM

## 2022-01-26 DIAGNOSIS — Z86.16: ICD-10-CM

## 2022-01-26 DIAGNOSIS — Z88.0: ICD-10-CM

## 2022-01-26 DIAGNOSIS — I10: ICD-10-CM

## 2022-01-26 DIAGNOSIS — F29: ICD-10-CM

## 2022-01-26 DIAGNOSIS — Z20.822: ICD-10-CM

## 2022-01-26 DIAGNOSIS — T18.198A: Primary | ICD-10-CM

## 2022-01-26 DIAGNOSIS — Z02.89: Primary | ICD-10-CM

## 2022-01-26 DIAGNOSIS — E78.00: ICD-10-CM

## 2022-01-26 DIAGNOSIS — Z98.890: ICD-10-CM

## 2022-01-26 PROCEDURE — U0002 COVID-19 LAB TEST NON-CDC: HCPCS
